# Patient Record
Sex: FEMALE | Race: WHITE | Employment: OTHER | URBAN - METROPOLITAN AREA
[De-identification: names, ages, dates, MRNs, and addresses within clinical notes are randomized per-mention and may not be internally consistent; named-entity substitution may affect disease eponyms.]

---

## 2017-02-17 DIAGNOSIS — I10 HYPERTENSION GOAL BP (BLOOD PRESSURE) < 140/90: ICD-10-CM

## 2017-02-17 NOTE — TELEPHONE ENCOUNTER
Losartan 50 mg      Last Written Prescription Date: 8/12/2016  Last Fill Quantity: 90, # refills: 1  Last Office Visit with [facility] or [facility] prescribing provider: 6/8/2016       Potassium   Date Value Ref Range Status   12/13/2016 3.7 3.4 - 5.3 mmol/L Final     Creatinine   Date Value Ref Range Status   12/13/2016 0.90 0.52 - 1.04 mg/dL Final     BP Readings from Last 3 Encounters:   12/13/16 120/64   06/08/16 124/72   02/22/16 118/62

## 2017-02-20 RX ORDER — LOSARTAN POTASSIUM 50 MG/1
50 TABLET ORAL DAILY
Qty: 90 TABLET | Refills: 2 | Status: SHIPPED | OUTPATIENT
Start: 2017-02-20 | End: 2017-10-11

## 2017-02-20 NOTE — TELEPHONE ENCOUNTER
12/13/16 was last ov.  Prescription approved per [facility] Refill Protocol.  TA Jansen

## 2017-02-23 DIAGNOSIS — E11.9 TYPE 2 DIABETES MELLITUS WITHOUT COMPLICATION (H): Primary | ICD-10-CM

## 2017-02-23 NOTE — TELEPHONE ENCOUNTER
blood glucose monitoring (ACCU-CHEK PATTI PLUS) meter device kit      Last Written Prescription Date: 6/8/16  Last Fill Quantity: 1,  # refills: 0   Last Office Visit with [facility], [facility] or [facility] prescribing provider: 12/13/16

## 2017-02-24 RX ORDER — BLOOD-GLUCOSE METER
EACH MISCELLANEOUS
Qty: 1 KIT | Refills: 0 | Status: SHIPPED | OUTPATIENT
Start: 2017-02-24 | End: 2020-01-01

## 2017-03-11 DIAGNOSIS — I10 HYPERTENSION GOAL BP (BLOOD PRESSURE) < 140/90: ICD-10-CM

## 2017-03-13 RX ORDER — HYDROCHLOROTHIAZIDE 12.5 MG/1
CAPSULE ORAL
Qty: 90 CAPSULE | Refills: 2 | Status: SHIPPED | OUTPATIENT
Start: 2017-03-13 | End: 2017-10-11

## 2017-03-13 NOTE — TELEPHONE ENCOUNTER
hctz  Prescription approved per [facility] Refill Protocol.  TA Jansen    Last Written Prescription Date: 3/7/16  Last Fill Quantity: 90, # refills: 3  Last Office Visit with [facility], [facility] or [facility] prescribing provider: 12/31/16       Potassium   Date Value Ref Range Status   12/13/2016 3.7 3.4 - 5.3 mmol/L Final     Creatinine   Date Value Ref Range Status   12/13/2016 0.90 0.52 - 1.04 mg/dL Final     BP Readings from Last 3 Encounters:   12/13/16 120/64   06/08/16 124/72   02/22/16 118/62

## 2017-08-07 DIAGNOSIS — E78.5 HYPERLIPIDEMIA LDL GOAL <100: ICD-10-CM

## 2017-08-07 DIAGNOSIS — E11.9 TYPE 2 DIABETES MELLITUS WITHOUT COMPLICATION, WITHOUT LONG-TERM CURRENT USE OF INSULIN (H): ICD-10-CM

## 2017-08-07 NOTE — TELEPHONE ENCOUNTER
Medication Detail      Disp Refills Start End KIRAN   lovastatin (MEVACOR) 40 MG tablet 90 tablet 1 12/16/2016  No   Sig: Take 1 tablet (40 mg) by mouth At Bedtime       Last Office Visit with [facility], [facility] or  [facility] prescribing provider: 12/13/16       Lab Results   Component Value Date    CHOL 181 12/13/2016     Lab Results   Component Value Date    HDL 38 12/13/2016     Lab Results   Component Value Date     12/13/2016     Lab Results   Component Value Date    TRIG 192 12/13/2016     Lab Results   Component Value Date    CHOLHDLRATIO 5.4 05/27/2015     Medication Detail      Disp Refills Start End KIRAN   glipiZIDE (GLUCOTROL) 5 MG tablet 45 tablet 1 6/14/2016  No   Sig: Take 0.5 tablets (2.5 mg) by mouth every morning (before breakfast)       Last Office Visit with [facility], [facility] or [facility] prescribing provider:  12/13/16        BP Readings from Last 3 Encounters:   12/13/16 120/64   06/08/16 124/72   02/22/16 118/62     Lab Results   Component Value Date    MICROL 20 12/13/2016     Lab Results   Component Value Date    UMALCR 39.49 12/13/2016     Creatinine   Date Value Ref Range Status   12/13/2016 0.90 0.52 - 1.04 mg/dL Final   ]  GFR Estimate   Date Value Ref Range Status   12/13/2016 59 (L) >60 mL/min/1.7m2 Final     Comment:     Non  GFR Calc   06/08/2016 53 (L) >60 mL/min/1.7m2 Final     Comment:     Non  GFR Calc   01/08/2016 56 (L) >60 mL/min/1.7m2 Final     Comment:     Non  GFR Calc     GFR Estimate If Black   Date Value Ref Range Status   12/13/2016 71 >60 mL/min/1.7m2 Final     Comment:      GFR Calc   06/08/2016 64 >60 mL/min/1.7m2 Final     Comment:      GFR Calc   01/08/2016 68 >60 mL/min/1.7m2 Final     Comment:      GFR Calc     Lab Results   Component Value Date    CHOL 181 12/13/2016     Lab Results   Component Value Date    HDL 38 12/13/2016     Lab Results   Component Value Date     12/13/2016      Lab Results   Component Value Date    TRIG 192 12/13/2016     Lab Results   Component Value Date    CHOLHDLRATIO 5.4 05/27/2015     Lab Results   Component Value Date    AST 14 12/13/2016     Lab Results   Component Value Date    ALT 16 12/13/2016     Lab Results   Component Value Date    A1C 6.0 12/13/2016    A1C 5.9 06/08/2016    A1C 5.9 02/22/2016    A1C 6.3 10/02/2015    A1C 5.6 07/10/2015     Potassium   Date Value Ref Range Status   12/13/2016 3.7 3.4 - 5.3 mmol/L Final       gemfibrozil (LOPID) 600 MG tablet         Last Written Prescription Date:   Last Fill Quantity: , # refills:     Last Office Visit with [facility], [facility] or [facility] prescribing provider:  12/13/16   Future Office Visit:      Cholesterol   Date Value Ref Range Status   12/13/2016 181 <200 mg/dL Final     HDL Cholesterol   Date Value Ref Range Status   12/13/2016 38 (L) >49 mg/dL Final     LDL Cholesterol Calculated   Date Value Ref Range Status   12/13/2016 105 (H) <100 mg/dL Final     Comment:     Above desirable:  100-129 mg/dl   Borderline High:  130-159 mg/dL   High:             160-189 mg/dL   Very high:       >189 mg/dl       Triglycerides   Date Value Ref Range Status   12/13/2016 192 (H) <150 mg/dL Final     Comment:     Borderline high:  150-199 mg/dl   High:             200-499 mg/dl   Very high:       >499 mg/dl   Non Fasting       Cholesterol/HDL Ratio   Date Value Ref Range Status   05/27/2015 5.4 (H) 0.0 - 5.0 Final     ALT   Date Value Ref Range Status   12/13/2016 16 0 - 50 U/L Final

## 2017-08-10 NOTE — TELEPHONE ENCOUNTER
Routing refill request to provider for review/approval because:  Labs out of range:  GFR, LDL  -overdue for 6 month follow up visit    Dr. Tyson-Please sign if agree.  One month supplies pended.    Team coordinators-Please contact patient to schedule diabetes follow up visit.    Thank you!  JAMAR Mireles, DEVN, RN

## 2017-08-11 RX ORDER — GEMFIBROZIL 600 MG/1
600 TABLET, FILM COATED ORAL 2 TIMES DAILY
Qty: 60 TABLET | Refills: 0 | Status: SHIPPED | OUTPATIENT
Start: 2017-08-11 | End: 2017-10-11

## 2017-08-11 RX ORDER — LOVASTATIN 40 MG
40 TABLET ORAL AT BEDTIME
Qty: 30 TABLET | Refills: 0 | Status: SHIPPED | OUTPATIENT
Start: 2017-08-11 | End: 2017-10-11

## 2017-08-11 RX ORDER — GLIPIZIDE 5 MG/1
2.5 TABLET ORAL
Qty: 15 TABLET | Refills: 0 | Status: SHIPPED | OUTPATIENT
Start: 2017-08-11 | End: 2017-10-11

## 2017-10-11 ENCOUNTER — OFFICE VISIT (OUTPATIENT)
Dept: FAMILY MEDICINE | Facility: CLINIC | Age: 82
End: 2017-10-11
Payer: COMMERCIAL

## 2017-10-11 VITALS
WEIGHT: 158.5 LBS | TEMPERATURE: 98.1 F | DIASTOLIC BLOOD PRESSURE: 87 MMHG | OXYGEN SATURATION: 96 % | BODY MASS INDEX: 28.99 KG/M2 | SYSTOLIC BLOOD PRESSURE: 160 MMHG

## 2017-10-11 DIAGNOSIS — Z23 NEED FOR PROPHYLACTIC VACCINATION AGAINST STREPTOCOCCUS PNEUMONIAE (PNEUMOCOCCUS): ICD-10-CM

## 2017-10-11 DIAGNOSIS — I10 HYPERTENSION GOAL BP (BLOOD PRESSURE) < 140/90: ICD-10-CM

## 2017-10-11 DIAGNOSIS — E11.9 TYPE 2 DIABETES MELLITUS WITHOUT COMPLICATION, WITHOUT LONG-TERM CURRENT USE OF INSULIN (H): ICD-10-CM

## 2017-10-11 DIAGNOSIS — Z00.00 ROUTINE GENERAL MEDICAL EXAMINATION AT A HEALTH CARE FACILITY: Primary | ICD-10-CM

## 2017-10-11 DIAGNOSIS — Z23 NEED FOR PROPHYLACTIC VACCINATION AND INOCULATION AGAINST INFLUENZA: ICD-10-CM

## 2017-10-11 DIAGNOSIS — E78.5 HYPERLIPIDEMIA LDL GOAL <100: ICD-10-CM

## 2017-10-11 LAB — HBA1C MFR BLD: 5.7 % (ref 4.3–6)

## 2017-10-11 PROCEDURE — G0008 ADMIN INFLUENZA VIRUS VAC: HCPCS | Performed by: FAMILY MEDICINE

## 2017-10-11 PROCEDURE — 90670 PCV13 VACCINE IM: CPT | Performed by: FAMILY MEDICINE

## 2017-10-11 PROCEDURE — 83036 HEMOGLOBIN GLYCOSYLATED A1C: CPT | Performed by: FAMILY MEDICINE

## 2017-10-11 PROCEDURE — 99397 PER PM REEVAL EST PAT 65+ YR: CPT | Mod: 25 | Performed by: FAMILY MEDICINE

## 2017-10-11 PROCEDURE — G0009 ADMIN PNEUMOCOCCAL VACCINE: HCPCS | Performed by: FAMILY MEDICINE

## 2017-10-11 PROCEDURE — 80053 COMPREHEN METABOLIC PANEL: CPT | Performed by: FAMILY MEDICINE

## 2017-10-11 PROCEDURE — 90662 IIV NO PRSV INCREASED AG IM: CPT | Performed by: FAMILY MEDICINE

## 2017-10-11 PROCEDURE — 82043 UR ALBUMIN QUANTITATIVE: CPT | Performed by: FAMILY MEDICINE

## 2017-10-11 PROCEDURE — 84443 ASSAY THYROID STIM HORMONE: CPT | Performed by: FAMILY MEDICINE

## 2017-10-11 PROCEDURE — 99213 OFFICE O/P EST LOW 20 MIN: CPT | Mod: 25 | Performed by: FAMILY MEDICINE

## 2017-10-11 PROCEDURE — 36415 COLL VENOUS BLD VENIPUNCTURE: CPT | Performed by: FAMILY MEDICINE

## 2017-10-11 RX ORDER — GEMFIBROZIL 600 MG/1
600 TABLET, FILM COATED ORAL 2 TIMES DAILY
Qty: 60 TABLET | Refills: 0 | Status: CANCELLED | OUTPATIENT
Start: 2017-10-11

## 2017-10-11 RX ORDER — GLIPIZIDE 5 MG/1
2.5 TABLET ORAL
Qty: 45 TABLET | Refills: 3 | Status: SHIPPED | OUTPATIENT
Start: 2017-10-11 | End: 2017-10-12

## 2017-10-11 RX ORDER — LOSARTAN POTASSIUM 50 MG/1
50 TABLET ORAL DAILY
Qty: 90 TABLET | Refills: 3 | Status: SHIPPED | OUTPATIENT
Start: 2017-10-11 | End: 2018-10-01

## 2017-10-11 RX ORDER — LOVASTATIN 40 MG
40 TABLET ORAL AT BEDTIME
Qty: 90 TABLET | Refills: 3 | Status: SHIPPED | OUTPATIENT
Start: 2017-10-11 | End: 2018-11-06

## 2017-10-11 RX ORDER — HYDROCHLOROTHIAZIDE 12.5 MG/1
CAPSULE ORAL
Qty: 90 CAPSULE | Refills: 3 | Status: SHIPPED | OUTPATIENT
Start: 2017-10-11 | End: 2019-01-02

## 2017-10-11 NOTE — NURSING NOTE
Screening Questionnaire for Adult Immunization    Are you sick today?   No   Do you have allergies to medications, food, a vaccine component or latex?   No   Have you ever had a serious reaction after receiving a vaccination?   No   Do you have a long-term health problem with heart disease, lung disease, asthma, kidney disease, metabolic disease (e.g. diabetes), anemia, or other blood disorder?   No   Do you have cancer, leukemia, HIV/AIDS, or any other immune system problem?   No   In the past 3 months, have you taken medications that affect  your immune system, such as prednisone, other steroids, or anticancer drugs; drugs for the treatment of rheumatoid arthritis, Crohn s disease, or psoriasis; or have you had radiation treatments?   No   Have you had a seizure, or a brain or other nervous system problem?   No   During the past year, have you received a transfusion of blood or blood     products, or been given immune (gamma) globulin or antiviral drug?   No   For women: Are you pregnant or is there a chance you could become        pregnant during the next month?   No   Have you received any vaccinations in the past 4 weeks?   No     Immunization questionnaire answers were all negative.        Per orders of Dr. chauhan, injection of high dose flu and pcv13 given by Power Marvin. Patient instructed to remain in clinic for 15 minutes afterwards, and to report any adverse reaction to me immediately.  Per orders of Dr. chauhan, injection of high dose flu and pcv13  given by Power Marvin. Patient instructed to remain in clinic for 15 minutes afterwards, and to report any adverse reaction to me immediately.  Prior to injection verified patient identity using patient's name and date of birth.       Screening performed by Power Marvin on 10/11/2017 at 3:44 PM.

## 2017-10-11 NOTE — NURSING NOTE
"Chief Complaint   Patient presents with     Physical     pt is not fasting, had toast        Initial /87  Temp 98.1  F (36.7  C)  Wt 158 lb 8 oz (71.9 kg)  SpO2 96%  BMI 28.99 kg/m2 Estimated body mass index is 28.99 kg/(m^2) as calculated from the following:    Height as of 12/13/16: 5' 2\" (1.575 m).    Weight as of this encounter: 158 lb 8 oz (71.9 kg).  Medication Reconciliation: complete     Kandace Murphy, LEVON        "

## 2017-10-11 NOTE — MR AVS SNAPSHOT
After Visit Summary   10/11/2017    Reanna Garza    MRN: [id number]           Patient Information     Date Of Birth          12/25/1927        Visit Information        Provider Department      10/11/2017 1:40 PM Tristian Tyson MD [facility]        Today's Diagnoses     Type 2 diabetes mellitus without complication, without long-term current use of insulin (H)    -  1    Routine general medical examination at a health care facility        Hyperlipidemia LDL goal <100        Hypertension goal BP (blood pressure) < 140/90        Need for prophylactic vaccination and inoculation against influenza        Need for prophylactic vaccination against Streptococcus pneumoniae (pneumococcus)          Care Instructions    - check bp at home - goal is less than 140/90 - both numbers needs to be below goal.  If above goal - call clinic and we will adjust your medications and see you follow up.     Stop gemfibrozil.     Preventive Health Recommendations    Female Ages 65 +    Yearly exam:     See your health care provider every year in order to  o Review health changes.   o Discuss preventive care.    o Review your medicines if your doctor has prescribed any.      You no longer need a yearly Pap test unless you've had an abnormal Pap test in the past 10 years. If you have vaginal symptoms, such as bleeding or discharge, be sure to talk with your provider about a Pap test.      Every 1 to 2 years, have a mammogram.  If you are over 69, talk with your health care provider about whether or not you want to continue having screening mammograms.      Every 10 years, have a colonoscopy. Or, have a yearly FIT test (stool test). These exams will check for colon cancer.       Have a cholesterol test every 5 years, or more often if your doctor advises it.       Have a diabetes test (fasting glucose) every three years. If you are at risk for diabetes, you should have this test more often.       At  age 65, have a bone density scan (DEXA) to check for osteoporosis (brittle bone disease).    Shots:    Get a flu shot each year.    Get a tetanus shot every 10 years.    Talk to your doctor about your pneumonia vaccines. There are now two you should receive - Pneumovax (PPSV 23) and Prevnar (PCV 13).    Talk to your doctor about the shingles vaccine.    Talk to your doctor about the hepatitis B vaccine.    Nutrition:     Eat at least 5 servings of fruits and vegetables each day.      Eat whole-grain bread, whole-wheat pasta and brown rice instead of white grains and rice.      Talk to your provider about Calcium and Vitamin D.     Lifestyle    Exercise at least 150 minutes a week (30 minutes a day, 5 days a week). This will help you control your weight and prevent disease.      Limit alcohol to one drink per day.      No smoking.       Wear sunscreen to prevent skin cancer.       See your dentist twice a year for an exam and cleaning.      See your eye doctor every 1 to 2 years to screen for conditions such as glaucoma, macular degeneration and cataracts.          Follow-ups after your visit        Who to contact     If you have questions or need follow up information about today's clinic visit or your schedule please contact [facility] directly at [phone].  Normal or non-critical lab and imaging results will be communicated to you by sCoolTVhart, letter or phone within 4 business days after the clinic has received the results. If you do not hear from us within 7 days, please contact the clinic through [employer] or phone. If you have a critical or abnormal lab result, we will notify you by phone as soon as possible.  Submit refill requests through [employer] or call your pharmacy and they will forward the refill request to us. Please allow 3 business days for your refill to be completed.          Additional Information About Your Visit        [employer] Information     [employer] lets you send messages to your  "doctor, view your test results, renew your prescriptions, schedule appointments and more. To sign up, go to [email or web address] . Click on \"Log in\" on the left side of the screen, which will take you to the Welcome page. Then click on \"Sign up Now\" on the right side of the page.     You will be asked to enter the access code listed below, as well as some personal information. Please follow the directions to create your username and password.     Your access code is: [email or web address]  Expires: 2017  3:11 PM     Your access code will  in 90 days. If you need help or a new code, please call your [place] clinic or [phone].        Care EveryWhere ID     This is your Care EveryWhere ID. This could be used by other organizations to access your [facility] medical records  [id number]        Your Vitals Were     Temperature Pulse Oximetry BMI (Body Mass Index)             98.1  F (36.7  C) 96% 28.99 kg/m2          Blood Pressure from Last 3 Encounters:   10/11/17 160/87   16 120/64   16 124/72    Weight from Last 3 Encounters:   10/11/17 158 lb 8 oz (71.9 kg)   16 155 lb 12 oz (70.6 kg)   16 155 lb 8 oz (70.5 kg)              We Performed the Following     Albumin Random Urine Quantitative with Creat Ratio     Comprehensive metabolic panel     FLU VACCINE, INCREASED ANTIGEN, PRESV FREE, AGE 65+ [13506]     Hemoglobin A1c     Pneumococcal vaccine 13 valent PCV13 IM (Prevnar) [25670]     TSH with free T4 reflex     Vaccine Administration, Initial [62544]          Today's Medication Changes          These changes are accurate as of: 10/11/17  2:20 PM.  If you have any questions, ask your nurse or doctor.               These medicines have changed or have updated prescriptions.        Dose/Directions    hydrochlorothiazide 12.5 MG capsule   Commonly known as:  MICROZIDE   This may have changed:  See the new instructions.   Used for:  Hypertension goal BP (blood pressure) < 140/90 "   Changed by:  Tristian Tyson MD        TAKE 1 CAPSULE(12.5 MG) BY MOUTH DAILY   Quantity:  90 capsule   Refills:  3         Stop taking these medicines if you haven't already. Please contact your care team if you have questions.     gemfibrozil 600 MG tablet   Commonly known as:  LOPID   Stopped by:  Tristian Tyson MD                Where to get your medicines      [facility] [id number] [place] [address] AT [facility] [address] [facility] [address]     Phone:  [phone]     glipiZIDE 5 MG tablet    hydrochlorothiazide 12.5 MG capsule    losartan 50 MG tablet    lovastatin 40 MG tablet                Primary Care Provider Office Phone # Fax #    Tristian Tyson [phone] [address] [place] [address]        Equal Access to Services     [facility]: Hadii valencia ku hadasho Soomaali, waaxda luqadaha, qaybta kaalmada adeegyada, waxay idiin haybibin arpita wren . So [facility] [phone].    ATENCIÓN: Si juan cedillo, tiene a mejia disposición servicios gratuitos de asistencia lingüística. Llame al [phone].    We comply with applicable federal civil rights laws and [place] laws. We do not discriminate on the basis of race, color, national origin, age, disability, sex, sexual orientation, or gender identity.            Thank you!     Thank you for choosing [facility]  for your care. Our goal is always to provide you with excellent care. Hearing back from our patients is one way we can continue to improve our services. Please take a few minutes to complete the written survey that you may receive in the mail after your visit with us. Thank you!             Your Updated Medication List - Protect others around you: Learn how to safely use, store and throw away your medicines at www.disposemymeds.org.          This list is accurate as of: 10/11/17   2:20 PM.  Always use your most recent med list.                   Brand Name Dispense Instructions for use Diagnosis    * ACCU-CHEK PATTI Aliya      1 Device daily    Type 2 diabetes, HbA1C goal < 8% (H)       * blood glucose monitoring meter device kit     1 kit    Use to test blood sugars 4 times daily or as directed.    Type 2 diabetes mellitus without complication (H)       acetaminophen 650 MG 8 hour tablet     100 tablet    Take 650 mg by mouth every 4 hours as needed for mild pain or fever    Stroke (H)       ADVIL PO           aspirin 162 MG EC tablet      Take 1 tablet (162 mg) by mouth daily    Stroke (H)       blood glucose monitoring lancets     2 Box    Use to test blood sugar 2 times daily or as directed. Compatible with glucometer.    Type 2 diabetes, HbA1C goal < 8% (H)       * blood glucose monitoring test strip    no brand specified    1 Box    Use to test blood sugar 2 times daily or as directed.    Type 2 diabetes, HbA1C goal < 8% (H)       * blood glucose monitoring test strip    ACCU-CHEK PATTI PLUS    1 Box    Use to test blood sugar 4 times daily or as directed.    Type 2 diabetes, HbA1C goal < 8% (H)       * ACCU-CHEK PATTI PLUS test strip   Generic drug:  blood glucose monitoring           calcium citrate-vitamin D 315-200 MG-UNIT Tabs per tablet    CALCIUM + D    60 tablet    Take 2 tablets by mouth daily    Routine general medical examination at a health care facility       glipiZIDE 5 MG tablet    GLUCOTROL    45 tablet    Take 0.5 tablets (2.5 mg) by mouth every morning (before breakfast)    Type 2 diabetes mellitus without complication, without long-term current use of insulin (H)       hydrochlorothiazide 12.5 MG capsule    MICROZIDE    90 capsule    TAKE 1 CAPSULE(12.5 MG) BY MOUTH DAILY    Hypertension goal BP (blood pressure) < 140/90       hypromellose-dextran 0.3-0.1% opthalmic solution      Apply to eye every 6 hours as needed        losartan 50 MG tablet    COZAAR    90 tablet     Take 1 tablet (50 mg) by mouth daily    Hypertension goal BP (blood pressure) < 140/90       lovastatin 40 MG tablet    MEVACOR    90 tablet    Take 1 tablet (40 mg) by mouth At Bedtime    Hyperlipidemia LDL goal <100       vitamin D 2000 UNITS tablet     100 tablet    Take 4,000 Units by mouth daily    Vitamin D deficiency       * Notice:  This list has 5 medication(s) that are the same as other medications prescribed for you. Read the directions carefully, and ask your doctor or other care provider to review them with you.

## 2017-10-11 NOTE — LETTER
October 16, 2017      Reanna Garza  [address] [place] [id number]        Dear Reanna,    Your A1c is 5.7 which is good. It is going towards lower side and we may consider stopping your diabetes medications if your blood sugar continues to run low.  RIGHT NOW I SUGGEST YOU TAKE YOUR GLIPIZIDE 1/2 PILL EVERY OTHER DAY INSTEAD OF DAILY.   Your urine shows some leaking of protein which is common with diabetes.  Your thyroid is normal.   Kidney and liver functions are fine.  As your blood sugars are running on lower side, we want to see you back in 3 months.  Call me with questions.     Results for orders placed or performed in visit on 10/11/17   Albumin Random Urine Quantitative with Creat Ratio   Result Value Ref Range    Creatinine Urine 54 mg/dL    Albumin Urine mg/L 24 mg/L    Albumin Urine mg/g Cr 44.80 (H) 0 - 25 mg/g Cr   TSH with free T4 reflex   Result Value Ref Range    TSH 2.29 0.40 - 4.00 mU/L   Hemoglobin A1c   Result Value Ref Range    Hemoglobin A1C 5.7 4.3 - 6.0 %   Comprehensive metabolic panel   Result Value Ref Range    Sodium 140 133 - 144 mmol/L    Potassium 3.5 3.4 - 5.3 mmol/L    Chloride 105 94 - 109 mmol/L    Carbon Dioxide 27 20 - 32 mmol/L    Anion Gap 8 3 - 14 mmol/L    Glucose 66 (L) 70 - 99 mg/dL    Urea Nitrogen 25 7 - 30 mg/dL    Creatinine 0.79 0.52 - 1.04 mg/dL    GFR Estimate 69 >60 mL/min/1.7m2    GFR Estimate If Black 83 >60 mL/min/1.7m2    Calcium 9.1 8.5 - 10.1 mg/dL    Bilirubin Total 0.4 0.2 - 1.3 mg/dL    Albumin 4.2 3.4 - 5.0 g/dL    Protein Total 7.7 6.8 - 8.8 g/dL    Alkaline Phosphatase 49 40 - 150 U/L    ALT 18 0 - 50 U/L    AST 23 0 - 45 U/L               Sincerely,        Tristian Tyson MD/bee

## 2017-10-11 NOTE — PATIENT INSTRUCTIONS
- check bp at home - goal is less than 140/90 - both numbers needs to be below goal.  If above goal - call clinic and we will adjust your medications and see you follow up.     Stop gemfibrozil.     Preventive Health Recommendations    Female Ages 65 +    Yearly exam:     See your health care provider every year in order to  o Review health changes.   o Discuss preventive care.    o Review your medicines if your doctor has prescribed any.      You no longer need a yearly Pap test unless you've had an abnormal Pap test in the past 10 years. If you have vaginal symptoms, such as bleeding or discharge, be sure to talk with your provider about a Pap test.      Every 1 to 2 years, have a mammogram.  If you are over 69, talk with your health care provider about whether or not you want to continue having screening mammograms.      Every 10 years, have a colonoscopy. Or, have a yearly FIT test (stool test). These exams will check for colon cancer.       Have a cholesterol test every 5 years, or more often if your doctor advises it.       Have a diabetes test (fasting glucose) every three years. If you are at risk for diabetes, you should have this test more often.       At age 65, have a bone density scan (DEXA) to check for osteoporosis (brittle bone disease).    Shots:    Get a flu shot each year.    Get a tetanus shot every 10 years.    Talk to your doctor about your pneumonia vaccines. There are now two you should receive - Pneumovax (PPSV 23) and Prevnar (PCV 13).    Talk to your doctor about the shingles vaccine.    Talk to your doctor about the hepatitis B vaccine.    Nutrition:     Eat at least 5 servings of fruits and vegetables each day.      Eat whole-grain bread, whole-wheat pasta and brown rice instead of white grains and rice.      Talk to your provider about Calcium and Vitamin D.     Lifestyle    Exercise at least 150 minutes a week (30 minutes a day, 5 days a week). This will help you control your weight  and prevent disease.      Limit alcohol to one drink per day.      No smoking.       Wear sunscreen to prevent skin cancer.       See your dentist twice a year for an exam and cleaning.      See your eye doctor every 1 to 2 years to screen for conditions such as glaucoma, macular degeneration and cataracts.

## 2017-10-11 NOTE — PROGRESS NOTES
".        SUBJECTIVE:   Reanna Garza is a 89 year old female who presents for Preventive Visit.      Are you in the first 12 months of your Medicare Part B coverage?  No    Healthy Habits:    Do you get at least three servings of calcium containing foods daily (dairy, green leafy vegetables, etc.)? {YES/NO, DAIRY INTAKE:004315::\"yes\"}    Amount of exercise or daily activities, outside of work: {AMOUNT EXERCISE:765718}    Problems taking medications regularly {Yes /No default:816768::\"No\"}    Medication side effects: {Yes /No default.:673348::\"No\"}    Have you had an eye exam in the past two years? {YESNOBLANK:303353}    Do you see a dentist twice per year? {YESNOBLANK:708344}    Do you have sleep apnea, excessive snoring or daytime drowsiness?{YESNOBLANK:698312}    {AWV Cognitive Screenin}    {Outside tests to abstract? :428774}    {additional problems to add (Optional):034825}    Reviewed and updated as needed this visit by clinical staff         Reviewed and updated as needed this visit by Provider        Social History   Substance Use Topics     Smoking status: Former Smoker     Quit date: 1999     Smokeless tobacco: Never Used     Alcohol use Yes      Comment: rare       {ETOH AUDIT:894198}    Today's PHQ-2 Score:   PHQ-2 (  [employer]) 10/11/2017 2016   Q1: Little interest or pleasure in doing things 0 0   Q2: Feeling down, depressed or hopeless 0 0   PHQ-2 Score 0 0   Q1: Little interest or pleasure in doing things Not at all -   Q2: Feeling down, depressed or hopeless Not at all -   PHQ-2 Score 0 -     {PHQ-2 LOOK IN ASSESSMENTS (Optional) :136187}  Do you feel safe in your environment - {YES/NO/NA:238559}    Do you have a Health Care Directive?: {HEALTHCARE DIRECTIVE STATUS:106546}    Current providers sharing in care for this patient include:   Patient Care Team:  Tristian Tyson MD as PCP - General (Family Practice)      Hearing impairment: {NO/YES:147652}    Ability to " "successfully perform activities of daily living: {YES/NO (MEDICARE):146244::\"Yes, no assistance needed\"}     Fall risk:  {Document Fall Risk in the Assessments Section of the Navigator:714519}    Home safety:  {IPPE SAFETY CONCERNS:101007::\"none identified\"}  {If any of the above assessments are answered yes, consider ordering appropriate referrals (Optional):647033::\"click delete button to remove this line now\"}    The following health maintenance items are reviewed in Epic and correct as of today:  Health Maintenance   Topic Date Due     PNEUMOCOCCAL (2 of 2 - PCV13) 2009     FOOT EXAM Q1 YEAR  2016     EYE EXAM Q1 YEAR  2016     TSH W/ FREE T4 REFLEX Q2 YEAR  2017     CBC Q1 YR  2017     PHQ-9 Q1YR  2017     CMP Q6 MOS  2017     A1C Q6 MO  2017     LIPID MONITORING Q6 MO  2017     INFLUENZA VACCINE (SYSTEM ASSIGNED)  2017     FALL RISK ASSESSMENT  2017     MICROALBUMIN Q1 YEAR  2017     MELODIE QUESTIONNAIRE 1 YEAR  2017     ADVANCE DIRECTIVE PLANNING Q5 YRS  2020     TETANUS IMMUNIZATION (SYSTEM ASSIGNED)  07/10/2024     {Chronicprobdata (Optional):660880}    {Decision Support (Optional):354694}    ROS:  {ROS COMP:075200}    OBJECTIVE:   There were no vitals taken for this visit. Estimated body mass index is 28.49 kg/(m^2) as calculated from the following:    Height as of 16: 5' 2\" (1.575 m).    Weight as of 16: 155 lb 12 oz (70.6 kg).  EXAM:   {Exam :994471}    ASSESSMENT / PLAN:   {Diag Picklist:002301}    End of Life Planning:  Patient currently has an advanced directive: { :212759}    COUNSELING:  {Medicare Counselin}    {BP Counseling- Complete if BP >= 120/80  (Optional):716382}    Estimated body mass index is 28.49 kg/(m^2) as calculated from the following:    Height as of 16: 5' 2\" (1.575 m).    Weight as of 16: 155 lb 12 oz (70.6 kg).  {Weight Management Plan -- Complete if patient has an " abnormal BMI (Optional):284882}   reports that she quit smoking about 17 years ago. She has never used smokeless tobacco.  {Tobacco Cessation -- Complete if patient is a smoker (Optional):278440}    Appropriate preventive services were discussed with this patient, including applicable screening as appropriate for cardiovascular disease, diabetes, osteopenia/osteoporosis, and glaucoma.  As appropriate for age/gender, discussed screening for colorectal cancer, prostate cancer, breast cancer, and cervical cancer. Checklist reviewing preventive services available has been given to the patient.    Reviewed patients plan of care and provided an AVS. The {CarePlan:983218} for Reanna meets the Care Plan requirement. This Care Plan has been established and reviewed with the {PATIENT, FAMILY MEMBER, CAREGIVER:744757}.    Counseling Resources:  ATP IV Guidelines  Pooled Cohorts Equation Calculator  Breast Cancer Risk Calculator  FRAX Risk Assessment  ICSI Preventive Guidelines  Dietary Guidelines for Americans, 2010  USDA's MyPlate  ASA Prophylaxis  Lung CA Screening    Tristian Tyson MD, MD  [facility]

## 2017-10-12 LAB
ALBUMIN SERPL-MCNC: 4.2 G/DL (ref 3.4–5)
ALP SERPL-CCNC: 49 U/L (ref 40–150)
ALT SERPL W P-5'-P-CCNC: 18 U/L (ref 0–50)
ANION GAP SERPL CALCULATED.3IONS-SCNC: 8 MMOL/L (ref 3–14)
AST SERPL W P-5'-P-CCNC: 23 U/L (ref 0–45)
BILIRUB SERPL-MCNC: 0.4 MG/DL (ref 0.2–1.3)
BUN SERPL-MCNC: 25 MG/DL (ref 7–30)
CALCIUM SERPL-MCNC: 9.1 MG/DL (ref 8.5–10.1)
CHLORIDE SERPL-SCNC: 105 MMOL/L (ref 94–109)
CO2 SERPL-SCNC: 27 MMOL/L (ref 20–32)
CREAT SERPL-MCNC: 0.79 MG/DL (ref 0.52–1.04)
CREAT UR-MCNC: 54 MG/DL
GFR SERPL CREATININE-BSD FRML MDRD: 69 ML/MIN/1.7M2
GLUCOSE SERPL-MCNC: 66 MG/DL (ref 70–99)
MICROALBUMIN UR-MCNC: 24 MG/L
MICROALBUMIN/CREAT UR: 44.8 MG/G CR (ref 0–25)
POTASSIUM SERPL-SCNC: 3.5 MMOL/L (ref 3.4–5.3)
PROT SERPL-MCNC: 7.7 G/DL (ref 6.8–8.8)
SODIUM SERPL-SCNC: 140 MMOL/L (ref 133–144)
TSH SERPL DL<=0.005 MIU/L-ACNC: 2.29 MU/L (ref 0.4–4)

## 2017-10-12 RX ORDER — GLIPIZIDE 5 MG/1
TABLET ORAL
Qty: 23 TABLET | Refills: 3 | Status: SHIPPED | OUTPATIENT
Start: 2017-10-12 | End: 2018-01-09

## 2017-11-17 DIAGNOSIS — E78.5 HYPERLIPIDEMIA LDL GOAL <100: ICD-10-CM

## 2017-11-17 NOTE — TELEPHONE ENCOUNTER
lov 10/11/17    Medication Detail      Disp Refills Start End KIRAN   gemfibrozil (LOPID) 600 MG tablet (Discontinued) 60 tablet 0 8/11/2017 10/11/2017 Citlalli neville [facility]

## 2017-11-21 NOTE — TELEPHONE ENCOUNTER
Per 10/11/17 office visit note, Gemfibrozil discontinued and patient started on Lovastatin.    -Please contact patient to ask if she requested Gemfibrozil refill.  Is she still taking Gemfibrozil or did she switch over to Lovastatin?    Thank you!  DEV VilledaN, RN

## 2017-12-05 RX ORDER — GEMFIBROZIL 600 MG/1
TABLET, FILM COATED ORAL
Qty: 180 TABLET | Refills: 0 | OUTPATIENT
Start: 2017-12-05

## 2017-12-05 NOTE — TELEPHONE ENCOUNTER
Reviewed the last ov note with pt.  She wrote down the name-lovastatin. She will only take that med.  She will call her pharmacy about any needed refill on that.    PT felt her daughter knew about this med change.  TA Jansen

## 2017-12-05 NOTE — TELEPHONE ENCOUNTER
The writer spoke to the patient for medication clarification as noted below.  The patient seems to be a bit confused as to what she is to be taking.  RN, can you reach out to the patient to clarify and ensure that the patient is taking medications as she should.

## 2018-01-03 DIAGNOSIS — I10 HYPERTENSION GOAL BP (BLOOD PRESSURE) < 140/90: ICD-10-CM

## 2018-01-03 NOTE — TELEPHONE ENCOUNTER
Requested Prescriptions   Pending Prescriptions Disp Refills     hydrochlorothiazide (MICROZIDE) 12.5 MG capsule [Pharmacy Med Name: HYDROCHLOROTHIAZIDE 12.5MG CAPSULES]  Last Written Prescription Date:  10/11/2017  Last Fill Quantity: 90 capsule,  # refills: 3   Last Office Visit with [facility], [facility] or [facility] prescribing provider:  10/11/2017   Future Office Visit:    90 capsule 0     Sig: TAKE 1 CAPSULE(12.5 MG) BY MOUTH DAILY    Diuretics (Including Combos) Protocol Failed    1/3/2018  3:57 AM       Failed - Blood pressure under 140/90    BP Readings from Last 3 Encounters:   10/11/17 160/87   12/13/16 120/64   06/08/16 124/72          Passed - Recent or future visit with authorizing provider's specialty    Patient had office visit in the last year or has a visit in the next 30 days with authorizing provider.  See chart review.          Passed - Patient is age 18 or older       Passed - No active pregancy on record       Passed - Normal serum creatinine on file in past 12 months    Recent Labs   Lab Test  10/11/17   1410   CR  0.79           Passed - Normal serum potassium on file in past 12 months    Recent Labs   Lab Test  10/11/17   1410   POTASSIUM  3.5           Passed - Normal serum sodium on file in past 12 months    Recent Labs   Lab Test  10/11/17   1410   NA  140           Passed - No positive pregnancy test in past 12 months

## 2018-01-05 RX ORDER — HYDROCHLOROTHIAZIDE 12.5 MG/1
CAPSULE ORAL
Qty: 90 CAPSULE | Refills: 0 | OUTPATIENT
Start: 2018-01-05

## 2018-01-08 ENCOUNTER — TELEPHONE (OUTPATIENT)
Dept: FAMILY MEDICINE | Facility: CLINIC | Age: 83
End: 2018-01-08

## 2018-01-08 DIAGNOSIS — E11.9 TYPE 2 DIABETES, HBA1C GOAL < 8% (H): ICD-10-CM

## 2018-01-08 NOTE — TELEPHONE ENCOUNTER
"Reason for Call:  Other-Concern Regarding Pt Behavior    Detailed comments: Pt's daughter called regarding mother's Glipizide medication dose. Pt receive a letter on 10/11/2017 stating \"RIGHT NOW I SUGGEST YOU TAKE YOUR GLIPIZIDE 1/2 PILL EVERY OTHER DAY INSTEAD OF DAILY.\" Pt;s daughter states that her mother has been very forgetful, angry, and speech is slurring. Please contact Marcella regarding this. Thanks!    Phone Number Patient can be reached at: [phone]    Best Time: Anytime    Can we leave a detailed message on this number? YES    Call taken on 1/8/2018 at 10:15 AM by Amee Blackwell      "

## 2018-01-08 NOTE — TELEPHONE ENCOUNTER
Marcella calling back said she check her mother's blood sugars and she said's they are fine and does not think taking her to the ER is neccessary patient has appt this Friday but she is asking to get a prescription filled for test strips and was told by pharmacy that she would have to ask for a new prescription for this

## 2018-01-08 NOTE — TELEPHONE ENCOUNTER
Writer called Marcella who stated:  1. Patient's blood sugar was 120  2. Currently with patient and she seems fine  3. Follow up appt with Dr. Tyson on 1/12/17.  4. Can Accu-Chek Zahraa Plus test strips be refilled?   A. Would like to  today from patient's pharmacy    Writer recommended:  1. Keep follow up appt  2. If patient has any symptoms that seem unusual for her or of are any concern, call clinic  3. Writer will be able to send in refill for test strips but writer cannot say when pharmacy will have them ready for     Marcella verbalized understanding and in agreement with plan.    JAMAR Carlson, DEVN, RN

## 2018-01-08 NOTE — TELEPHONE ENCOUNTER
"Writer notes stroke on patient's problem list.    There is no consent to communicate with patient's daughter on file.    Writer is concerned about possible stroke symptoms.    Writer called Marcella who stated:  1. She (Marcella) knows when patient's blood sugars are high and patient not taking her medication  2. Does not think patient is getting enough of all her meds  3. Told one sister to tell the other sister, Edith, who sets up meds to make sure patient takes Glipizide DAILY, instead of every other day, which is what letter from Dr. Tyson instructed  4. Dr. Tyson says patient does not need to check blood sugars and can \"eat cake\"    Writer explained to Marcella why an accurate blood sugar reading today is important: if there is concern for high blood sugar, need to evaluate for possible diabetic ketoacidosis (DKA).  Explained serious complications of DKA.    Marcella insisted DKA is not a result of high blood sugar and writer again explained DKA.      Reviewed if patient having slurred speech and it is new, she needs to be evaluated in ER.    Marcella stated she would call back with blood sugar reading-is taking patient to an eye doctor appt today.  Patient has two meters at home and one of them should work.    Marcella then stated the slurred speech is \"minute\" and is more of a hesitation in patient's speech while speaking that is noticeable by her, only.    Awaiting call back from Marcella.    JAMAR Carlson, BSN, RN        "

## 2018-01-09 ENCOUNTER — OFFICE VISIT (OUTPATIENT)
Dept: FAMILY MEDICINE | Facility: CLINIC | Age: 83
End: 2018-01-09
Payer: COMMERCIAL

## 2018-01-09 VITALS
HEIGHT: 61 IN | TEMPERATURE: 98.1 F | BODY MASS INDEX: 29.92 KG/M2 | OXYGEN SATURATION: 97 % | RESPIRATION RATE: 18 BRPM | WEIGHT: 158.5 LBS | SYSTOLIC BLOOD PRESSURE: 130 MMHG | DIASTOLIC BLOOD PRESSURE: 73 MMHG | HEART RATE: 68 BPM

## 2018-01-09 DIAGNOSIS — E11.9 TYPE 2 DIABETES MELLITUS WITHOUT COMPLICATION, WITHOUT LONG-TERM CURRENT USE OF INSULIN (H): Primary | ICD-10-CM

## 2018-01-09 LAB
ERYTHROCYTE [DISTWIDTH] IN BLOOD BY AUTOMATED COUNT: 13.7 % (ref 10–15)
HBA1C MFR BLD: 6.3 % (ref 4.3–6)
HCT VFR BLD AUTO: 37.2 % (ref 35–47)
HGB BLD-MCNC: 12.5 G/DL (ref 11.7–15.7)
MCH RBC QN AUTO: 29.1 PG (ref 26.5–33)
MCHC RBC AUTO-ENTMCNC: 33.6 G/DL (ref 31.5–36.5)
MCV RBC AUTO: 87 FL (ref 78–100)
PLATELET # BLD AUTO: 203 10E9/L (ref 150–450)
RBC # BLD AUTO: 4.3 10E12/L (ref 3.8–5.2)
WBC # BLD AUTO: 6.9 10E9/L (ref 4–11)

## 2018-01-09 PROCEDURE — 99214 OFFICE O/P EST MOD 30 MIN: CPT | Performed by: FAMILY MEDICINE

## 2018-01-09 PROCEDURE — 80053 COMPREHEN METABOLIC PANEL: CPT | Performed by: FAMILY MEDICINE

## 2018-01-09 PROCEDURE — 80061 LIPID PANEL: CPT | Performed by: FAMILY MEDICINE

## 2018-01-09 PROCEDURE — 83036 HEMOGLOBIN GLYCOSYLATED A1C: CPT | Performed by: FAMILY MEDICINE

## 2018-01-09 PROCEDURE — 36415 COLL VENOUS BLD VENIPUNCTURE: CPT | Performed by: FAMILY MEDICINE

## 2018-01-09 PROCEDURE — 85027 COMPLETE CBC AUTOMATED: CPT | Performed by: FAMILY MEDICINE

## 2018-01-09 RX ORDER — GLIPIZIDE 5 MG/1
TABLET ORAL
Qty: 45 TABLET | Refills: 0 | Status: SHIPPED | OUTPATIENT
Start: 2018-01-09 | End: 2018-01-09

## 2018-01-09 RX ORDER — GLIPIZIDE 5 MG/1
TABLET ORAL
Qty: 45 TABLET | Refills: 0 | Status: SHIPPED | OUTPATIENT
Start: 2018-01-09 | End: 2018-07-06

## 2018-01-09 NOTE — PROGRESS NOTES
SUBJECTIVE:   Reanna Garza is a 89 year old female who presents to clinic today for the following health issues:      Diabetes Follow-up      Patient is checking blood sugars: not at all    Diabetic concerns: None     Symptoms of hypoglycemia (low blood sugar): none daughter- state that she tend to slurr or confuse when bs is high     Paresthesias (numbness or burning in feet) or sores: No     Date of last diabetic eye exam: 1/08/2018    BP Readings from Last 2 Encounters:   10/11/17 160/87   12/13/16 120/64     Hemoglobin A1C (%)   Date Value   01/09/2018 6.3 (H)   10/11/2017 5.7     LDL Cholesterol Calculated (mg/dL)   Date Value   12/13/2016 105 (H)   06/08/2016 97       Amount of exercise or physical activity: 6-7 days/week for an average of daily activities     Problems taking medications regularly: No    Medication side effects: none    Diet: regular (no restrictions)    Here with her daughter Marcella. She usually comes in along with patient for her appointment.  Patient lives alone and 1 of her daughter lives with her (frandy) but she does not manage her medication.  Another daughter (Laura manages her medication)   Yesterday slurry words - per daughter when her blood sugars are high she has slurry words.     Per Marcella -patient has had a stroke and they do not want to go through the same route again.  They were clearly told by a neurologist not to raise her A1c over 6. Marcella is really concerned that we cut down her glipizide to every other day and now her blood sugars are going up high.  She can just tell when her mother has high blood sugar as her mentation gets worse and she has a slurry speech.   Patient does not usually check her blood sugar but upon request she can check it.    Problem list and histories reviewed & adjusted, as indicated.  Additional history: as documented    Labs reviewed in EPIC    Reviewed and updated as needed this visit by clinical staff     Reviewed and updated as needed this  visit by Provider           Social History     Social History     Marital status:      Spouse name: N/A     Number of children: N/A     Years of education: N/A     Social History Main Topics     Smoking status: Former Smoker     Quit date: 11/7/1999     Smokeless tobacco: Never Used     Alcohol use Yes      Comment: rare     Drug use: No     Sexual activity: Yes     Partners: Male     Other Topics Concern     Parent/Sibling W/ Cabg, Mi Or Angioplasty Before 65f 55m? No     Social History Narrative    Cancer Warning Signs:  Patient states she has experienced none    Balanced Diet - Yes    Osteoporosis Prevention Measures - Dairy servings per day: 1-2    Regular Exercise -  No Describe     Dental Exam - YES - Date: 2009    Eye Exam - YES - Date: 2008    Self Breast Exam - Yes    Abuse: Current or Past (Physical, Sexual or Emotional)- No    Do you feel safe in your environment - Yes    Guns stored in the home - No    Sunscreen used - Yes    Seatbelts used - Yes    Lipids -  YES - Date: 6/2008    Glucose -  YES - Date: 6/2008    Colon Cancer Screening - No    Hemoccults - NO    Pap Test -  NO    Do you have any concerns about STD's -  No    Mammography - YES - Date: 7/2008    DEXA - YES - Date: 7/2008    Immunizations reviewed and up to date - Yes Last Td was 11/29/02    Updated 6/2001             Allergies   Allergen Reactions     Codeine      Hives       Codeine Sulfate Hives     Penicillins Hives     [place] Hives     Patient Active Problem List   Diagnosis     Abdominal aortic aneurysm (H)     Restless legs syndrome (RLS)     Abnormality of gait     Imbalance     HYPERLIPIDEMIA LDL GOAL <100     Low back pain     Lumbar spondylosis     Right hip pain     Trochanteric bursitis     Hypertension goal BP (blood pressure) < 140/90     Shoulder pain     Disturbance of skin sensation     Macular degeneration (senile) of retina     Chronic rhinitis     Stroke (H)     Vitamin D deficiency     Advanced care  "planning/counseling discussion     Type 2 diabetes mellitus without complication (H)     Reviewed medications, social history and  past medical and surgical history.    Review of system: for general, respiratory, CVS, GI and psychiatry negative except for noted above.     EXAM:  /73 (BP Location: Right arm, Patient Position: Chair, Cuff Size: Adult Regular)  Pulse 68  Temp 98.1  F (36.7  C) (Oral)  Resp 18  Ht 5' 1\" (1.549 m)  Wt 158 lb 8 oz (71.9 kg)  SpO2 97%  BMI 29.95 kg/m2  Constitutional: , alert and no distress   Psychiatric: Mildly confused but present.  Mostly quiet for entire visit.    ASSESSMENT / PLAN:  (E11.9) Type 2 diabetes mellitus without complication, without long-term current use of insulin (H)  (primary encounter diagnosis)  Comment: Today I again explained in detail about not controlling her blood sugar very strictly as it is going to harm her more than going to help her you for her age.  Daughter is not quite convinced and she would like her A1c to be around 6 possible.  I pointed out that she has had a hypoglycemia episode which daughter disregards and states that it was from medication error.   -Patient is not having major hypoglycemia symptom and I think low-dose of glipizide (2.5mg) may still be fine to continue every day as per daughter's request instead of every other day. But I again again explained to them that keeping her goal A1c between 7-8 is ideal and keeping it very strictly control is not recommended.  I also explained to them that it may be beyond my comfort to keep her A1c really low and if her A1c is still going down and if they choose not to change the dose they should seek an opinion from another specialist.  Plan: Lipid panel reflex to direct LDL Fasting,         Hemoglobin A1c, Comprehensive metabolic panel,         CBC with platelets, glipiZIDE (GLUCOTROL) 5 MG         tablet, DISCONTINUED: glipiZIDE (GLUCOTROL) 5         MG tablet           Follow-up in 3 " months    I spent > 25 minutes and more than 1/2 of the time was in counselling and coordination of care regarding above mentioned issues.

## 2018-01-09 NOTE — MR AVS SNAPSHOT
"              After Visit Summary   2018    Reanna Garza    MRN: [id number]           Patient Information     Date Of Birth          1927        Visit Information        Provider Department      2018 2:40 PM Tristian Tyson MD [facility]        Today's Diagnoses     Type 2 diabetes mellitus without complication, without long-term current use of insulin (H)    -  1       Follow-ups after your visit        Who to contact     If you have questions or need follow up information about today's clinic visit or your schedule please contact [facility] directly at [phone].  Normal or non-critical lab and imaging results will be communicated to you by MyChart, letter or phone within 4 business days after the clinic has received the results. If you do not hear from us within 7 days, please contact the clinic through MyChart or phone. If you have a critical or abnormal lab result, we will notify you by phone as soon as possible.  Submit refill requests through Dexcom or call your pharmacy and they will forward the refill request to us. Please allow 3 business days for your refill to be completed.          Additional Information About Your Visit        MyChart Information     Dexcom lets you send messages to your doctor, view your test results, renew your prescriptions, schedule appointments and more. To sign up, go to [email or web address] . Click on \"Log in\" on the left side of the screen, which will take you to the Welcome page. Then click on \"Sign up Now\" on the right side of the page.     You will be asked to enter the access code listed below, as well as some personal information. Please follow the directions to create your username and password.     Your access code is: [email or web address]  Expires: 2018  1:56 PM     Your access code will  in 90 days. If you need help or a new code, please call your [facility] or [phone].        Care " "EveryWhere ID     This is your Care EveryWhere ID. This could be used by other organizations to access your [facility] medical records  [id number]        Your Vitals Were     Pulse Temperature Respirations Height Pulse Oximetry BMI (Body Mass Index)    68 98.1  F (36.7  C) (Oral) 18 5' 1\" (1.549 m) 97% 29.95 kg/m2       Blood Pressure from Last 3 Encounters:   01/09/18 130/73   10/11/17 160/87   12/13/16 120/64    Weight from Last 3 Encounters:   01/09/18 158 lb 8 oz (71.9 kg)   10/11/17 158 lb 8 oz (71.9 kg)   12/13/16 155 lb 12 oz (70.6 kg)              We Performed the Following     CBC with platelets     Comprehensive metabolic panel     Hemoglobin A1c     Lipid panel reflex to direct LDL Fasting          Today's Medication Changes          These changes are accurate as of: 1/9/18 11:59 PM.  If you have any questions, ask your nurse or doctor.               Start taking these medicines.        Dose/Directions    glipiZIDE 5 MG tablet   Commonly known as:  GLUCOTROL   Used for:  Type 2 diabetes mellitus without complication, without long-term current use of insulin (H)   Started by:  Tristian Tyson MD        Take 1/2 pill every day.   Quantity:  45 tablet   Refills:  0            Where to get your medicines      These medications were sent to [facility] [id number] [place] [address] [address] AT [facility] [address]  [address] [facility] [address]     Phone:  [phone]     glipiZIDE 5 MG tablet                Primary Care Provider Office Phone # Fax #    Tristian Tyson [phone] [address] [place] [address]        Equal Access to Services     DELORES BROOKS AH: Audrey Lenz, danita huston, kayla kaalmada kellee, tresa [facility] [phone].    ATENCIÓN: Si habla español, tiene a mejia disposición servicios gratuitos de asistencia lingüística. Llame al [phone].    We " comply with applicable federal civil rights laws and [place] laws. We do not discriminate on the basis of race, color, national origin, age, disability, sex, sexual orientation, or gender identity.            Thank you!     Thank you for choosing [facility]  for your care. Our goal is always to provide you with excellent care. Hearing back from our patients is one way we can continue to improve our services. Please take a few minutes to complete the written survey that you may receive in the mail after your visit with us. Thank you!             Your Updated Medication List - Protect others around you: Learn how to safely use, store and throw away your medicines at www.disposemymeds.org.          This list is accurate as of: 1/9/18 11:59 PM.  Always use your most recent med list.                   Brand Name Dispense Instructions for use Diagnosis    * ACCU-CHEK PATTI Aliya      1 Device daily    Type 2 diabetes, HbA1C goal < 8% (H)       * blood glucose monitoring meter device kit     1 kit    Use to test blood sugars 4 times daily or as directed.    Type 2 diabetes mellitus without complication (H)       acetaminophen 650 MG 8 hour tablet     100 tablet    Take 650 mg by mouth every 4 hours as needed for mild pain or fever    Stroke (H)       ADVIL PO           aspirin 162 MG EC tablet      Take 1 tablet (162 mg) by mouth daily    Stroke (H)       blood glucose monitoring lancets     2 Box    Use to test blood sugar 2 times daily or as directed. Compatible with glucometer.    Type 2 diabetes, HbA1C goal < 8% (H)       * blood glucose monitoring test strip    no brand specified    1 Box    Use to test blood sugar 2 times daily or as directed.    Type 2 diabetes, HbA1C goal < 8% (H)       * ACCU-CHEK PATTI PLUS test strip   Generic drug:  blood glucose monitoring           * blood glucose monitoring test strip    ACCU-CHEK PATTI PLUS    1 Box    Use to test blood sugar 4 times daily or as directed.     Type 2 diabetes, HbA1C goal < 8% (H)       calcium citrate-vitamin D 315-200 MG-UNIT Tabs per tablet    CALCIUM + D    60 tablet    Take 2 tablets by mouth daily    Routine general medical examination at a health care facility       glipiZIDE 5 MG tablet    GLUCOTROL    45 tablet    Take 1/2 pill every day.    Type 2 diabetes mellitus without complication, without long-term current use of insulin (H)       hydrochlorothiazide 12.5 MG capsule    MICROZIDE    90 capsule    TAKE 1 CAPSULE(12.5 MG) BY MOUTH DAILY    Hypertension goal BP (blood pressure) < 140/90       hypromellose-dextran 0.3-0.1% opthalmic solution      Apply to eye every 6 hours as needed        losartan 50 MG tablet    COZAAR    90 tablet    Take 1 tablet (50 mg) by mouth daily    Hypertension goal BP (blood pressure) < 140/90       lovastatin 40 MG tablet    MEVACOR    90 tablet    Take 1 tablet (40 mg) by mouth At Bedtime    Hyperlipidemia LDL goal <100       vitamin D 2000 UNITS tablet     100 tablet    Take 4,000 Units by mouth daily    Vitamin D deficiency       * Notice:  This list has 5 medication(s) that are the same as other medications prescribed for you. Read the directions carefully, and ask your doctor or other care provider to review them with you.

## 2018-01-09 NOTE — LETTER
January 12, 2018      Reanna ROACH Greg  [address] [place] [id number]        Dear ,    We are writing to inform you of your test results.    Your good cholesterol is low which improves with exercise.  Your kidney and liver function tests are fine.  Your hemoglobin and white counts are fine.    Resulted Orders   Lipid panel reflex to direct LDL Fasting   Result Value Ref Range    Cholesterol 176 <200 mg/dL    Triglycerides 284 (H) <150 mg/dL      Comment:      Borderline high:  150-199 mg/dl  High:             200-499 mg/dl  Very high:       >499 mg/dl  Non Fasting      HDL Cholesterol 32 (L) >49 mg/dL    LDL Cholesterol Calculated 87 <100 mg/dL      Comment:      Desirable:       <100 mg/dl    Non HDL Cholesterol 144 (H) <130 mg/dL      Comment:      Above Desirable:  130-159 mg/dl  Borderline high:  160-189 mg/dl  High:             190-219 mg/dl  Very high:       >219 mg/dl     Hemoglobin A1c   Result Value Ref Range    Hemoglobin A1C 6.3 (H) 4.3 - 6.0 %   Comprehensive metabolic panel   Result Value Ref Range    Sodium 138 133 - 144 mmol/L    Potassium 3.4 3.4 - 5.3 mmol/L    Chloride 102 94 - 109 mmol/L    Carbon Dioxide 27 20 - 32 mmol/L    Anion Gap 9 3 - 14 mmol/L    Glucose 86 70 - 99 mg/dL      Comment:      Non Fasting    Urea Nitrogen 20 7 - 30 mg/dL    Creatinine 0.70 0.52 - 1.04 mg/dL    GFR Estimate 78 >60 mL/min/1.7m2      Comment:      Non  GFR Calc    GFR Estimate If Black >90 >60 mL/min/1.7m2      Comment:       GFR Calc    Calcium 8.9 8.5 - 10.1 mg/dL    Bilirubin Total 0.6 0.2 - 1.3 mg/dL    Albumin 4.0 3.4 - 5.0 g/dL    Protein Total 6.8 6.8 - 8.8 g/dL    Alkaline Phosphatase 52 40 - 150 U/L    ALT 19 0 - 50 U/L    AST 24 0 - 45 U/L   CBC with platelets   Result Value Ref Range    WBC 6.9 4.0 - 11.0 10e9/L    RBC Count 4.30 3.8 - 5.2 10e12/L    Hemoglobin 12.5 11.7 - 15.7 g/dL    Hematocrit 37.2 35.0 - 47.0 %    MCV 87 78 - 100 fl     MCH 29.1 26.5 - 33.0 pg    MCHC 33.6 31.5 - 36.5 g/dL    RDW 13.7 10.0 - 15.0 %    Platelet Count 203 150 - 450 10e9/L       If you have any questions or concerns, please call the clinic at the number listed above.       Sincerely,        Tristian Tyson MD/nr

## 2018-01-10 LAB
ALBUMIN SERPL-MCNC: 4 G/DL (ref 3.4–5)
ALP SERPL-CCNC: 52 U/L (ref 40–150)
ALT SERPL W P-5'-P-CCNC: 19 U/L (ref 0–50)
ANION GAP SERPL CALCULATED.3IONS-SCNC: 9 MMOL/L (ref 3–14)
AST SERPL W P-5'-P-CCNC: 24 U/L (ref 0–45)
BILIRUB SERPL-MCNC: 0.6 MG/DL (ref 0.2–1.3)
BUN SERPL-MCNC: 20 MG/DL (ref 7–30)
CALCIUM SERPL-MCNC: 8.9 MG/DL (ref 8.5–10.1)
CHLORIDE SERPL-SCNC: 102 MMOL/L (ref 94–109)
CHOLEST SERPL-MCNC: 176 MG/DL
CO2 SERPL-SCNC: 27 MMOL/L (ref 20–32)
CREAT SERPL-MCNC: 0.7 MG/DL (ref 0.52–1.04)
GFR SERPL CREATININE-BSD FRML MDRD: 78 ML/MIN/1.7M2
GLUCOSE SERPL-MCNC: 86 MG/DL (ref 70–99)
HDLC SERPL-MCNC: 32 MG/DL
LDLC SERPL CALC-MCNC: 87 MG/DL
NONHDLC SERPL-MCNC: 144 MG/DL
POTASSIUM SERPL-SCNC: 3.4 MMOL/L (ref 3.4–5.3)
PROT SERPL-MCNC: 6.8 G/DL (ref 6.8–8.8)
SODIUM SERPL-SCNC: 138 MMOL/L (ref 133–144)
TRIGL SERPL-MCNC: 284 MG/DL

## 2018-03-29 ENCOUNTER — NURSE TRIAGE (OUTPATIENT)
Dept: NURSING | Facility: CLINIC | Age: 83
End: 2018-03-29

## 2018-03-29 ENCOUNTER — TELEPHONE (OUTPATIENT)
Dept: FAMILY MEDICINE | Facility: CLINIC | Age: 83
End: 2018-03-29

## 2018-03-29 ENCOUNTER — OFFICE VISIT (OUTPATIENT)
Dept: FAMILY MEDICINE | Facility: CLINIC | Age: 83
End: 2018-03-29
Payer: COMMERCIAL

## 2018-03-29 VITALS
WEIGHT: 160 LBS | OXYGEN SATURATION: 96 % | SYSTOLIC BLOOD PRESSURE: 143 MMHG | BODY MASS INDEX: 30.23 KG/M2 | DIASTOLIC BLOOD PRESSURE: 74 MMHG | TEMPERATURE: 98.7 F | RESPIRATION RATE: 14 BRPM | HEART RATE: 70 BPM

## 2018-03-29 DIAGNOSIS — E11.9 TYPE 2 DIABETES MELLITUS WITHOUT COMPLICATION, WITHOUT LONG-TERM CURRENT USE OF INSULIN (H): Primary | ICD-10-CM

## 2018-03-29 DIAGNOSIS — R41.0 CONFUSION: ICD-10-CM

## 2018-03-29 DIAGNOSIS — Z86.73 HISTORY OF TIA (TRANSIENT ISCHEMIC ATTACK) AND STROKE: ICD-10-CM

## 2018-03-29 LAB
GLUCOSE BLD-MCNC: 76 MG/DL (ref 70–99)
HBA1C MFR BLD: 5.7 % (ref 0–6.4)

## 2018-03-29 PROCEDURE — 83036 HEMOGLOBIN GLYCOSYLATED A1C: CPT | Performed by: FAMILY MEDICINE

## 2018-03-29 PROCEDURE — 82947 ASSAY GLUCOSE BLOOD QUANT: CPT | Performed by: FAMILY MEDICINE

## 2018-03-29 PROCEDURE — 36415 COLL VENOUS BLD VENIPUNCTURE: CPT | Performed by: FAMILY MEDICINE

## 2018-03-29 PROCEDURE — 99214 OFFICE O/P EST MOD 30 MIN: CPT | Performed by: FAMILY MEDICINE

## 2018-03-29 ASSESSMENT — ANXIETY QUESTIONNAIRES
6. BECOMING EASILY ANNOYED OR IRRITABLE: NOT AT ALL
GAD7 TOTAL SCORE: 0
1. FEELING NERVOUS, ANXIOUS, OR ON EDGE: NOT AT ALL
4. TROUBLE RELAXING: NOT AT ALL
7. FEELING AFRAID AS IF SOMETHING AWFUL MIGHT HAPPEN: NOT AT ALL
3. WORRYING TOO MUCH ABOUT DIFFERENT THINGS: NOT AT ALL
2. NOT BEING ABLE TO STOP OR CONTROL WORRYING: NOT AT ALL
5. BEING SO RESTLESS THAT IT IS HARD TO SIT STILL: NOT AT ALL
7. FEELING AFRAID AS IF SOMETHING AWFUL MIGHT HAPPEN: NOT AT ALL

## 2018-03-29 ASSESSMENT — PATIENT HEALTH QUESTIONNAIRE - PHQ9
SUM OF ALL RESPONSES TO PHQ QUESTIONS 1-9: 0
10. IF YOU CHECKED OFF ANY PROBLEMS, HOW DIFFICULT HAVE THESE PROBLEMS MADE IT FOR YOU TO DO YOUR WORK, TAKE CARE OF THINGS AT HOME, OR GET ALONG WITH OTHER PEOPLE: NOT DIFFICULT AT ALL
SUM OF ALL RESPONSES TO PHQ QUESTIONS 1-9: 0

## 2018-03-29 NOTE — PATIENT INSTRUCTIONS
Results for orders placed or performed in visit on 03/29/18   Hemoglobin A1c   Result Value Ref Range    Hemoglobin A1C 5.7 0 - 6.4 %        Please call [facility] at [phone] to schedule your brain MRI/MRA.    Locations:   [facility], [address] [place] [address]      I recommend a daily aspirin.

## 2018-03-29 NOTE — PROGRESS NOTES
Patient was seen today in clinic.  I discussed results in clinic, please see clinic progress note.    Chelita Curtis 3/29/2018

## 2018-03-29 NOTE — MR AVS SNAPSHOT
After Visit Summary   3/29/2018    Reanna Garza    MRN: [id number]           Patient Information     Date Of Birth          12/25/1927        Visit Information        Provider Department      3/29/2018 1:20 PM Chelita Curtis MD [facility]        Today's Diagnoses     Type 2 diabetes mellitus without complication, without long-term current use of insulin (H)    -  1    Confusion        History of TIA (transient ischemic attack) and stroke          Care Instructions    Results for orders placed or performed in visit on 03/29/18   Hemoglobin A1c   Result Value Ref Range    Hemoglobin A1C 5.7 0 - 6.4 %        Please call [facility] at [phone] to schedule your brain MRI/MRA.    Locations:   [facility], [address] [place] [address]      I recommend a daily aspirin.          Follow-ups after your visit        Future tests that were ordered for you today     Open Future Orders        Priority Expected Expires Ordered    US Carotid Bilateral Routine  3/29/2019 3/29/2018    MR Abdomen w/o & w Contrast Routine  3/29/2019 3/29/2018            Who to contact     If you have questions or need follow up information about today's clinic visit or your schedule please contact [facility] directly at [phone].  Normal or non-critical lab and imaging results will be communicated to you by MyChart, letter or phone within 4 business days after the clinic has received the results. If you do not hear from us within 7 days, please contact the clinic through MyChart or phone. If you have a critical or abnormal lab result, we will notify you by phone as soon as possible.  Submit refill requests through [employer] or call your pharmacy and they will forward the refill request to us. Please allow 3 business days for your refill to be completed.          Additional Information About Your  "Visit        [employer] Information     [employer] lets you send messages to your doctor, view your test results, renew your prescriptions, schedule appointments and more. To sign up, go to [email or web address] . Click on \"Log in\" on the left side of the screen, which will take you to the Welcome page. Then click on \"Sign up Now\" on the right side of the page.     You will be asked to enter the access code listed below, as well as some personal information. Please follow the directions to create your username and password.     Your access code is: [email or web address]  Expires: 2018  2:56 PM     Your access code will  in 90 days. If you need help or a new code, please call your [facility] clinic or [phone].        Care EveryWhere ID     This is your Care EveryWhere ID. This could be used by other organizations to access your [facility] medical records  [id number]        Your Vitals Were     Pulse Temperature Respirations Pulse Oximetry BMI (Body Mass Index)       70 98.7  F (37.1  C) (Tympanic) 14 96% 30.23 kg/m2        Blood Pressure from Last 3 Encounters:   18 143/74   18 130/73   10/11/17 160/87    Weight from Last 3 Encounters:   18 160 lb (72.6 kg)   18 158 lb 8 oz (71.9 kg)   10/11/17 158 lb 8 oz (71.9 kg)              We Performed the Following     Glucose whole blood     Hemoglobin A1c        Primary Care Provider Office Phone # Fax #    Chelita Curtis [phone] [address]  [facility] [address]        Equal Access to Services     [facility] : Hadii valencia Lenz, waaxda luqadaha, qaybta kaalmada kellee, tresa osullivan. So [facility] [phone].    ATENCIÓN: Si habla español, tiene a mejia disposición servicios gratuitos de asistencia lingüística. Llame al [phone].    We comply with applicable federal civil rights laws and [place] laws. We do not discriminate on the basis of race, color, national " origin, age, disability, sex, sexual orientation, or gender identity.            Thank you!     Thank you for choosing [facility]  for your care. Our goal is always to provide you with excellent care. Hearing back from our patients is one way we can continue to improve our services. Please take a few minutes to complete the written survey that you may receive in the mail after your visit with us. Thank you!             Your Updated Medication List - Protect others around you: Learn how to safely use, store and throw away your medicines at www.disposemymeds.org.          This list is accurate as of 3/29/18  2:29 PM.  Always use your most recent med list.                   Brand Name Dispense Instructions for use Diagnosis    * ACCU-CHEK PATTI Aliya      1 Device daily    Type 2 diabetes, HbA1C goal < 8% (H)       * blood glucose monitoring meter device kit     1 kit    Use to test blood sugars 4 times daily or as directed.    Type 2 diabetes mellitus without complication (H)       acetaminophen 650 MG 8 hour tablet     100 tablet    Take 650 mg by mouth every 4 hours as needed for mild pain or fever    Stroke (H)       ADVIL PO           aspirin 162 MG EC tablet      Take 1 tablet (162 mg) by mouth daily    Stroke (H)       blood glucose monitoring lancets     2 Box    Use to test blood sugar 2 times daily or as directed. Compatible with glucometer.    Type 2 diabetes, HbA1C goal < 8% (H)       * blood glucose monitoring test strip    no brand specified    1 Box    Use to test blood sugar 2 times daily or as directed.    Type 2 diabetes, HbA1C goal < 8% (H)       * ACCU-CHEK PATTI PLUS test strip   Generic drug:  blood glucose monitoring           * blood glucose monitoring test strip    ACCU-CHEK PATTI PLUS    1 Box    Use to test blood sugar 4 times daily or as directed.    Type 2 diabetes, HbA1C goal < 8% (H)       calcium citrate-vitamin D 315-200 MG-UNIT Tabs per tablet    CALCIUM + D    60 tablet     Take 2 tablets by mouth daily    Routine general medical examination at a health care facility       glipiZIDE 5 MG tablet    GLUCOTROL    45 tablet    Take 1/2 pill every day.    Type 2 diabetes mellitus without complication, without long-term current use of insulin (H)       hydrochlorothiazide 12.5 MG capsule    MICROZIDE    90 capsule    TAKE 1 CAPSULE(12.5 MG) BY MOUTH DAILY    Hypertension goal BP (blood pressure) < 140/90       hypromellose-dextran 0.3-0.1% opthalmic solution      Apply to eye every 6 hours as needed        losartan 50 MG tablet    COZAAR    90 tablet    Take 1 tablet (50 mg) by mouth daily    Hypertension goal BP (blood pressure) < 140/90       lovastatin 40 MG tablet    MEVACOR    90 tablet    Take 1 tablet (40 mg) by mouth At Bedtime    Hyperlipidemia LDL goal <100       vitamin D 2000 UNITS tablet     100 tablet    Take 4,000 Units by mouth daily    Vitamin D deficiency       * Notice:  This list has 5 medication(s) that are the same as other medications prescribed for you. Read the directions carefully, and ask your doctor or other care provider to review them with you.

## 2018-03-29 NOTE — TELEPHONE ENCOUNTER
Clinic Action Needed?  YES. Dtr Marcella requests Dr. Curtis advice on med dosage. Stay at 1 tab daily?  Or decrease? Marcella [phone]    FNA Triage Call   Presenting Problem: Dtr Marcella has info for Dr. Curtis regarding glipizide dose as discussed at visit today (3/29). Dtr thought pt is taking glipizide (5mg) 1/2 tab once a day. Dtr talked w/sister who gives pt her meds each day and learned that pt has actually been taking glipizide (5mg) 1 whole tab once a day. This is because she got a letter from previous doctor advising pt take 1 tab daily. Marcella wants Dr Curtis advice on what to do. There is concern about confusion possibly being related to low BGs.       Patient Teaching/Discussion: Rahul Naranjo will route message to Dr Curtis for tomorrow. Clinic will get back to her w/ Dr Curtis advice re: med dosage. Advised dtr to call the clinic if she does not hear from them by early aft tomorrow. Call right away if new or increased sx or concerns. Dtr voiced understanding and agreement. Delmis Mtz RN/FNA        Routed to:  Triage [facility]

## 2018-03-29 NOTE — PROGRESS NOTES
SUBJECTIVE:   Reanna Garza is a 89 year old female who presents to clinic today for the following health issues:    Acute confusion with history of stroke  Her daughters have noticed confusion similar to symptoms when Reanna has a stroke 3 years ago, some disorientation, without weakness, slurred speech, falls. She did have some slurred speech in January of this year. Her daughter accompanies her today and provides significant history. Her daughter feels she can tell the difference between confusion related to stroke vs hypoglycemia, and she's also very concerned that Reanna's A1C be less than 6.3 because that was the A1C level when Reanna had her stroke.  They are not checking blood sugars at home and have not checked them during periods of confusion.  Normally Reanna is well orientated to day, season, president, etc.    Diabetes Follow-up      Patient is checking blood sugars: not at all-  Told she didn't need to.    Diabetic concerns: None and other - confusion     Symptoms of hypoglycemia (low blood sugar): none     Paresthesias (numbness or burning in feet) or sores: No     Date of last diabetic eye exam: 03/2018      BP Readings from Last 2 Encounters:   01/09/18 130/73   10/11/17 160/87     Hemoglobin A1C (%)   Date Value   01/09/2018 6.3 (H)   10/11/2017 5.7     LDL Cholesterol Calculated (mg/dL)   Date Value   01/09/2018 87   12/13/2016 105 (H)       Amount of exercise or physical activity: around the house    Problems taking medications regularly: No    Medication side effects: none    Diet: regular (no restrictions)    BP Readings from Last 3 Encounters:   03/29/18 143/74   01/09/18 130/73   10/11/17 160/87          Problem list and histories reviewed & adjusted, as indicated.  Additional history: as documented    Patient Active Problem List   Diagnosis     Abdominal aortic aneurysm (H)     Restless legs syndrome (RLS)     Abnormality of gait     Imbalance     HYPERLIPIDEMIA LDL GOAL <100     Low  back pain     Lumbar spondylosis     Right hip pain     Trochanteric bursitis     Hypertension goal BP (blood pressure) < 140/90     Shoulder pain     Disturbance of skin sensation     Macular degeneration (senile) of retina     Chronic rhinitis     Stroke (H)     Vitamin D deficiency     Advanced care planning/counseling discussion     Type 2 diabetes mellitus without complication (H)     Past Surgical History:   Procedure Laterality Date     BLEPHAROPLASTY, BROW LIFT, COMBINED  6/10/2013    Procedure: COMBINED BLEPHAROPLASTY, BROW LIFT;  BILATERAL BLEPHAROPLASTY WITH INTERNAL BROW LIFT;  Surgeon: Suhas, Chip HERNANDEZ MD;  Location:  [facility]     C CHOLECYSTOENTEROSTOMY      thaddeus     C TOTAL ABDOM HYSTERECTOMY       C TOTAL KNEE ARTHROPLASTY  6/04    x2     EXTRACAPSULAR CATARACT EXTRATION WITH INTRAOCULAR LENS IMPLANT      both eyes     HC SHOULDER ARTHROSCOPY, DX  12/05    rotator cuff tear repair     REPLACEMENT SOCKET, SHOULDER DISARTICULATION/INTERSCAPULAR THORACIC, MOLDED TO  6/07    right       Social History   Substance Use Topics     Smoking status: Former Smoker     Quit date: 11/7/1999     Smokeless tobacco: Never Used     Alcohol use Yes      Comment: rare     Family History   Problem Relation Age of Onset     CANCER Brother      CANCER Sister      Breast Cancer Sister      CANCER Sister      CANCER Sister      CANCER Sister      Alzheimer Disease Sister          Current Outpatient Prescriptions   Medication Sig Dispense Refill     glipiZIDE (GLUCOTROL) 5 MG tablet Take 1/2 pill every day. 45 tablet 0     blood glucose monitoring (ACCU-CHEK PATTI PLUS) test strip Use to test blood sugar 4 times daily or as directed. 1 Box 1     lovastatin (MEVACOR) 40 MG tablet Take 1 tablet (40 mg) by mouth At Bedtime 90 tablet 3     hydrochlorothiazide (MICROZIDE) 12.5 MG capsule TAKE 1 CAPSULE(12.5 MG) BY MOUTH DAILY 90 capsule 3     losartan (COZAAR) 50 MG tablet Take 1 tablet (50 mg) by mouth daily 90 tablet 3     blood  glucose monitoring (ACCU-CHEK PATTI PLUS) meter device kit Use to test blood sugars 4 times daily or as directed. 1 kit 0     blood glucose monitoring (ACCU-CHEK MULTICLIX) lancets Use to test blood sugar 2 times daily or as directed. Compatible with glucometer. 2 Box 0     Ibuprofen (ADVIL PO)        ACCU-CHEK PATTI PLUS test strip        Blood Glucose Monitoring Suppl (ACCU-CHEK PATTI) DEANNA 1 Device daily       blood glucose test strip Use to test blood sugar 2 times daily or as directed. 1 Box prn     Cholecalciferol (VITAMIN D) 2000 UNITS tablet Take 4,000 Units by mouth daily 100 tablet 3     ARTIFICIAL TEARS 0.1-0.3 % SOLN Apply to eye every 6 hours as needed       calcium citrate-vitamin D (CALCIUM + D) 315-200 MG-UNIT TABS Take 2 tablets by mouth daily 60 tablet      acetaminophen 650 MG TABS Take 650 mg by mouth every 4 hours as needed for mild pain or fever 100 tablet      aspirin  MG EC tablet Take 1 tablet (162 mg) by mouth daily       Allergies   Allergen Reactions     Codeine      Hives       Codeine Sulfate Hives     Penicillins Hives     Strawberry Hives     Recent Labs   Lab Test  03/29/18   1339  01/09/18   1452  10/11/17   1410  12/13/16   1057  12/13/16   1056  06/08/16   1333   05/26/15   1055   A1C  5.7  6.3*  5.7   --   6.0  5.9   < >  5.9   LDL   --   87   --   105*   --   97   < >   --    HDL   --   32*   --   38*   --   33*   < >   --    TRIG   --   284*   --   192*   --   210*   < >   --    ALT   --   19  18   --   16  20   < >   --    CR   --   0.70  0.79   --   0.90  1.00   < >  0.71   GFRESTIMATED   --   78  69   --   59*  53*   < >  78   GFRESTBLACK   --   >90  83   --   71  64   < >  >90   GFR Calc     POTASSIUM   --   3.4  3.5   --   3.7  3.7   < >  3.0*   TSH   --    --   2.29   --    --    --    --   1.29    < > = values in this interval not displayed.      BP Readings from Last 3 Encounters:   03/29/18 143/74   01/09/18 130/73   10/11/17 160/87    Wt Readings  from Last 3 Encounters:   03/29/18 160 lb (72.6 kg)   01/09/18 158 lb 8 oz (71.9 kg)   10/11/17 158 lb 8 oz (71.9 kg)                    Reviewed and updated as needed this visit by clinical staff       Reviewed and updated as needed this visit by Provider         ROS:  Constitutional, HEENT, cardiovascular, pulmonary, GI, , musculoskeletal, neuro, skin, endocrine and psych systems are negative, except as otherwise noted.    OBJECTIVE:     /74  Pulse 70  Temp 98.7  F (37.1  C) (Tympanic)  Resp 14  Wt 160 lb (72.6 kg)  SpO2 96%  BMI 30.23 kg/m2  Body mass index is 30.23 kg/(m^2).  GENERAL: healthy, alert and no distress  NECK: no adenopathy, no asymmetry, masses, or scars, thyroid normal to palpation and no carotid bruits  RESP: lungs clear to auscultation - no rales, rhonchi or wheezes  CV: regular rate and rhythm, normal S1 S2, no S3 or S4, no murmur, click or rub, no peripheral edema and peripheral pulses strong  ABDOMEN: soft, nontender, no hepatosplenomegaly, no masses and bowel sounds normal  Neuro:  Pupils equal, round, reactive to light. Extraocular movements full. Visual fields full. Face moves symmetrically. Tongue midline.  Neck without bruits. CV: S1, S2. Motor strength 4/5, symmetrical all extremities. Reflexes were 2/4. T Gait: she ade from a chair without difficulty and has a mildly broad-based gait.  PSYCH: mentation appears normal, affect normal/bright and disorientated; doesn't know day of week, month, season or year ([facility] 2058); doesn't know who the president is, states she doesn't recognize the name Obama, knows who Zachery is but doesn't believe he's president, thinks she recognizes Tom as current president.  She can report her own name, date of birth, age and home address and phone number.  Daughter reports that Reanna is usually very aware of current events and is aware (and unhappy) that Zachery is president normally.    ASSESSMENT/PLAN:     1. Type 2 diabetes mellitus without  complication, without long-term current use of insulin (H)  Well controlled, possibly over controlled,   Current post prandial glucose 76.  A1C has dropped 0.6 in c. 3 months.  Daughter will look into meds at home to see if Reanna is getting full dose, not half, and to try to check morning fasting blood sugars, she will report back to me early next week.  - Hemoglobin A1c  - Glucose whole blood    2. Confusion  Etiology unclear  Acute  Recurrent episodes  Recurrent stroke vs hypoglycemia vs other (poor nutrition, B12 deficiency, psychiatric, dehydration) which seem less likely  See #1 above  I will also evaluate for carotid stenosis, see order  Repeat MRI/MRA, last done over 2 years ago    I discussed CV risk factors in detail with patient and her daughter to try to convince them that excessively tight blood glucose control might actually be harming Reanna, at end of discussion daughter did seem to understand.  Note that other risk factors are well controlled (BP, LDL)    - Hemoglobin A1c  - MR Abdomen w/o & w Contrast; Future  - Glucose whole blood  - US Carotid Bilateral; Future    3. History of TIA (transient ischemic attack) and stroke  See above  - MR Abdomen w/o & w Contrast; Future  - Glucose whole blood  - US Carotid Bilateral; Future      Chelita Curtis MD  [facility]    Answers for HPI/ROS submitted by the patient on 3/29/2018   If you checked off any problems, how difficult have these problems made it for you to do your work, take care of things at home, or get along with other people?: Not difficult at all  PHQ9 TOTAL SCORE: 0  MELODIE 7 TOTAL SCORE: 0

## 2018-03-29 NOTE — TELEPHONE ENCOUNTER
See 3/29 phone encounter routed to . Daughter declined triage. Wanted to leave message for doctor. Dtr made aware clinic closed tonight, message will get to doctor's team tomorrow.  Delmis Mtz RN/FNA

## 2018-03-30 ENCOUNTER — TELEPHONE (OUTPATIENT)
Dept: FAMILY MEDICINE | Facility: CLINIC | Age: 83
End: 2018-03-30

## 2018-03-30 ASSESSMENT — ANXIETY QUESTIONNAIRES: GAD7 TOTAL SCORE: 0

## 2018-03-30 ASSESSMENT — PATIENT HEALTH QUESTIONNAIRE - PHQ9: SUM OF ALL RESPONSES TO PHQ QUESTIONS 1-9: 0

## 2018-03-30 NOTE — TELEPHONE ENCOUNTER
"Reason for Call:  Call Back    Detailed comments: THERE IS NO \"CONSENT TO COMMUNICATE\" FORM ON FILE FOR DAUGHTER MARLO. She is calling about diabetes medication. At office visit in Jan, 2018 it was discussed medication to be 2.5mg but Dr Tyson letter was 5mg. Question is should pt be off medication for awhile or go down to 2.5mg? She has been taking 5mg since Jan and will hold off medication until daughter hears back from Dr Curtis today.    Phone Number Patient can be reached at: [phone] Marlo    Best Time: Any time    Can we leave a detailed message on this number? YES    Call taken on 3/30/2018 at 10:22 AM by Malika Hendricks    "

## 2018-03-30 NOTE — TELEPHONE ENCOUNTER
Gave Marcella this message from Dr. Curtis.  Pt was getting the 5 mg daily.    They will monitor how Reanna seems. Will hold off on the MRI and see how she does in the next few weeks.  TA Jansen

## 2018-03-30 NOTE — TELEPHONE ENCOUNTER
Please take 1/2 tab of glipizide/Glucotrol daily, that is, 2.5 mg daily and check blood sugars 1-2 times per day, every morning and at various other times during the day (1-2 hours after meals),and whenever she seems more confused. Thanks!  Sincerely,  Dr. Chelita Curtis MD  3/30/2018

## 2018-06-27 ENCOUNTER — OFFICE VISIT (OUTPATIENT)
Dept: FAMILY MEDICINE | Facility: CLINIC | Age: 83
End: 2018-06-27
Payer: COMMERCIAL

## 2018-06-27 VITALS
SYSTOLIC BLOOD PRESSURE: 129 MMHG | WEIGHT: 154 LBS | RESPIRATION RATE: 14 BRPM | DIASTOLIC BLOOD PRESSURE: 71 MMHG | BODY MASS INDEX: 29.1 KG/M2 | HEART RATE: 71 BPM | TEMPERATURE: 98.1 F | OXYGEN SATURATION: 97 %

## 2018-06-27 DIAGNOSIS — E11.9 TYPE 2 DIABETES MELLITUS WITHOUT COMPLICATION, WITHOUT LONG-TERM CURRENT USE OF INSULIN (H): ICD-10-CM

## 2018-06-27 DIAGNOSIS — E78.5 HYPERLIPIDEMIA LDL GOAL <100: Primary | ICD-10-CM

## 2018-06-27 DIAGNOSIS — R41.0 CONFUSION: ICD-10-CM

## 2018-06-27 LAB
ALBUMIN SERPL-MCNC: 4 G/DL (ref 3.4–5)
ALP SERPL-CCNC: 56 U/L (ref 40–150)
ALT SERPL W P-5'-P-CCNC: 24 U/L (ref 0–50)
ANION GAP SERPL CALCULATED.3IONS-SCNC: 12 MMOL/L (ref 3–14)
AST SERPL W P-5'-P-CCNC: 24 U/L (ref 0–45)
BILIRUB SERPL-MCNC: 0.4 MG/DL (ref 0.2–1.3)
BUN SERPL-MCNC: 30 MG/DL (ref 7–30)
CALCIUM SERPL-MCNC: 9.4 MG/DL (ref 8.5–10.1)
CHLORIDE SERPL-SCNC: 107 MMOL/L (ref 94–109)
CHOLEST SERPL-MCNC: 145 MG/DL
CO2 SERPL-SCNC: 24 MMOL/L (ref 20–32)
CREAT SERPL-MCNC: 0.82 MG/DL (ref 0.52–1.04)
GFR SERPL CREATININE-BSD FRML MDRD: 66 ML/MIN/1.7M2
GLUCOSE SERPL-MCNC: 133 MG/DL (ref 70–99)
HDLC SERPL-MCNC: 35 MG/DL
LDLC SERPL CALC-MCNC: 58 MG/DL
NONHDLC SERPL-MCNC: 110 MG/DL
POTASSIUM SERPL-SCNC: 3.2 MMOL/L (ref 3.4–5.3)
PROT SERPL-MCNC: 7.4 G/DL (ref 6.8–8.8)
SODIUM SERPL-SCNC: 143 MMOL/L (ref 133–144)
TRIGL SERPL-MCNC: 261 MG/DL

## 2018-06-27 PROCEDURE — 80053 COMPREHEN METABOLIC PANEL: CPT | Performed by: FAMILY MEDICINE

## 2018-06-27 PROCEDURE — 80061 LIPID PANEL: CPT | Performed by: FAMILY MEDICINE

## 2018-06-27 PROCEDURE — 99214 OFFICE O/P EST MOD 30 MIN: CPT | Performed by: FAMILY MEDICINE

## 2018-06-27 PROCEDURE — 36415 COLL VENOUS BLD VENIPUNCTURE: CPT | Performed by: FAMILY MEDICINE

## 2018-06-27 NOTE — MR AVS SNAPSHOT
After Visit Summary   6/27/2018    Reanna Garza    MRN: [id number]           Patient Information     Date Of Birth          12/25/1927        Visit Information        Provider Department      6/27/2018 10:00 AM Gilbert Herrera MD [facility]        Today's Diagnoses     Hyperlipidemia LDL goal <100    -  1    Type 2 diabetes mellitus without complication, without long-term current use of insulin (H)        Confusion           Follow-ups after your visit        Your next 10 appointments already scheduled     Jul 12, 2018 11:00 AM CDT   MR ABDOMEN W/O & W CONTRAST with [email or web address]   [facility] ([facility])    [address]  [place] [facility] [address] [phone]           Take your medicines as usual, unless your doctor tells you not to. Bring a list of your current medicines to your exam (including vitamins, minerals and over-the-counter drugs). Also bring the results of similar scans you may have had.    You may or may not receive IV contrast for this exam pending the discretion of the Radiologist.   Do not eat or drink for 6 hours prior to exam.  The MRI machine uses a strong magnet. Please wear clothes without metal (snaps, zippers). A sweatsuit works well, or we may give you a hospital gown.  Please remove any body piercings and hair extensions before you arrive. You will also remove watches, jewelry, hairpins, wallets, dentures, partial dental plates and hearing aids. You may wear contact lenses, and you may be able to wear your rings. We have a safe place to keep your personal items, but it is safer to leave them at home.  **IMPORTANT** THE INSTRUCTIONS BELOW ARE ONLY FOR THOSE PATIENTS WHO HAVE BEEN PRESCRIBED SEDATION OR GENERAL ANESTHESIA DURING THEIR MRI PROCEDURE:  IF YOUR DOCTOR PRESCRIBED ORAL SEDATION (take medicine to help you relax during your exam):   You must get the medicine from your doctor (oral  medication) before you arrive. Bring the medicine to the exam. Do not take it at home. You ll be told when to take it upon arriving for your exam.   Arrive one hour early. Bring someone who can take you home after the test. Your medicine will make you sleepy. After the exam, you may not drive, take a bus or take a taxi by yourself.  IF YOUR DOCTOR PRESCRIBED IV SEDATION:   Arrive one hour early. Bring someone who can take you home after the test. Your medicine will make you sleepy. After the exam, you may not drive, take a bus or take a taxi by yourself.   No eating 6 hours before your exam. You may have clear liquids up until 4 hours before your exam. (Clear liquids include water, clear tea, black coffee and fruit juice without pulp.)  IF YOUR DOCTOR PRESCRIBED ANESTHESIA (be asleep for your exam):   Arrive 1 1/2 hours early. Bring someone who can take you home after the test. You may not drive, take a bus or take a taxi by yourself.   No eating 8 hours before your exam. You may have clear liquids up until 4 hours before your exam. (Clear liquids include water, clear tea, black coffee and fruit juice without pulp.)   You will spend four to five hours in the recovery room.  If you have any questions, please contact your Imaging Department exam site.            Jul 12, 2018 12:00 PM CDT   US CAROTID BILATERAL with [email or web address]   [facility] ([facility])    [address] [address] [address] [phone]           Please bring a list of your medicines (including vitamins, minerals and over-the-counter drugs). Also, tell your doctor about any allergies you may have. Wear comfortable clothes and leave your valuables at home.  You do not need to do anything special to prepare for your exam.  Please call the Imaging Department at your exam site with any questions.              Who to contact     If you have questions or need follow up information about  today's clinic visit or your schedule please contact [facility] directly at [phone].  Normal or non-critical lab and imaging results will be communicated to you by MyChart, letter or phone within 4 business days after the clinic has received the results. If you do not hear from us within 7 days, please contact the clinic through MyChart or phone. If you have a critical or abnormal lab result, we will notify you by phone as soon as possible.  Submit refill requests through jslyhl or call your pharmacy and they will forward the refill request to us. Please allow 3 business days for your refill to be completed.          Additional Information About Your Visit        Care EveryWhere ID     This is your Care EveryWhere ID. This could be used by other organizations to access your [place] medical records  [id number]        Your Vitals Were     Pulse Temperature Respirations Pulse Oximetry BMI (Body Mass Index)       71 98.1  F (36.7  C) (Tympanic) 14 97% 29.1 kg/m2        Blood Pressure from Last 3 Encounters:   06/27/18 129/71   03/29/18 143/74   01/09/18 130/73    Weight from Last 3 Encounters:   06/27/18 154 lb (69.9 kg)   03/29/18 160 lb (72.6 kg)   01/09/18 158 lb 8 oz (71.9 kg)              We Performed the Following     Comprehensive metabolic panel (BMP + Alb, Alk Phos, ALT, AST, Total. Bili, TP)     Lipid Profile (Chol, Trig, HDL, LDL calc)        Primary Care Provider Office Phone # Fax #    Chelita Curtis [phone] [address] [place] [address]        Equal Access to Services     ZULMA BROOKS : Hadii valencia Lenz, wasamanda betzaida, qatresa eubanks. So [facility] [phone].    ATENCIÓN: Si habla español, tiene a mejia disposición servicios gratuitos de asistencia lingüística. Joey al [phone].    We comply with applicable federal civil rights laws and [place] laws. We do not  discriminate on the basis of race, color, national origin, age, disability, sex, sexual orientation, or gender identity.            Thank you!     Thank you for choosing [facility]  for your care. Our goal is always to provide you with excellent care. Hearing back from our patients is one way we can continue to improve our services. Please take a few minutes to complete the written survey that you may receive in the mail after your visit with us. Thank you!             Your Updated Medication List - Protect others around you: Learn how to safely use, store and throw away your medicines at www.disposemymeds.org.          This list is accurate as of 6/27/18 11:59 PM.  Always use your most recent med list.                   Brand Name Dispense Instructions for use Diagnosis    * ACCU-CHEK PATTI Aliya      1 Device daily    Type 2 diabetes, HbA1C goal < 8% (H)       * blood glucose monitoring meter device kit     1 kit    Use to test blood sugars 4 times daily or as directed.    Type 2 diabetes mellitus without complication (H)       acetaminophen 650 MG 8 hour tablet     100 tablet    Take 650 mg by mouth every 4 hours as needed for mild pain or fever    Stroke (H)       ADVIL PO           aspirin 162 MG EC tablet      Take 1 tablet (162 mg) by mouth daily    Stroke (H)       blood glucose monitoring lancets     2 Box    Use to test blood sugar 2 times daily or as directed. Compatible with glucometer.    Type 2 diabetes, HbA1C goal < 8% (H)       * blood glucose monitoring test strip    no brand specified    1 Box    Use to test blood sugar 2 times daily or as directed.    Type 2 diabetes, HbA1C goal < 8% (H)       * ACCU-CHEK PATTI PLUS test strip   Generic drug:  blood glucose monitoring           * blood glucose monitoring test strip    ACCU-CHEK PATTI PLUS    1 Box    Use to test blood sugar 4 times daily or as directed.    Type 2 diabetes, HbA1C goal < 8% (H)       calcium citrate-vitamin D 315-200  MG-UNIT Tabs per tablet    CALCIUM + D    60 tablet    Take 2 tablets by mouth daily    Routine general medical examination at a health care facility       hydrochlorothiazide 12.5 MG capsule    MICROZIDE    90 capsule    TAKE 1 CAPSULE(12.5 MG) BY MOUTH DAILY    Hypertension goal BP (blood pressure) < 140/90       hypromellose-dextran 0.3-0.1% 0.1-0.3 % Soln ophthalmic solution   Generic drug:  hypromellose-dextran      Apply to eye every 6 hours as needed        losartan 50 MG tablet    COZAAR    90 tablet    Take 1 tablet (50 mg) by mouth daily    Hypertension goal BP (blood pressure) < 140/90       lovastatin 40 MG tablet    MEVACOR    90 tablet    Take 1 tablet (40 mg) by mouth At Bedtime    Hyperlipidemia LDL goal <100       vitamin D 2000 units tablet     100 tablet    Take 4,000 Units by mouth daily    Vitamin D deficiency       * Notice:  This list has 5 medication(s) that are the same as other medications prescribed for you. Read the directions carefully, and ask your doctor or other care provider to review them with you.

## 2018-06-27 NOTE — PROGRESS NOTES
SUBJECTIVE:   Reanna Garza is a 89 year old female who presents to clinic today for the following health issues:      Diabetes Follow-up    Patient is checking blood sugars: once daily.  Results are as follows:         am - 100-140    Diabetic concerns: None     Symptoms of hypoglycemia (low blood sugar): none     Paresthesias (numbness or burning in feet) or sores: No     Date of last diabetic eye exam: couple weeks ago     Diabetes Management Resources    Hyperlipidemia Follow-Up      Rate your low fat/cholesterol diet?: fair    Taking statin?  Yes, no muscle aches from statin    Other lipid medications/supplements?:  none    Hypertension Follow-up      Outpatient blood pressures are being checked at home.     Low Salt Diet: low salt    BP Readings from Last 2 Encounters:   18 143/74   18 130/73     Hemoglobin A1C (%)   Date Value   2018 5.7   2018 6.3 (H)     LDL Cholesterol Calculated (mg/dL)   Date Value   2018 87   2016 105 (H)         Pt needs medication refills.   Her fasting BG range over the last month are 103-140    Daughter mentions that pt has been lately referring to her dad who is . She sometimes worries that he hasn't come back home yet. She told her other daughter that he might be upstairs. She doesn't seem to be agitated. Daughters haven't seen her talk to anyone. No change in sleep. Yesterday she was able to understand that her father was decreased.       Problem list and histories reviewed & adjusted, as indicated.  Additional history: as documented    Labs reviewed in EPIC    Reviewed and updated as needed this visit by clinical staff       Reviewed and updated as needed this visit by Provider         ROS:  Constitutional, HEENT, cardiovascular, pulmonary, gi and gu systems are negative, except as otherwise noted.    OBJECTIVE:     /71  Pulse 71  Temp 98.1  F (36.7  C) (Tympanic)  Resp 14  Wt 154 lb (69.9 kg)  SpO2 97%  BMI 29.1  kg/m2  Body mass index is 29.1 kg/(m^2).  GENERAL: healthy, alert and no distress  EYES: Eyes grossly normal to inspection  HENT: nose and mouth without ulcers or lesions  PSYCH: pleasant    Diagnostic Test Results:  none     ASSESSMENT/PLAN:     1. Hyperlipidemia LDL goal <100  - lipid panel     2. Type 2 diabetes mellitus without complication, without long-term current use of insulin (H)  - CMP  -   Lab Results   Component Value Date    A1C 5.7 03/29/2018    A1C 6.3 01/09/2018    A1C 5.7 10/11/2017    A1C 6.0 12/13/2016    A1C 5.9 06/08/2016     - diabetes well controlled and pt is currently on glipzide 2.5 mg daily  - due to age and very well controlled diabetes, I discussed with PCP (Dr. Curtis) about discontinuing medication and monitoring her BG. Discontinued medication and advised daughter to check fasting BG and call clinic if consistently greater than 130. Recheck hgba1c in 3 months.     3. Confusion  - H/o stroke and previously seen by PCP for confusion and pending work up. Daughter who was present with pt doesn't like with her and the other daughter isn't present. Pt is able to be redirected during episodes and no harm to self. I advised to monitor symptoms and follow up within the next 1-2 weeks to further discuss with PCP.     Gilbert Herrera MD  [facility]

## 2018-07-06 ENCOUNTER — TELEPHONE (OUTPATIENT)
Dept: FAMILY MEDICINE | Facility: CLINIC | Age: 83
End: 2018-07-06

## 2018-07-06 DIAGNOSIS — I10 BENIGN ESSENTIAL HYPERTENSION: Primary | ICD-10-CM

## 2018-07-06 NOTE — TELEPHONE ENCOUNTER
Left message for daughterMarcella.  Gave this message to the pt.    She gave me another daughter's phone #, Vanessa [phone]. LM at this [phone]. Pt was not sure of the area code.    Updated med list.  TA Jansen

## 2018-07-06 NOTE — TELEPHONE ENCOUNTER
RN, can you please let pt know that I've discussed pts diabetes management with Dr. Curtis. Diabetes is well controlled and she can discontinue Glipizide 2.5 mg daily. Her daughter can continue to check fasting BG and inform clinic if levels are consistently greater than 130. Repeat hgba1c around 06/10/18.   Her recent lab work showed mildly low potassium.   Stable total cholesterol and LDL (bad cholesterol).   Stable kidney functions.   Follow up in one month for lab only visit to recheck kidney functions.   DM

## 2018-07-11 ENCOUNTER — TELEPHONE (OUTPATIENT)
Dept: FAMILY MEDICINE | Facility: CLINIC | Age: 83
End: 2018-07-11

## 2018-07-11 DIAGNOSIS — G45.8 OTHER TRANSIENT CEREBRAL ISCHEMIC ATTACKS AND RELATED SYNDROMES: ICD-10-CM

## 2018-07-11 DIAGNOSIS — R41.0 DISORIENTATION: Primary | ICD-10-CM

## 2018-07-11 DIAGNOSIS — Z86.73 HISTORY OF TIA (TRANSIENT ISCHEMIC ATTACK) AND STROKE: ICD-10-CM

## 2018-07-11 DIAGNOSIS — R41.0 CONFUSION: ICD-10-CM

## 2018-07-11 NOTE — TELEPHONE ENCOUNTER
MRI and carotid u/s is scheduled for 1100 on 7/12/18.  Is the MR Abdomen w/o & w Contrast; Future what you want?    Reviewed OV from 3/29/18.  TA Jansen

## 2018-07-11 NOTE — TELEPHONE ENCOUNTER
The patient is scheduled for an MRI 7/12 at [facility].  [facility] called to confirm that the MRI was ordered correctly and that PCP does want MRI of abdomen.

## 2018-07-11 NOTE — TELEPHONE ENCOUNTER
Dr. Herrera is deferring to pcp.  I left a note for pcp to address at 0800 on 7/12/18.  TA Jansen

## 2018-07-11 NOTE — TELEPHONE ENCOUNTER
"Anca called again from [facility]. Pt has MRI scheduled tomorrow 7/12 @ 11am and US scheduled at 12pm. MRI is currently schedule for abdomen w & w/o contrast.     Dr. Curtis wrote on 3/29/2018, \"2. Confusion  Etiology unclear  Acute  Recurrent episodes  Recurrent stroke vs hypoglycemia vs other (poor nutrition, B12 deficiency, psychiatric, dehydration) which seem less likely  See #1 above  I will also evaluate for carotid stenosis, see order  Repeat MRI/MRA, last done over 2 years ago     I discussed CV risk factors in detail with patient and her daughter to try to convince them that excessively tight blood glucose control might actually be harming Reanna, at end of discussion daughter did seem to understand.  Note that other risk factors are well controlled (BP, LDL)     - Hemoglobin A1c  - MR Abdomen w/o & w Contrast; Future  - Glucose whole blood  - US Carotid Bilateral; Future     3. History of TIA (transient ischemic attack) and stroke  See above  - MR Abdomen w/o & w Contrast; Future  - Glucose whole blood  - US Carotid Bilateral; Future\"    Patient had MRI/MR angiogram and brain done in 2015. MRI tech is worried order is supposed to be for MRI of brain instead of abdomen and is calling for clarification.    Imani sU RN, BSN   "

## 2018-07-11 NOTE — TELEPHONE ENCOUNTER
Anca called again from MRI seeking clarification as the patient is scheduled for MRI 7/12/18 at 0800.  Dr. Herrera is this something you can address as doc of the day?

## 2018-07-12 ENCOUNTER — HOSPITAL ENCOUNTER (OUTPATIENT)
Dept: MRI IMAGING | Facility: CLINIC | Age: 83
Discharge: HOME | End: 2018-07-12
Attending: FAMILY MEDICINE | Admitting: FAMILY MEDICINE
Payer: MEDICARE

## 2018-07-12 ENCOUNTER — HOSPITAL ENCOUNTER (OUTPATIENT)
Dept: ULTRASOUND IMAGING | Facility: CLINIC | Age: 83
Discharge: HOME | End: 2018-07-12
Attending: FAMILY MEDICINE | Admitting: FAMILY MEDICINE
Payer: MEDICARE

## 2018-07-12 DIAGNOSIS — Z86.73 HISTORY OF TIA (TRANSIENT ISCHEMIC ATTACK) AND STROKE: ICD-10-CM

## 2018-07-12 DIAGNOSIS — R41.0 DISORIENTATION: ICD-10-CM

## 2018-07-12 DIAGNOSIS — G45.8 OTHER TRANSIENT CEREBRAL ISCHEMIC ATTACKS AND RELATED SYNDROMES: ICD-10-CM

## 2018-07-12 DIAGNOSIS — R41.0 CONFUSION: ICD-10-CM

## 2018-07-12 PROCEDURE — 70553 MRI BRAIN STEM W/O & W/DYE: CPT

## 2018-07-12 PROCEDURE — A9585 GADOBUTROL INJECTION: HCPCS | Performed by: RADIOLOGY

## 2018-07-12 PROCEDURE — 25000128 H RX IP 250 OP 636: Performed by: RADIOLOGY

## 2018-07-12 PROCEDURE — 70544 MR ANGIOGRAPHY HEAD W/O DYE: CPT | Mod: XS

## 2018-07-12 PROCEDURE — 93880 EXTRACRANIAL BILAT STUDY: CPT

## 2018-07-12 PROCEDURE — 70549 MR ANGIOGRAPH NECK W/O&W/DYE: CPT

## 2018-07-12 RX ORDER — GADOBUTROL 604.72 MG/ML
10 INJECTION INTRAVENOUS ONCE
Status: COMPLETED | OUTPATIENT
Start: 2018-07-12 | End: 2018-07-12

## 2018-07-12 RX ADMIN — GADOBUTROL 10 ML: 604.72 INJECTION INTRAVENOUS at 11:50

## 2018-07-12 NOTE — TELEPHONE ENCOUNTER
ASSESSMENT / PLAN:  (R41.0) Disorientation  (primary encounter diagnosis)  (R41.0) Confusion  (Z86.73) History of TIA (transient ischemic attack) and stroke  (G45.8) Other transient cerebral ischemic attacks and related syndromes       Tech is correct, I wanted brain/carotid artery evaluation, see new order, please cancel abdominal MRI  Plan: MR Brain COW Carotid w/o Contrast

## 2018-08-07 ENCOUNTER — OFFICE VISIT (OUTPATIENT)
Dept: FAMILY MEDICINE | Facility: CLINIC | Age: 83
End: 2018-08-07
Payer: COMMERCIAL

## 2018-08-07 VITALS
SYSTOLIC BLOOD PRESSURE: 108 MMHG | OXYGEN SATURATION: 94 % | BODY MASS INDEX: 28.6 KG/M2 | DIASTOLIC BLOOD PRESSURE: 67 MMHG | HEIGHT: 61 IN | HEART RATE: 64 BPM | TEMPERATURE: 97.7 F | WEIGHT: 151.5 LBS

## 2018-08-07 DIAGNOSIS — F01.50 VASCULAR DEMENTIA WITHOUT BEHAVIORAL DISTURBANCE (H): Primary | ICD-10-CM

## 2018-08-07 DIAGNOSIS — E11.9 TYPE 2 DIABETES MELLITUS WITHOUT COMPLICATION, WITHOUT LONG-TERM CURRENT USE OF INSULIN (H): ICD-10-CM

## 2018-08-07 DIAGNOSIS — E87.6 HYPOKALEMIA: ICD-10-CM

## 2018-08-07 DIAGNOSIS — E78.5 HYPERLIPIDEMIA LDL GOAL <100: ICD-10-CM

## 2018-08-07 PROBLEM — R41.0 DISORIENTATION: Status: RESOLVED | Noted: 2018-07-12 | Resolved: 2018-08-07

## 2018-08-07 PROCEDURE — 36415 COLL VENOUS BLD VENIPUNCTURE: CPT | Performed by: FAMILY MEDICINE

## 2018-08-07 PROCEDURE — 80048 BASIC METABOLIC PNL TOTAL CA: CPT | Performed by: FAMILY MEDICINE

## 2018-08-07 PROCEDURE — 99214 OFFICE O/P EST MOD 30 MIN: CPT | Performed by: FAMILY MEDICINE

## 2018-08-07 RX ORDER — GLIPIZIDE 5 MG/1
TABLET ORAL
Qty: 45 TABLET | Refills: 3 | Status: SHIPPED | OUTPATIENT
Start: 2018-08-07 | End: 2018-11-06

## 2018-08-07 NOTE — PROGRESS NOTES
"  SUBJECTIVE:   Reanna Garza is a 89 year old female who presents to clinic today for the following health issues:  {Provider please address medication reconciliation discrepancies--rooming staff please delete if no med/rec issues}    {ACUTE Problem  - SuperBrief histories:437540}    {additional problems for provider to add:343210}    Problem list and histories reviewed & adjusted, as indicated.  Additional history: {NONE - AS DOCUMENTED:896413::\"as documented\"}    {HIST REVIEW/ LINKS 2:154068}    Reviewed and updated as needed this visit by clinical staff       Reviewed and updated as needed this visit by Provider         {PROVIDER CHARTING PREFERENCE:709777}  "

## 2018-08-07 NOTE — PROGRESS NOTES
SUBJECTIVE:   Reanna Garza is a 89 year old female who presents to clinic today for the following health issues:    Vascular dementia, Acute confusion with history of stroke  Her daughters have noticed episodes of increased confusion similar to symptoms when Reanna has a stroke 3 years ago, some disorientation, without weakness, slurred speech, falls. She did have some slurred speech in January of this year. Her daughter accompanies her today and provides significant history. Her daughter feels she can tell the difference between confusion related to stroke vs hypoglycemia, and she's also very concerned that Reanna's A1C be less than 6.3 because that was the A1C level when Reanna had her stroke.  Normally Reanna is well orientated to day, season, president, etc., has had increasing trouble.  She has not gotten lost, wandered from her home, or done anything potentially dangerous such as leave a stove on. She lives in her own own with her  and a daughter.    Diabetes Follow-up      Patient is checking blood sugars: every day -bid    Morning 100's - 110's, afternoon 188 - 244    Diabetic concerns: None and other - confusion     Symptoms of hypoglycemia (low blood sugar): none     Paresthesias (numbness or burning in feet) or sores: No     Date of last diabetic eye exam: 03/2018  Her glibizide was recently discontinued, but her daughters have noted she's not sleeping as well and are concerned this might be blood sugar related.         BP Readings from Last 2 Encounters:   01/09/18 130/73   10/11/17 160/87     Hemoglobin A1C (%)   Date Value   01/09/2018 6.3 (H)   10/11/2017 5.7     LDL Cholesterol Calculated (mg/dL)   Date Value   01/09/2018 87   12/13/2016 105 (H)       Amount of exercise or physical activity: around the house    Problems taking medications regularly: No    Medication side effects: none    Diet: regular (no restrictions)    BP Readings from Last 3 Encounters:   03/29/18 143/74    01/09/18 130/73   10/11/17 160/87      Hyperlipidemia Follow-Up      Rate your low fat/cholesterol diet?: fair    Taking statin?  Yes, no muscle aches from statin    Other lipid medications/supplements?:  none      Problem list and histories reviewed & adjusted, as indicated.  Additional history: as documented    Patient Active Problem List   Diagnosis     Abdominal aortic aneurysm (H)     Restless legs syndrome (RLS)     Abnormality of gait     Imbalance     HYPERLIPIDEMIA LDL GOAL <100     Low back pain     Lumbar spondylosis     Right hip pain     Trochanteric bursitis     Hypertension goal BP (blood pressure) < 140/90     Shoulder pain     Disturbance of skin sensation     Macular degeneration (senile) of retina     Chronic rhinitis     Stroke (H)     Vitamin D deficiency     Advanced care planning/counseling discussion     Type 2 diabetes mellitus without complication (H)     Past Surgical History:   Procedure Laterality Date     BLEPHAROPLASTY, BROW LIFT, COMBINED  6/10/2013    Procedure: COMBINED BLEPHAROPLASTY, BROW LIFT;  BILATERAL BLEPHAROPLASTY WITH INTERNAL BROW LIFT;  Surgeon: Chip Dai MD;  Location:  [facility]     C CHOLECYSTOENTEROSTOMY      thaddeus     C TOTAL ABDOM HYSTERECTOMY       C TOTAL KNEE ARTHROPLASTY  6/04    x2     EXTRACAPSULAR CATARACT EXTRATION WITH INTRAOCULAR LENS IMPLANT      both eyes     HC SHOULDER ARTHROSCOPY, DX  12/05    rotator cuff tear repair     REPLACEMENT SOCKET, SHOULDER DISARTICULATION/INTERSCAPULAR THORACIC, MOLDED TO  6/07    right       Social History   Substance Use Topics     Smoking status: Former Smoker     Quit date: 11/7/1999     Smokeless tobacco: Never Used     Alcohol use Yes      Comment: rare     Family History   Problem Relation Age of Onset     CANCER Brother      CANCER Sister      Breast Cancer Sister      CANCER Sister      CANCER Sister      CANCER Sister      Alzheimer Disease Sister          Current Outpatient Prescriptions   Medication Sig  Dispense Refill     glipiZIDE (GLUCOTROL) 5 MG tablet Take 1/2 pill every day. 45 tablet 0     blood glucose monitoring (ACCU-CHEK PATTI PLUS) test strip Use to test blood sugar 4 times daily or as directed. 1 Box 1     lovastatin (MEVACOR) 40 MG tablet Take 1 tablet (40 mg) by mouth At Bedtime 90 tablet 3     hydrochlorothiazide (MICROZIDE) 12.5 MG capsule TAKE 1 CAPSULE(12.5 MG) BY MOUTH DAILY 90 capsule 3     losartan (COZAAR) 50 MG tablet Take 1 tablet (50 mg) by mouth daily 90 tablet 3     blood glucose monitoring (ACCU-CHEK PATTI PLUS) meter device kit Use to test blood sugars 4 times daily or as directed. 1 kit 0     blood glucose monitoring (ACCU-CHEK MULTICLIX) lancets Use to test blood sugar 2 times daily or as directed. Compatible with glucometer. 2 Box 0     Ibuprofen (ADVIL PO)        ACCU-CHEK PATTI PLUS test strip        Blood Glucose Monitoring Suppl (ACCU-CHEK PATTI) DEANNA 1 Device daily       blood glucose test strip Use to test blood sugar 2 times daily or as directed. 1 Box prn     Cholecalciferol (VITAMIN D) 2000 UNITS tablet Take 4,000 Units by mouth daily 100 tablet 3     ARTIFICIAL TEARS 0.1-0.3 % SOLN Apply to eye every 6 hours as needed       calcium citrate-vitamin D (CALCIUM + D) 315-200 MG-UNIT TABS Take 2 tablets by mouth daily 60 tablet      acetaminophen 650 MG TABS Take 650 mg by mouth every 4 hours as needed for mild pain or fever 100 tablet      aspirin  MG EC tablet Take 1 tablet (162 mg) by mouth daily       Allergies   Allergen Reactions     Codeine      Hives       Codeine Sulfate Hives     Penicillins Hives     Strawberry Hives     Recent Labs   Lab Test  03/29/18   1339  01/09/18   1452  10/11/17   1410  12/13/16   1057  12/13/16   1056  06/08/16   1333   05/26/15   1055   A1C  5.7  6.3*  5.7   --   6.0  5.9   < >  5.9   LDL   --   87   --   105*   --   97   < >   --    HDL   --   32*   --   38*   --   33*   < >   --    TRIG   --   284*   --   192*   --   210*   < >   --  "   ALT   --   19  18   --   16  20   < >   --    CR   --   0.70  0.79   --   0.90  1.00   < >  0.71   GFRESTIMATED   --   78  69   --   59*  53*   < >  78   GFRESTBLACK   --   >90  83   --   71  64   < >  >90   GFR Calc     POTASSIUM   --   3.4  3.5   --   3.7  3.7   < >  3.0*   TSH   --    --   2.29   --    --    --    --   1.29    < > = values in this interval not displayed.      BP Readings from Last 3 Encounters:   03/29/18 143/74   01/09/18 130/73   10/11/17 160/87    Wt Readings from Last 3 Encounters:   03/29/18 160 lb (72.6 kg)   01/09/18 158 lb 8 oz (71.9 kg)   10/11/17 158 lb 8 oz (71.9 kg)                    Reviewed and updated as needed this visit by clinical staff       Reviewed and updated as needed this visit by Provider         ROS:  Constitutional, HEENT, cardiovascular, pulmonary, GI, , musculoskeletal, neuro, skin, endocrine and psych systems are negative, except as otherwise noted.    OBJECTIVE:     /67  Pulse 64  Temp 97.7  F (36.5  C) (Oral)  Ht 5' 1\" (1.549 m)  Wt 151 lb 8 oz (68.7 kg)  SpO2 94%  BMI 28.63 kg/m2   GENERAL: healthy, alert and no distress, smiling  NECK: no adenopathy, no asymmetry, masses, or scars, thyroid normal to palpation and no carotid bruits  RESP: lungs clear to auscultation - no rales, rhonchi or wheezes  CV: regular rate and rhythm, normal S1 S2, no S3 or S4, no murmur, click or rub, no peripheral edema and peripheral pulses strong  Neuro:  Pupils equal, round, reactive to light.    Gait: she ade from a chair with some difficulty and has a mildly broad-based gait, uses 4- legged walker      ASSESSMENT/PLAN:     1. Type 2 diabetes mellitus without complication, without long-term current use of insulin (H)  Well controlled, I will add back glipizide to see if this improves sleep, continue to monitor fsbg bid to monitor for hypoglycemia    2. Vascular dementia  I reviewed MRI/MRA imaging, she has mild bilateral < 50% carotid stenosis, and " left P2 moderate-severe stenosis, unchanged from 2015.  I discussed CV risk factors in detail with patient and her daughter.  Note that risk factors are well controlled (BP, LDL)    3. Hyperlipidemia  LDL Cholesterol Calculated   Date Value Ref Range Status   06/27/2018 58 <100 mg/dL Final     Comment:     Desirable:       <100 mg/dl    at goal on statin    4. Hypokalemia  Noted prior visit, will recheck bmp today      Chelita Curtis MD  [facility]

## 2018-08-07 NOTE — MR AVS SNAPSHOT
"              After Visit Summary   8/7/2018    Reanna Garza    MRN: [id number]           Patient Information     Date Of Birth          12/25/1927        Visit Information        Provider Department      8/7/2018 10:40 AM Chelita Curtis MD [facility]        Today's Diagnoses     Vascular dementia without behavioral disturbance    -  1    Type 2 diabetes mellitus without complication, without long-term current use of insulin (H)        Hyperlipidemia LDL goal <100          Care Instructions    Please check that the Health care power of , advanced care directive are set up.          Follow-ups after your visit        Who to contact     If you have questions or need follow up information about today's clinic visit or your schedule please contact [facility] directly at [phone].  Normal or non-critical lab and imaging results will be communicated to you by MyChart, letter or phone within 4 business days after the clinic has received the results. If you do not hear from us within 7 days, please contact the clinic through MyChart or phone. If you have a critical or abnormal lab result, we will notify you by phone as soon as possible.  Submit refill requests through [employer] or call your pharmacy and they will forward the refill request to us. Please allow 3 business days for your refill to be completed.          Additional Information About Your Visit        Care EveryWhere ID     This is your Care EveryWhere ID. This could be used by other organizations to access your [facility] medical records  [id number]        Your Vitals Were     Pulse Temperature Height Pulse Oximetry BMI (Body Mass Index)       64 97.7  F (36.5  C) (Oral) 5' 1\" (1.549 m) 94% 28.63 kg/m2        Blood Pressure from Last 3 Encounters:   08/07/18 108/67   06/27/18 129/71   03/29/18 143/74    Weight from Last 3 Encounters:   08/07/18 151 lb 8 oz (68.7 kg)   06/27/18 154 lb (69.9 kg)   03/29/18 160 " lb (72.6 kg)              Today, you had the following     No orders found for display         Today's Medication Changes          These changes are accurate as of 8/7/18 11:03 AM.  If you have any questions, ask your nurse or doctor.               Start taking these medicines.        Dose/Directions    glipiZIDE 5 MG tablet   Commonly known as:  GLUCOTROL   Used for:  Type 2 diabetes mellitus without complication, without long-term current use of insulin (H)   Started by:  Chelita Curtis MD        Take 1/2 pill every day.   Quantity:  45 tablet   Refills:  3            Where to get your medicines      These medications were sent to [id number] - [address] [facility] [address]     Phone:  [phone]     blood glucose monitoring lancets    glipiZIDE 5 MG tablet                Primary Care Provider Office Phone # Fax #    Chelita Curtis [phone] [address]  [facility] [address]        Equal Access to Services     [facility]: Hadii aad ku hadasho Soomaali, waaxda luqadaha, qaybta kaalmada adeegyada, waxay tabatha hayyolis wren . So [facility] [phone].    ATENCIÓN: Si habla español, tiene a mejia disposición servicios gratuitos de asistencia lingüística. Llame al [phone].    We comply with applicable federal civil rights laws and [place] laws. We do not discriminate on the basis of race, color, national origin, age, disability, sex, sexual orientation, or gender identity.            Thank you!     Thank you for choosing [facility]  for your care. Our goal is always to provide you with excellent care. Hearing back from our patients is one way we can continue to improve our services. Please take a few minutes to complete the written survey that you may receive in the mail after your visit with us. Thank you!             Your Updated Medication  List - Protect others around you: Learn how to safely use, store and throw away your medicines at www.disposemymeds.org.          This list is accurate as of 8/7/18 11:03 AM.  Always use your most recent med list.                   Brand Name Dispense Instructions for use Diagnosis    * ACCU-CHEK PATTI Aliya      1 Device daily    Type 2 diabetes, HbA1C goal < 8% (H)       * blood glucose monitoring meter device kit     1 kit    Use to test blood sugars 4 times daily or as directed.    Type 2 diabetes mellitus without complication (H)       acetaminophen 650 MG 8 hour tablet     100 tablet    Take 650 mg by mouth every 4 hours as needed for mild pain or fever    Stroke (H)       ADVIL PO           aspirin 162 MG EC tablet      Take 1 tablet (162 mg) by mouth daily    Stroke (H)       blood glucose monitoring lancets     204 each    Use to test blood sugar 2 times daily or as directed. Compatible with glucometer.    Type 2 diabetes mellitus without complication, without long-term current use of insulin (H)       * blood glucose monitoring test strip    no brand specified    1 Box    Use to test blood sugar 2 times daily or as directed.    Type 2 diabetes, HbA1C goal < 8% (H)       * ACCU-CHEK PATTI PLUS test strip   Generic drug:  blood glucose monitoring           * blood glucose monitoring test strip    ACCU-CHEK PATTI PLUS    1 Box    Use to test blood sugar 4 times daily or as directed.    Type 2 diabetes, HbA1C goal < 8% (H)       calcium citrate-vitamin D 315-200 MG-UNIT Tabs per tablet    CALCIUM + D    60 tablet    Take 2 tablets by mouth daily    Routine general medical examination at a health care facility       glipiZIDE 5 MG tablet    GLUCOTROL    45 tablet    Take 1/2 pill every day.    Type 2 diabetes mellitus without complication, without long-term current use of insulin (H)       hydrochlorothiazide 12.5 MG capsule    MICROZIDE    90 capsule    TAKE 1 CAPSULE(12.5 MG) BY MOUTH DAILY    Hypertension  goal BP (blood pressure) < 140/90       hypromellose-dextran 0.3-0.1% 0.1-0.3 % Soln ophthalmic solution   Generic drug:  hypromellose-dextran      Apply to eye every 6 hours as needed        losartan 50 MG tablet    COZAAR    90 tablet    Take 1 tablet (50 mg) by mouth daily    Hypertension goal BP (blood pressure) < 140/90       lovastatin 40 MG tablet    MEVACOR    90 tablet    Take 1 tablet (40 mg) by mouth At Bedtime    Hyperlipidemia LDL goal <100       vitamin D 2000 units tablet     100 tablet    Take 4,000 Units by mouth daily    Vitamin D deficiency       * Notice:  This list has 5 medication(s) that are the same as other medications prescribed for you. Read the directions carefully, and ask your doctor or other care provider to review them with you.

## 2018-08-07 NOTE — LETTER
August 9, 2018      Reanna Garza  [address] [place] [id number]        Dear ,    We are writing to inform you of your test results.    Your glucose level, electrolyte level and kidney function, measured with a test called the basic metabolic panel, is normal, which is great news!  Your potassium is now normal!    If you have any questions, please contact the clinic or schedule an appointment with me, thank you!    Resulted Orders   Basic metabolic panel   Result Value Ref Range    Sodium 144 133 - 144 mmol/L    Potassium 3.4 3.4 - 5.3 mmol/L    Chloride 105 94 - 109 mmol/L    Carbon Dioxide 31 20 - 32 mmol/L    Anion Gap 8 3 - 14 mmol/L    Glucose 107 (H) 70 - 99 mg/dL      Comment:      Fasting specimen    Urea Nitrogen 23 7 - 30 mg/dL    Creatinine 0.89 0.52 - 1.04 mg/dL    GFR Estimate 59 (L) >60 mL/min/1.7m2      Comment:      Non  GFR Calc    GFR Estimate If Black 72 >60 mL/min/1.7m2      Comment:       GFR Calc    Calcium 9.3 8.5 - 10.1 mg/dL     If you have any questions or concerns, please call the clinic at the number listed above.   Sincerely,  Chelita Curtis MD/nr

## 2018-08-08 LAB
ANION GAP SERPL CALCULATED.3IONS-SCNC: 8 MMOL/L (ref 3–14)
BUN SERPL-MCNC: 23 MG/DL (ref 7–30)
CALCIUM SERPL-MCNC: 9.3 MG/DL (ref 8.5–10.1)
CHLORIDE SERPL-SCNC: 105 MMOL/L (ref 94–109)
CO2 SERPL-SCNC: 31 MMOL/L (ref 20–32)
CREAT SERPL-MCNC: 0.89 MG/DL (ref 0.52–1.04)
GFR SERPL CREATININE-BSD FRML MDRD: 59 ML/MIN/1.7M2
GLUCOSE SERPL-MCNC: 107 MG/DL (ref 70–99)
POTASSIUM SERPL-SCNC: 3.4 MMOL/L (ref 3.4–5.3)
SODIUM SERPL-SCNC: 144 MMOL/L (ref 133–144)

## 2018-08-08 NOTE — PROGRESS NOTES
Your glucose level, electrolyte level and kidney function, measured with a test called the basic metabolic panel, is normal, which is great news!  Your potassium is now normal!    If you have any questions, please contact the clinic or schedule an appointment with me, thank you!    Sincerely,    TAYLOR MONTEZ MD   8/8/2018

## 2018-08-20 DIAGNOSIS — E78.5 HYPERLIPIDEMIA LDL GOAL <100: ICD-10-CM

## 2018-08-20 NOTE — TELEPHONE ENCOUNTER
"Requested Prescriptions   Pending Prescriptions Disp Refills     lovastatin (MEVACOR) 40 MG tablet [Pharmacy Med Name: LOVASTATIN 40MG TABLETS]  Last Written Prescription Date:  10/11/2017  Last Fill Quantity: 90 tablet,  # refills: 3   Last Office Visit: 8/7/2018   Future Office Visit:      90 tablet 0     Sig: TAKE 1 TABLET(40 MG) BY MOUTH AT BEDTIME    Statins Protocol Passed    8/20/2018  3:57 AM       Passed - LDL on file in past 12 months    Recent Labs   Lab Test  06/27/18   1051   LDL  58          Passed - No abnormal creatine kinase in past 12 months    No lab results found.          Passed - Recent (12 mo) or future (30 days) visit within the authorizing provider's specialty    Patient had office visit in the last 12 months or has a visit in the next 30 days with authorizing provider or within the authorizing provider's specialty.  See \"Patient Info\" tab in inbasket, or \"Choose Columns\" in Meds & Orders section of the refill encounter.           Passed - Patient is age 18 or older       Passed - No active pregnancy on record       Passed - No positive pregnancy test in past 12 months          "

## 2018-08-22 RX ORDER — LOVASTATIN 40 MG
TABLET ORAL
Qty: 0.01 TABLET | Refills: 0 | OUTPATIENT
Start: 2018-08-22

## 2018-08-22 NOTE — TELEPHONE ENCOUNTER
Duplicate    Refused Prescriptions:                       Disp   Refills    lovastatin (MEVACOR) 40 MG tablet [Pharmac*0.01 t*0        Sig: TAKE 1 TABLET(40 MG) BY MOUTH AT BEDTIME  Refused By: JUDI MOBLEY  Reason for Refusal: Patient has requested refill too soon      Closing encounter - no further actions needed at this time    Judi Mobley RN

## 2018-08-28 DIAGNOSIS — I10 HYPERTENSION GOAL BP (BLOOD PRESSURE) < 140/90: ICD-10-CM

## 2018-08-28 NOTE — TELEPHONE ENCOUNTER
"Requested Prescriptions   Pending Prescriptions Disp Refills     losartan (COZAAR) 50 MG tablet [Pharmacy Med Name: LOSARTAN 50MG TABLETS]  Last Written Prescription Date:  10/11/2017  Last Fill Quantity: 90 tablet,  # refills: 3   Last Office Visit: 8/7/2018   Future Office Visit:      90 tablet 0     Sig: TAKE 1 TABLET(50 MG) BY MOUTH DAILY    Angiotensin-II Receptors Passed    8/28/2018  1:13 PM       Passed - Blood pressure under 140/90 in past 12 months    BP Readings from Last 3 Encounters:   08/07/18 108/67   06/27/18 129/71   03/29/18 143/74          Passed - Recent (12 mo) or future (30 days) visit within the authorizing provider's specialty    Patient had office visit in the last 12 months or has a visit in the next 30 days with authorizing provider or within the authorizing provider's specialty.  See \"Patient Info\" tab in inbasket, or \"Choose Columns\" in Meds & Orders section of the refill encounter.           Passed - Patient is age 18 or older       Passed - No active pregnancy on record       Passed - Normal serum creatinine on file in past 12 months    Recent Labs   Lab Test  08/07/18   1107   CR  0.89          Passed - Normal serum potassium on file in past 12 months    Recent Labs   Lab Test  08/07/18   1107   POTASSIUM  3.4           Passed - No positive pregnancy test in past 12 months          "

## 2018-08-29 RX ORDER — LOSARTAN POTASSIUM 50 MG/1
TABLET ORAL
Qty: 90 TABLET | Refills: 0 | OUTPATIENT
Start: 2018-08-29

## 2018-10-01 DIAGNOSIS — I10 HYPERTENSION GOAL BP (BLOOD PRESSURE) < 140/90: ICD-10-CM

## 2018-10-01 NOTE — TELEPHONE ENCOUNTER
"Requested Prescriptions   Pending Prescriptions Disp Refills     losartan (COZAAR) 50 MG tablet [Pharmacy Med Name: LOSARTAN 50MG TABLETS]  Last Written Prescription Date:  10/11/2017  Last Fill Quantity: 90 tablet,  # refills: 3   Last Office Visit: 8/7/2018   Future Office Visit:      90 tablet 0     Sig: TAKE 1 TABLET(50 MG) BY MOUTH DAILY    Angiotensin-II Receptors Passed    10/1/2018 12:31 PM       Passed - Blood pressure under 140/90 in past 12 months    BP Readings from Last 3 Encounters:   08/07/18 108/67   06/27/18 129/71   03/29/18 143/74          Passed - Recent (12 mo) or future (30 days) visit within the authorizing provider's specialty    Patient had office visit in the last 12 months or has a visit in the next 30 days with authorizing provider or within the authorizing provider's specialty.  See \"Patient Info\" tab in inbasket, or \"Choose Columns\" in Meds & Orders section of the refill encounter.           Passed - Patient is age 18 or older       Passed - No active pregnancy on record       Passed - Normal serum creatinine on file in past 12 months    Recent Labs   Lab Test  08/07/18   1107   CR  0.89          Passed - Normal serum potassium on file in past 12 months    Recent Labs   Lab Test  08/07/18   1107   POTASSIUM  3.4           Passed - No positive pregnancy test in past 12 months     _________________________________________________________________________________________________________________________                                                                                                "

## 2018-10-03 RX ORDER — LOSARTAN POTASSIUM 50 MG/1
TABLET ORAL
Qty: 90 TABLET | Refills: 2 | Status: SHIPPED | OUTPATIENT
Start: 2018-10-03 | End: 2019-05-16

## 2018-10-03 RX ORDER — LOSARTAN POTASSIUM 50 MG/1
TABLET ORAL
Qty: 90 TABLET | Refills: 0 | OUTPATIENT
Start: 2018-10-03

## 2018-10-03 NOTE — TELEPHONE ENCOUNTER
Prescription approved per [facility] Refill Protocol.  JAMAR Carlson, BSN, RN

## 2018-10-16 ENCOUNTER — TRANSFERRED RECORDS (OUTPATIENT)
Dept: HEALTH INFORMATION MANAGEMENT | Facility: CLINIC | Age: 83
End: 2018-10-16

## 2018-11-02 DIAGNOSIS — E11.9 TYPE 2 DIABETES, HBA1C GOAL < 8% (H): ICD-10-CM

## 2018-11-02 RX ORDER — BLOOD SUGAR DIAGNOSTIC
STRIP MISCELLANEOUS
Qty: 400 STRIP | Refills: 0 | Status: SHIPPED | OUTPATIENT
Start: 2018-11-02 | End: 2019-02-22

## 2018-11-02 NOTE — TELEPHONE ENCOUNTER
"Requested Prescriptions   Pending Prescriptions Disp Refills     ACCU-CHEK PATTI PLUS test strip [Pharmacy Med Name: ACCU-CHEK PATTI PLUS STRIPS 100'S] 300 strip 0     Sig: USE AS DIRECTED TO TEST BLOOD SUGAR FOUR TIMES DAILY OR AS DIRECTED    Diabetic Supplies Protocol Passed    11/2/2018  1:21 PM       Passed - Patient is 18 years of age or older       Passed - Recent (6 mo) or future (30 days) visit within the authorizing provider's specialty    Patient had office visit in the last 6 months or has a visit in the next 30 days with authorizing provider.  See \"Patient Info\" tab in inbasket, or \"Choose Columns\" in Meds & Orders section of the refill encounter.            Prescription approved per [facility] Refill Protocol.    Signed Prescriptions:                        Disp   Refills    ACCU-CHEK PATTI PLUS test strip            400 st*0        Sig: USE AS DIRECTED TO TEST BLOOD SUGAR FOUR TIMES DAILY           OR AS DIRECTED  Authorizing Provider: NAGA ASH  Ordering User: JUDI MOBLEY      Closing encounter - no further actions needed at this time    Judi Mobley RN    "

## 2018-11-06 ENCOUNTER — OFFICE VISIT (OUTPATIENT)
Dept: FAMILY MEDICINE | Facility: CLINIC | Age: 83
End: 2018-11-06
Payer: COMMERCIAL

## 2018-11-06 VITALS
HEIGHT: 61 IN | HEART RATE: 68 BPM | OXYGEN SATURATION: 95 % | SYSTOLIC BLOOD PRESSURE: 118 MMHG | BODY MASS INDEX: 29.12 KG/M2 | DIASTOLIC BLOOD PRESSURE: 62 MMHG | TEMPERATURE: 98.6 F | WEIGHT: 154.25 LBS

## 2018-11-06 DIAGNOSIS — R41.3 MEMORY CHANGE: Primary | ICD-10-CM

## 2018-11-06 DIAGNOSIS — Z23 NEED FOR PROPHYLACTIC VACCINATION AND INOCULATION AGAINST INFLUENZA: ICD-10-CM

## 2018-11-06 DIAGNOSIS — E78.5 HYPERLIPIDEMIA LDL GOAL <100: ICD-10-CM

## 2018-11-06 DIAGNOSIS — I10 HYPERTENSION GOAL BP (BLOOD PRESSURE) < 140/90: ICD-10-CM

## 2018-11-06 DIAGNOSIS — E11.9 TYPE 2 DIABETES MELLITUS WITHOUT COMPLICATION, WITHOUT LONG-TERM CURRENT USE OF INSULIN (H): ICD-10-CM

## 2018-11-06 LAB
ERYTHROCYTE [DISTWIDTH] IN BLOOD BY AUTOMATED COUNT: 13.5 % (ref 10–15)
HBA1C MFR BLD: 5.7 % (ref 0–5.6)
HCT VFR BLD AUTO: 38 % (ref 35–47)
HGB BLD-MCNC: 12.5 G/DL (ref 11.7–15.7)
MCH RBC QN AUTO: 29.3 PG (ref 26.5–33)
MCHC RBC AUTO-ENTMCNC: 32.9 G/DL (ref 31.5–36.5)
MCV RBC AUTO: 89 FL (ref 78–100)
PLATELET # BLD AUTO: 185 10E9/L (ref 150–450)
RBC # BLD AUTO: 4.26 10E12/L (ref 3.8–5.2)
WBC # BLD AUTO: 6.7 10E9/L (ref 4–11)

## 2018-11-06 PROCEDURE — 80061 LIPID PANEL: CPT | Performed by: FAMILY MEDICINE

## 2018-11-06 PROCEDURE — G0008 ADMIN INFLUENZA VIRUS VAC: HCPCS | Performed by: FAMILY MEDICINE

## 2018-11-06 PROCEDURE — 85027 COMPLETE CBC AUTOMATED: CPT | Performed by: FAMILY MEDICINE

## 2018-11-06 PROCEDURE — 83036 HEMOGLOBIN GLYCOSYLATED A1C: CPT | Performed by: FAMILY MEDICINE

## 2018-11-06 PROCEDURE — 99214 OFFICE O/P EST MOD 30 MIN: CPT | Mod: 25 | Performed by: FAMILY MEDICINE

## 2018-11-06 PROCEDURE — 90662 IIV NO PRSV INCREASED AG IM: CPT | Performed by: FAMILY MEDICINE

## 2018-11-06 PROCEDURE — 80053 COMPREHEN METABOLIC PANEL: CPT | Performed by: FAMILY MEDICINE

## 2018-11-06 PROCEDURE — 36415 COLL VENOUS BLD VENIPUNCTURE: CPT | Performed by: FAMILY MEDICINE

## 2018-11-06 RX ORDER — LOVASTATIN 40 MG
40 TABLET ORAL AT BEDTIME
Qty: 90 TABLET | Refills: 3 | Status: SHIPPED | OUTPATIENT
Start: 2018-11-06 | End: 2020-01-01

## 2018-11-06 RX ORDER — LOSARTAN POTASSIUM 50 MG/1
TABLET ORAL
Qty: 90 TABLET | Refills: 2 | Status: CANCELLED | OUTPATIENT
Start: 2018-11-06

## 2018-11-06 RX ORDER — GLIPIZIDE 5 MG/1
TABLET ORAL
Start: 2018-11-06 | End: 2019-02-22

## 2018-11-06 RX ORDER — GLIPIZIDE 5 MG/1
TABLET ORAL
Qty: 45 TABLET | Refills: 3 | Status: CANCELLED | OUTPATIENT
Start: 2018-11-06

## 2018-11-06 RX ORDER — HYDROCHLOROTHIAZIDE 12.5 MG/1
CAPSULE ORAL
Qty: 90 CAPSULE | Refills: 3 | Status: CANCELLED | OUTPATIENT
Start: 2018-11-06

## 2018-11-06 NOTE — PROGRESS NOTES
SUBJECTIVE:   Reanna Garza is a 89 year old female who presents to clinic today for the following health issues:      Diabetes Follow-up    Patient is checking blood sugars: once daily.  Results are as follows:         am - fasting 112-115    Diabetic concerns: None     Symptoms of hypoglycemia (low blood sugar): none     Paresthesias (numbness or burning in feet) or sores: No     Date of last diabetic eye exam: every 5 weeks go to eye dr to check eye degeneration     Diabetes Management Resources    Hyperlipidemia Follow-Up      Rate your low fat/cholesterol diet?: good    Taking statin?  Yes, no muscle aches from statin    Other lipid medications/supplements?:  none    Hypertension Follow-up      Outpatient blood pressures are not being checked.    Low Salt Diet: barely     BP Readings from Last 2 Encounters:   08/07/18 108/67   06/27/18 129/71     Hemoglobin A1C (%)   Date Value   11/06/2018 5.7 (H)   03/29/2018 5.7     LDL Cholesterol Calculated (mg/dL)   Date Value   06/27/2018 58   01/09/2018 87       Amount of exercise or physical activity: walking     Problems taking medications regularly: No    Medication side effects: none    Diet: regular (no restrictions)  Additional: pt's daughter states pt has been having confusion mostly with her . She thinks he is somebody else.     Here with her daughter who usually visits with her but she was out for a while and her other daughter who lives with her brought her for her appointment. Per daughter patient is confused. Since this spring - some confusion.   Another sister who lives with patient, takes care of her medications and noticed some days she does not take medications.   Mostly confused with her  and his routine and she sometime does not recognize him and refuses to admits that he is her .   Happens during day time. Not much of evening symptoms.   Daughter is worried that it is from not taking her medications.     Problem list and  histories reviewed & adjusted, as indicated.  Additional history: as documented    Labs reviewed in EPIC    Reviewed and updated as needed this visit by clinical staff    Reviewed and updated as needed this visit by Provider      Social History     Social History     Marital status:      Spouse name: N/A     Number of children: N/A     Years of education: N/A     Occupational History     Not on file.     Social History Main Topics     Smoking status: Former Smoker     Quit date: 11/7/1999     Smokeless tobacco: Never Used     Alcohol use Yes      Comment: rare     Drug use: No     Sexual activity: Yes     Partners: Male     Other Topics Concern     Parent/Sibling W/ Cabg, Mi Or Angioplasty Before 65f 55m? No     Social History Narrative    Cancer Warning Signs:  Patient states she has experienced none    Balanced Diet - Yes    Osteoporosis Prevention Measures - Dairy servings per day: 1-2    Regular Exercise -  No Describe     Dental Exam - YES - Date: 2009    Eye Exam - YES - Date: 2008    Self Breast Exam - Yes    Abuse: Current or Past (Physical, Sexual or Emotional)- No    Do you feel safe in your environment - Yes    Guns stored in the home - No    Sunscreen used - Yes    Seatbelts used - Yes    Lipids -  YES - Date: 6/2008    Glucose -  YES - Date: 6/2008    Colon Cancer Screening - No    Hemoccults - NO    Pap Test -  NO    Do you have any concerns about STD's -  No    Mammography - YES - Date: 7/2008    DEXA - YES - Date: 7/2008    Immunizations reviewed and up to date - Yes Last Td was 11/29/02    Updated 6/2001             Allergies   Allergen Reactions     Codeine      Hives       Codeine Sulfate Hives     Penicillins Hives     Cabazon Hives     Patient Active Problem List   Diagnosis     Abdominal aortic aneurysm (H)     Restless legs syndrome (RLS)     Abnormality of gait     Imbalance     HYPERLIPIDEMIA LDL GOAL <100     Low back pain     Lumbar spondylosis     Right hip pain     Trochanteric  "bursitis     Hypertension goal BP (blood pressure) < 140/90     Shoulder pain     Disturbance of skin sensation     Macular degeneration (senile) of retina     Chronic rhinitis     Stroke (H)     Vitamin D deficiency     Advanced care planning/counseling discussion     Type 2 diabetes mellitus without complication (H)     Confusion     History of TIA (transient ischemic attack) and stroke     Vascular dementia without behavioral disturbance     Hypokalemia     Reviewed medications, social history and  past medical and surgical history.    Review of system: for general, respiratory, CVS, GI and psychiatry negative except for noted above.     EXAM:  /62 (BP Location: Right arm, Patient Position: Sitting, Cuff Size: Adult Regular)  Pulse 68  Temp 98.6  F (37  C) (Oral)  Ht 5' 1\" (1.549 m)  Wt 154 lb 4 oz (70 kg)  SpO2 95%  BMI 29.15 kg/m2  Constitutional:  , alert and no distress   Psychiatric: mildly confused but pleasant and talkative. Answers questions appropriately. Denies any concerns about her memory.   JOINT/EXTREMITIES: using walker.     ASSESSMENT / PLAN:   (R41.3) Memory change  (primary encounter diagnosis)  Comment: Multiple possibilities.  Please see Dr. Curtis and Dr. Herrera's note.  I reviewed her MRI results and blood test results.  Daughter who brought her here was not aware of her clinic appointment as she was not present.  We discussed about possibility of dementia and multiple etiologies for that.  Daughter still thinks it is because of her high blood sugar and I disagree with it.  I actually worry about low blood sugar as I have mentioned before.  See below.  They would like to have further evaluation done and I have done a memory clinic referral for them.  Discussed with both patient and daughter saying that they may not be an easy solution.  Plan: MEMORY CLINIC REFERRAL            (I10) Hypertension goal BP (blood pressure) < 140/90  Comment:   Plan: Blood pressure on the lower end " of normal.  Again due to her age I would recommend her blood pressure to be slightly on higher side but they are happy with this goal and I will not make any major changes in her blood pressure medications today.    (E11.9) Type 2 diabetes mellitus without complication, without long-term current use of insulin (H)  Comment:   Lab Results   Component Value Date    A1C 5.7 11/06/2018    A1C 5.7 03/29/2018    A1C 6.3 01/09/2018    A1C 5.7 10/11/2017    A1C 6.0 12/13/2016     We have had the same conversation when I saw her last time about her A1c being strictly controlled.  Daughter is worried about possible stroke with a high blood sugar but I reassured her saying that low blood sugar would have a higher risk of complications than high blood sugar for her age.  She is appropriately concerned and again skeptical about this recommendations. We also discussed low blood sugar can also cause confusion. She is currently on 2.5mg dose of glipizide which is understandably low and I am OK with that dose if they really insist but I recommend they try to stop it and monitor her blood sugar and see how she does.   - daughter will think about it. They may stop it and will come back in 3 months for A1c check or they will try 2.5mg every other day and will come back in 6 months. Both options are fine with me.    Plan: Lipid panel reflex to direct LDL Fasting,         Albumin Random Urine Quantitative with Creat         Ratio, Hemoglobin A1c, Comprehensive metabolic         panel, CBC with platelets             (E78.5) Hyperlipidemia LDL goal <100  Comment: again not sure about long term benefit for her age but due to her stroke diagnosis I think ok to stick to same dose of statin.   Plan: lovastatin (MEVACOR) 40 MG tablet             (Z23) Need for prophylactic vaccination and inoculation against influenza  Comment:    Plan: FLU VACCINE, INCREASED ANTIGEN, PRESV FREE, AGE        65+ [56424], Vaccine Administration, Initial          [17273]               Follow up: 3 to 6 months pending their decision as mentioned above.     The above note was dictated using voice recognition. Although reviewed after completion, some word and grammatical error may remain .               Injectable Influenza Immunization Documentation    1.  Is the person to be vaccinated sick today?   No    2. Does the person to be vaccinated have an allergy to a component   of the vaccine?   No  Egg Allergy Algorithm Link    3. Has the person to be vaccinated ever had a serious reaction   to influenza vaccine in the past?   No    4. Has the person to be vaccinated ever had Guillain-Barré syndrome?   No    Form completed by Kandace Murphy CMA

## 2018-11-06 NOTE — MR AVS SNAPSHOT
After Visit Summary   11/6/2018    Reanna Garza    MRN: [id number]           Patient Information     Date Of Birth          12/25/1927        Visit Information        Provider Department      11/6/2018 11:40 AM Tristian Tyson MD [facility]        Today's Diagnoses     Hypertension goal BP (blood pressure) < 140/90    -  1    Type 2 diabetes mellitus without complication, without long-term current use of insulin (H)        Hyperlipidemia LDL goal <100        Need for prophylactic vaccination and inoculation against influenza        Memory change           Follow-ups after your visit        Additional Services     MEMORY CLINIC REFERRAL       Your provider has referred you to: [facility] - [phone]    Please answer the following questions to ensure a successful referral:    Has the patient been diagnosed with any positive neuro-cognitive impairment? Yes, if so what - vascular dementia as per PCP.    Primary objective or goal of consultation (please provide some detail):  Family with incongruent goals   and Patient/family education     What is the name of the person that should be contacted to schedule the appt?  [phone] - daughter.      What is the relationship to the patient? daughter    What is the best number to reach them at?  Any time.     The patient/family will be contacted within 1-2 business days to schedule your appointment. If you haven't heard from us within that timeframe, please call the number above.     Please be aware that coverage of these services is subject to the terms and limitations of your health insurance plan.  Call member services at your health plan with any benefit or coverage questions.      Please bring the following to your appointment:  >>   Any x-rays, CTs or MRIs which have been performed.  Contact the facility where they were done to arrange for  prior to your scheduled appointment.  Any new CT,  "MRI or other procedures ordered by your specialist must be performed at a [facility] facility or coordinated by your clinic's referral office.    >>   List of current medications   >>   This referral request   >>   Any documents/labs given to you for this referral                  Follow-up notes from your care team     Return in about 6 months (around 5/6/2019).      Who to contact     If you have questions or need follow up information about today's clinic visit or your schedule please contact [facility] directly at [phone].  Normal or non-critical lab and imaging results will be communicated to you by MyChart, letter or phone within 4 business days after the clinic has received the results. If you do not hear from us within 7 days, please contact the clinic through MyChart or phone. If you have a critical or abnormal lab result, we will notify you by phone as soon as possible.  Submit refill requests through [facility] or call your pharmacy and they will forward the refill request to us. Please allow 3 business days for your refill to be completed.          Additional Information About Your Visit        Care EveryWhere ID     This is your Care EveryWhere ID. This could be used by other organizations to access your [facility] medical records  [id number]        Your Vitals Were     Pulse Temperature Height Pulse Oximetry BMI (Body Mass Index)       68 98.6  F (37  C) (Oral) 5' 1\" (1.549 m) 95% 29.15 kg/m2        Blood Pressure from Last 3 Encounters:   11/06/18 118/62   08/07/18 108/67   06/27/18 129/71    Weight from Last 3 Encounters:   11/06/18 154 lb 4 oz (70 kg)   08/07/18 151 lb 8 oz (68.7 kg)   06/27/18 154 lb (69.9 kg)              We Performed the Following     Albumin Random Urine Quantitative with Creat Ratio     CBC with platelets     Comprehensive metabolic panel     FLU VACCINE, INCREASED ANTIGEN, PRESV FREE, AGE 65+ [12578]     Hemoglobin A1c     Lipid panel reflex to direct LDL " Fasting     MEMORY CLINIC REFERRAL     Vaccine Administration, Initial [82350]          Where to get your medicines      These medications were sent to [facility] - [facility] [address] AT [facility] [address], [facility] [address]     Phone:  [phone]     lovastatin 40 MG tablet          Primary Care Provider Office Phone # Fax #    Chelita Curtis [phone] [address]  [facility] [address]        Equal Access to Services     [facility]: Hadii aad ku hadasho Soomaali, waaxda luqadaha, qaybta kaalmada adeegyada, waxay parkerin haybibin arpita wren . So [facility] [phone].    ATENCIÓN: Si habla español, tiene a mejia disposición servicios gratuitos de asistencia lingüística. [facility] [phone].    We comply with applicable federal civil rights laws and [place] laws. We do not discriminate on the basis of race, color, national origin, age, disability, sex, sexual orientation, or gender identity.            Thank you!     Thank you for choosing [facility]  for your care. Our goal is always to provide you with excellent care. Hearing back from our patients is one way we can continue to improve our services. Please take a few minutes to complete the written survey that you may receive in the mail after your visit with us. Thank you!             Your Updated Medication List - Protect others around you: Learn how to safely use, store and throw away your medicines at www.disposemymeds.org.          This list is accurate as of 11/6/18 12:18 PM.  Always use your most recent med list.                   Brand Name Dispense Instructions for use Diagnosis    * ACCU-CHEK PATTI Aliya      1 Device daily    Type 2 diabetes, HbA1C goal < 8% (H)       * blood glucose monitoring meter device kit     1 kit    Use to test blood sugars 4 times daily or as directed.    Type 2 diabetes mellitus without complication  (H)       acetaminophen 650 MG 8 hour tablet     100 tablet    Take 650 mg by mouth every 4 hours as needed for mild pain or fever    Stroke (H)       ADVIL PO           aspirin 162 MG EC tablet      Take 1 tablet (162 mg) by mouth daily    Stroke (H)       blood glucose monitoring lancets     204 each    Use to test blood sugar 2 times daily or as directed. Compatible with glucometer.    Type 2 diabetes mellitus without complication, without long-term current use of insulin (H)       * blood glucose monitoring test strip    no brand specified    1 Box    Use to test blood sugar 2 times daily or as directed.    Type 2 diabetes, HbA1C goal < 8% (H)       * ACCU-CHEK PATTI PLUS test strip   Generic drug:  blood glucose monitoring           * ACCU-CHEK PATTI PLUS test strip   Generic drug:  blood glucose monitoring     400 strip    USE AS DIRECTED TO TEST BLOOD SUGAR FOUR TIMES DAILY OR AS DIRECTED    Type 2 diabetes, HbA1C goal < 8% (H)       calcium citrate-vitamin D 315-200 MG-UNIT Tabs per tablet    CALCIUM + D    60 tablet    Take 2 tablets by mouth daily    Routine general medical examination at a health care facility       glipiZIDE 5 MG tablet    GLUCOTROL    45 tablet    Take 1/2 pill every day.    Type 2 diabetes mellitus without complication, without long-term current use of insulin (H)       hydrochlorothiazide 12.5 MG capsule    MICROZIDE    90 capsule    TAKE 1 CAPSULE(12.5 MG) BY MOUTH DAILY    Hypertension goal BP (blood pressure) < 140/90       hypromellose-dextran 0.3-0.1% 0.1-0.3 % Soln ophthalmic solution   Generic drug:  hypromellose-dextran      Apply to eye every 6 hours as needed        losartan 50 MG tablet    COZAAR    90 tablet    TAKE 1 TABLET(50 MG) BY MOUTH DAILY    Hypertension goal BP (blood pressure) < 140/90       lovastatin 40 MG tablet    MEVACOR    90 tablet    Take 1 tablet (40 mg) by mouth At Bedtime    Hyperlipidemia LDL goal <100       vitamin D 2000 units tablet     100 tablet     Take 4,000 Units by mouth daily    Vitamin D deficiency       * Notice:  This list has 5 medication(s) that are the same as other medications prescribed for you. Read the directions carefully, and ask your doctor or other care provider to review them with you.

## 2018-11-07 LAB
ALBUMIN SERPL-MCNC: 4.2 G/DL (ref 3.4–5)
ALP SERPL-CCNC: 48 U/L (ref 40–150)
ALT SERPL W P-5'-P-CCNC: 25 U/L (ref 0–50)
ANION GAP SERPL CALCULATED.3IONS-SCNC: 12 MMOL/L (ref 3–14)
AST SERPL W P-5'-P-CCNC: 22 U/L (ref 0–45)
BILIRUB SERPL-MCNC: 0.8 MG/DL (ref 0.2–1.3)
BUN SERPL-MCNC: 24 MG/DL (ref 7–30)
CALCIUM SERPL-MCNC: 9.6 MG/DL (ref 8.5–10.1)
CHLORIDE SERPL-SCNC: 103 MMOL/L (ref 94–109)
CHOLEST SERPL-MCNC: 155 MG/DL
CO2 SERPL-SCNC: 27 MMOL/L (ref 20–32)
CREAT SERPL-MCNC: 0.88 MG/DL (ref 0.52–1.04)
GFR SERPL CREATININE-BSD FRML MDRD: 61 ML/MIN/1.7M2
GLUCOSE SERPL-MCNC: 65 MG/DL (ref 70–99)
HDLC SERPL-MCNC: 37 MG/DL
LDLC SERPL CALC-MCNC: 84 MG/DL
NONHDLC SERPL-MCNC: 118 MG/DL
POTASSIUM SERPL-SCNC: 3.2 MMOL/L (ref 3.4–5.3)
PROT SERPL-MCNC: 7.4 G/DL (ref 6.8–8.8)
SODIUM SERPL-SCNC: 142 MMOL/L (ref 133–144)
TRIGL SERPL-MCNC: 170 MG/DL

## 2019-01-02 DIAGNOSIS — I10 HYPERTENSION GOAL BP (BLOOD PRESSURE) < 140/90: ICD-10-CM

## 2019-01-02 DIAGNOSIS — E87.6 HYPOKALEMIA: Primary | ICD-10-CM

## 2019-01-02 NOTE — LETTER
January 9, 2019      Reanna Garza  [address] [place] [id number]        Dear Reanna,     In order to ensure we are providing the best quality care, we have reviewed your chart and see that you are due for:  1.   schedule lab only appointment for repeat BMP for hypokalemia     Please call the clinic at your earliest convenience to schedule an appointment.    We greatly appreciate the opportunity to serve you.  Thank you for trusting us with your health care.    Your care team at [facility]        Sincerely,        Tristian Tyson MD, MD

## 2019-01-03 NOTE — TELEPHONE ENCOUNTER
"Requested Prescriptions   Pending Prescriptions Disp Refills     hydrochlorothiazide (MICROZIDE) 12.5 MG capsule [Pharmacy Med Name: HYDROCHLOROTHIAZIDE 12.5MG CAPSULES]  Last Written Prescription Date:  10/11/2017  Last Fill Quantity: 90 capsule,  # refills: 3   Last Office Visit: 11/6/2018   Future Office Visit:      90 capsule 0     Sig: TAKE 1 CAPSULE BY MOUTH EVERY DAY    Diuretics (Including Combos) Protocol Failed - 1/2/2019  7:53 PM       Failed - Normal serum potassium on file in past 12 months    Recent Labs   Lab Test 11/06/18  1124   POTASSIUM 3.2*           Passed - Blood pressure under 140/90 in past 12 months    BP Readings from Last 3 Encounters:   11/06/18 118/62   08/07/18 108/67   06/27/18 129/71          Passed - Recent (12 mo) or future (30 days) visit within the authorizing provider's specialty    Patient had office visit in the last 12 months or has a visit in the next 30 days with authorizing provider or within the authorizing provider's specialty.  See \"Patient Info\" tab in inbasket, or \"Choose Columns\" in Meds & Orders section of the refill encounter.           Passed - Patient is age 18 or older       Passed - No active pregancy on record       Passed - Normal serum creatinine on file in past 12 months    Recent Labs   Lab Test 11/06/18  1124   CR 0.88           Passed - Normal serum sodium on file in past 12 months    Recent Labs   Lab Test 11/06/18  1124              Passed - No positive pregnancy test in past 12 months          "

## 2019-01-04 RX ORDER — HYDROCHLOROTHIAZIDE 12.5 MG/1
CAPSULE ORAL
Qty: 90 CAPSULE | Refills: 0 | Status: SHIPPED | OUTPATIENT
Start: 2019-01-04 | End: 2019-08-07

## 2019-01-04 NOTE — TELEPHONE ENCOUNTER
ASIF Tyson   when do you want to recheck pt K level?  Potassium   Date Value Ref Range Status   11/06/2018 3.2 (L) 3.4 - 5.3 mmol/L Final     Thanks!     Mirna Ponce RN

## 2019-01-04 NOTE — TELEPHONE ENCOUNTER
Please help them to schedule lab only for repeat BMP for hypokalemia.  Signed future order.   Refilled rx

## 2019-01-28 ENCOUNTER — TELEPHONE (OUTPATIENT)
Dept: FAMILY MEDICINE | Facility: CLINIC | Age: 84
End: 2019-01-28

## 2019-01-28 NOTE — TELEPHONE ENCOUNTER
Reason for Call:  Other call back    Detailed comments: Patients daughter has many questions and concerns  that she would like to discuss and would also lab like labs placed too.     Phone Number Patient can be reached at: Other phone number:  [phone]*    Best Time: Any     Can we leave a detailed message on this number? YES    Call taken on 1/28/2019 at 11:05 AM by Nate Louise

## 2019-01-28 NOTE — TELEPHONE ENCOUNTER
"Dr. Curtis-Please review and may close encounter.  Patient's daughter requested this information be sent to you prior to patient's 2/5/19 appt.    Consent to communicate with Marcella on file.    Writer called Marcella.    Over 25 minutes spent speaking with Marcella.    Per Marcella:  1. Patient exhibits strange symptoms when taking Glipizide 1/2 tablet every other day   A. Been on this dose for about 1 year   B. One of Marcella's sisters lives with patient and manages medications  2. Symptoms are as follows:   A. Patient will insist her  is not himself, but an \"imposter\" and has stated he looks like her  but is her 's brother   B. Patient has called Marcella stating she does not know where she is-she is in what looks like her home, but is not actually her home.  3. Patient's waking blood sugars are 125  4. When patient takes glipizide 1/2 tablet BID, she does not have these strange symptoms  5. 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 Has been trying to get her sister to take patient's blood sugars twice daily, to try to capture if there is coorrelation between symptoms and elevated blood glucose  7. Would like permission from provider to increase patient's Glipizide 1/2 tablet to BID dosing    Marcella also explained patient's stroke history and neurologist specifying patient's A1C is not to be above 6.    Writer reviewed patient's most recent A1C with Marcella.    Writer recommended:  1. Office visit for further evaluation and to discuss patient's symptoms and glipizide dosing  2. Take blood sugar reading when patient is having one of these episodes.    Appt scheduled with Dr. Curtis, per Marcella's request, on 2/5/19.  Appt date, time and location confirmed with Marcella.  Marcella will accompany patient to appointment.    Patient will complete BMP and albumin urine when " in clinic for 2/5/19.    Marcella verbalized understanding and in agreement with plan.    Marcella requests this documentation be sent to Dr. Curtis.    Thank you!  JAMAR Carlson, BSN, RN

## 2019-01-29 NOTE — TELEPHONE ENCOUNTER
Thank you, getting the blood sugar values during episodes of paranoia will be valuable.  They could also consider a neurology appointment, I'll discuss at visit.  Sincerely,  Dr. Chelita Curtis MD  1/28/2019

## 2019-02-11 ENCOUNTER — TELEPHONE (OUTPATIENT)
Dept: FAMILY MEDICINE | Facility: CLINIC | Age: 84
End: 2019-02-11

## 2019-02-11 NOTE — TELEPHONE ENCOUNTER
Reason for Call:  Medication or medication refill: medication for her diabetes and has questions SEBASTIEN to discuss on file    Do you use a [facility]?  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 YES    Phone number patient can be reached at:   Best Time:     Call taken on 2/11/2019 at 8:45 AM by Mai Jacobson

## 2019-02-11 NOTE — TELEPHONE ENCOUNTER
"Patient daughter Marcella called to speak to Dr. Curtis. Call was redirected to triage. 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   Marcella proceeded to go over her concerns and disagreements with Dr. Tyson's change in patient med administration of Glipizide she stated was changed from BID to every other day.     Currently patient in on   glipiZIDE (GLUCOTROL) 5 MG tablet   11/6/2018  No   Sig: Take 1/2 pill every other day.     Patient attributing mother's paranoid episodes to the change in Glipizide administration and Marcella believes mothers  blood sugars are high during the episodes. Writer asked if Marcella was taking patient BG when the paranoid episodes were occurring but Marcella stated that her sister refused to take it.  Marcella states her sister Belle (who manages mothers meds) thinks Marcella \"micromanages her mother too much.\"       Marcella requested that the date of the med change be found in patient chart.  Writer read plan from 10/11:  \"3. Type 2 diabetes mellitus without complication, without long-term current use of insulin (H)   with history of hypoglycemia that required ER visit. Her A1c is 5.7 and I worry about hypoglycemia again with her. Previously she had worsening of blood sugar upon stopping meds and family were concerned about we are currently using 2.5mg of glipizide.   Glipizide may not be most ideal agent due to concern of hypoglycemia but we will start with taking it every other day and check A1c in 3 months. Notified patient and family via letter.\"    Marcella became very upset by this information although the information was relayed to her at the appt. Writer stated the information was in the AVS on that date and was relayed to her. The response of Marcella did not appear to match up with what was being reiterated and Marcella appeared convinced the plan added to belief that the Glipizide should not " "have been changed.     Marcella became defensive when suggestions to get evidence to support her claims were brought up.     The conversation regarding the Glipizide medication change went on for >20 minutes with Marcella getting increasingly frustrated and upset by this med change. Writer empathized with her frustration and  writer attempted to keep conversation on what the need was but it was very difficult .       Writer stated to Marcella that it is best she come into clinic to discuss any concerns with a provider.  Marcella stated that she has another appt. Scheduled on Tuesday 2/19 with Dr. Curtis. Writer reiterated Dr. Curtis's message from 1/28:      \"Thank you, getting the blood sugar values during episodes of paranoia will be valuable.  They could also consider a neurology appointment, I'll discuss at visit.  Sincerely,  Dr. Chelita Curtis MD  1/28/2019\"    Marcella stated she would relay above message from Dr. Curtis to her sister so there was kaleb information regarding the paranoid episodes and assumed high blood sugars.     Thanks! Jessica Augustin RN          "

## 2019-02-14 NOTE — TELEPHONE ENCOUNTER
Reason for Call:  Other call back    Detailed comments: Patients daughter Marcella called back and was upset that she has not gotten a call back from Dr. Curtis. Marcella voiced her concerns with writer & stated that the RN she spoke with on Monday seemed to take it personal when Marcella was questioning the med change. She then asked what the RN had written about her & was this why Dr. Curtis did not want to call her back? Writer informed Marcella that she wrote a paragraph regarding her concerns with meds. She would like a call back today. Please advise, thank you!    She'll be with patient today, an alternate # is [phone], call patients home ph first    Phone Number Patient can be reached at: Home number on file [phone] (home)    Best Time: anytime    Can we leave a detailed message on this number? NO    Call taken on 2/14/2019 at 12:53 PM by Melody Mcmullen

## 2019-02-22 ENCOUNTER — OFFICE VISIT (OUTPATIENT)
Dept: FAMILY MEDICINE | Facility: CLINIC | Age: 84
End: 2019-02-22
Payer: COMMERCIAL

## 2019-02-22 VITALS
BODY MASS INDEX: 28.32 KG/M2 | RESPIRATION RATE: 16 BRPM | TEMPERATURE: 97.9 F | DIASTOLIC BLOOD PRESSURE: 68 MMHG | SYSTOLIC BLOOD PRESSURE: 125 MMHG | OXYGEN SATURATION: 94 % | HEART RATE: 64 BPM | WEIGHT: 150 LBS | HEIGHT: 61 IN

## 2019-02-22 DIAGNOSIS — E11.9 TYPE 2 DIABETES MELLITUS WITHOUT COMPLICATION, WITHOUT LONG-TERM CURRENT USE OF INSULIN (H): Primary | ICD-10-CM

## 2019-02-22 DIAGNOSIS — E11.9 TYPE 2 DIABETES MELLITUS WITHOUT COMPLICATION, WITHOUT LONG-TERM CURRENT USE OF INSULIN (H): ICD-10-CM

## 2019-02-22 DIAGNOSIS — F01.50 VASCULAR DEMENTIA WITHOUT BEHAVIORAL DISTURBANCE (H): ICD-10-CM

## 2019-02-22 LAB — HBA1C MFR BLD: 5.9 % (ref 0–5.6)

## 2019-02-22 PROCEDURE — 83036 HEMOGLOBIN GLYCOSYLATED A1C: CPT | Performed by: FAMILY MEDICINE

## 2019-02-22 PROCEDURE — 36415 COLL VENOUS BLD VENIPUNCTURE: CPT | Performed by: FAMILY MEDICINE

## 2019-02-22 PROCEDURE — 82043 UR ALBUMIN QUANTITATIVE: CPT | Performed by: FAMILY MEDICINE

## 2019-02-22 PROCEDURE — 99214 OFFICE O/P EST MOD 30 MIN: CPT | Performed by: FAMILY MEDICINE

## 2019-02-22 RX ORDER — GLIPIZIDE 5 MG/1
5 TABLET ORAL
Qty: 60 TABLET | Refills: 3 | Status: SHIPPED | OUTPATIENT
Start: 2019-02-22 | End: 2019-02-22

## 2019-02-22 ASSESSMENT — MIFFLIN-ST. JEOR: SCORE: 1032.78

## 2019-02-22 NOTE — PROGRESS NOTES
"  SUBJECTIVE:   Reanna Garza is a 89 year old female who presents to clinic today for the following health issues:  Diabetes Follow-up    Patient is checking blood sugars: once daily.  Results are as follows:         evening     Diabetic concerns: she seems to get more confused when her blood sugar is a little higher, she's been \"child like\" and confused when blood sugars are 170 -200.    Both daughters care for her, helping with meds and bringing meals, so Marlene, who accompanies her today, does believe she's getting regular healthy meals     Symptoms of hypoglycemia (low blood sugar): confusion; Marlene reports one episode several years ago when her mother essentially took her medication twice when the blood sugars got critically low, but hasn't occurred since     Paresthesias (numbness or burning in feet) or sores: No     Date of last diabetic eye exam: 02/2019    BP Readings from Last 2 Encounters:   11/06/18 118/62   08/07/18 108/67     Hemoglobin A1C (%)   Date Value   11/06/2018 5.7 (H)   03/29/2018 5.7     LDL Cholesterol Calculated (mg/dL)   Date Value   11/06/2018 84   06/27/2018 58       Diabetes Management Resources    Amount of exercise or physical activity: Around the house    Problems taking medications regularly: No    Medication side effects: none    Diet: regular (no restrictions)    Problem list and histories reviewed & adjusted, as indicated.  Additional history: as documented    Reviewed and updated as needed this visit by clinical staff       Reviewed and updated as needed this visit by Provider         ROS:  Constitutional, HEENT, cardiovascular, pulmonary, gi and gu systems are negative, except as otherwise noted.    OBJECTIVE:     /68 (BP Location: Left arm, Patient Position: Sitting, Cuff Size: Adult Regular)   Pulse 64   Temp 97.9  F (36.6  C) (Oral)   Resp 16   Ht 1.549 m (5' 1\")   Wt 68 kg (150 lb)   SpO2 94%   BMI 28.34 kg/m    Body mass index is 28.34 kg/m .  GENERAL: " healthy, alert and no distress, smiling  Accompanied by daughter Marlene who provides most of the history  NECK: no adenopathy, no asymmetry, masses, or scars, thyroid normal to palpation and no carotid bruits  RESP: lungs clear to auscultation - no rales, rhonchi or wheezes  CV: regular rate and rhythm, normal S1 S2, no S3 or S4, no murmur, click or rub, no peripheral edema and peripheral pulses strong  Neuro:  Pupils equal, round, reactive to light.    Gait: she ade from a chair with some difficulty and has a mildly broad-based gait, uses 4- legged walker    Diagnostic Test Results:  Results for orders placed or performed in visit on 02/22/19 (from the past 24 hour(s))   Hemoglobin A1c   Result Value Ref Range    Hemoglobin A1C 5.9 (H) 0 - 5.6 %       ASSESSMENT/PLAN:       1. Type 2 diabetes mellitus without complication, without long-term current use of insulin (H)  At goal by numbers but behavior changes with slightly elevated blood sugars, I did increase her glipizide with very careful instructions on how to use (only take with meals and make sure meals have protein and complex carbohydrates).  I also recommend qid testing, qah and qac for close monitoring.  Daughter is very concerned and very detail orientated.    More than 50% of this 25 minute visit was spent counseling pt as noted above.       - Hemoglobin A1c  - blood glucose (ACCU-CHEK PATTI PLUS) test strip; USE AS DIRECTED TO TEST BLOOD SUGAR FOUR TIMES DAILY OR AS DIRECTED  Dispense: 400 strip; Refill: 3  - blood glucose monitoring (ACCU-CHEK MULTICLIX) lancets; Use to test blood sugar 4 times daily.  Dispense: 400 each; Refill: 3  - glipiZIDE (GLUCOTROL) 5 MG tablet; Take 1 tablet (5 mg) by mouth 2 times daily (before meals) You must take this medication with a meal that includes protein and a whole grain  Dispense: 60 tablet; Refill: 3    2. Vascular dementia without behavioral disturbance  She is more sensitive to minor variations in sugars, need to  titrate carefully      Return to clinic 3 month diabetes follow up, but daughter advised to monitor fsbg carefully and let me know if not at goal.  Please see patient instructions below.       Chelita Curtis MD  [facility]

## 2019-02-22 NOTE — PATIENT INSTRUCTIONS
Results for orders placed or performed in visit on 02/22/19   Hemoglobin A1c   Result Value Ref Range    Hemoglobin A1C 5.9 (H) 0 - 5.6 %      You must take your glipizide with a meal that includes protein (eggs, nuts, peanut butter, humus, beans, cheese, cottage cheese) and a whole grain (whole wheat toast, popcorn, oatmeal).     You can add protein powder to soft foods.    Do not eat any simple carbohydrates by themselves; have cake with a class of milk, peanut butter on toast, candy with a meal.    Check morning fasting glucose and one hour after meals.

## 2019-02-22 NOTE — TELEPHONE ENCOUNTER
"Requested Prescriptions   Pending Prescriptions Disp Refills     glipiZIDE (GLUCOTROL) 5 MG tablet [Pharmacy Med Name: GLIPIZIDE 5MG TABLETS]  This may be a duplicate refill request  Last Written Prescription Date:  2/22/2019  Last Fill Quantity: 60 tabs,  # refills: 3   Last office visit: 2/22/2019 with prescribing provider:  Ever   Future Office Visit:     540 tablet 3     Sig: TAKE 1 TABLET BY MOUTH TWICE DAILY(BEFORE MEALS)    Sulfonylurea Agents Failed - 2/22/2019  1:16 PM       Failed - Patient has had a Microalbumin in the past 12 mos.    Recent Labs   Lab Test 10/11/17  1424   MICROL 24   UMALCR 44.80*            Passed - Blood pressure less than 140/90 in past 6 months    BP Readings from Last 3 Encounters:   02/22/19 125/68   11/06/18 118/62   08/07/18 108/67                Passed - Patient has documented LDL within the past 12 mos.    Recent Labs   Lab Test 11/06/18  1124   LDL 84            Passed - Patient has documented A1c within the specified period of time.    If HgbA1C is 8 or greater, it needs to be on file within the past 3 months.  If less than 8, must be on file within the past 6 months.     Recent Labs   Lab Test 02/22/19  1151   A1C 5.9*            Passed - Medication is active on med list       Passed - Patient is age 18 or older       Passed - No active pregnancy on record       Passed - Patient has a recent creatinine (normal) within the past 12 mos.    Recent Labs   Lab Test 11/06/18  1124   CR 0.88            Passed - Patient has not had a positive pregnancy test within the past 12 mos.       Passed - Recent (6 mo) or future (30 days) visit within the authorizing provider's specialty    Patient had office visit in the last 6 months or has a visit in the next 30 days with authorizing provider or within the authorizing provider's specialty.  See \"Patient Info\" tab in inbasket, or \"Choose Columns\" in Meds & Orders section of the refill encounter.              "

## 2019-02-22 NOTE — LETTER
February 26, 2019      Reanna Garza  [address] [place] [id number]        Dear Parminder Forte!  It was a pleasure to see you in clinic!  Thank you for getting labs done.     Your microalbumen is elevated. This means you have some protein in the urine. It indicates that your kidneys are being affected by diabetes. Keeping blood pressure and blood fats low is the best treatment in order to keep this  stable. Please try to get #3 8 oz glasses of water daily.    Your HgA1C, also called glycosylated hemoglobin, which measures the level of sugar in your blood over the past few months, is at goal, which is great! Congratulations    Results for orders placed or performed in visit on 02/22/19   Hemoglobin A1c   Result Value Ref Range    Hemoglobin A1C 5.9 (H) 0 - 5.6 %   Albumin Random Urine Quantitative with Creat Ratio   Result Value Ref Range    Creatinine Urine 54 mg/dL    Albumin Urine mg/L 14 mg/L    Albumin Urine mg/g Cr 26.48 (H) 0 - 25 mg/g Cr               Sincerely,        Chelita Curtis MD/bee

## 2019-02-24 LAB
CREAT UR-MCNC: 54 MG/DL
MICROALBUMIN UR-MCNC: 14 MG/L
MICROALBUMIN/CREAT UR: 26.48 MG/G CR (ref 0–25)

## 2019-02-25 RX ORDER — GLIPIZIDE 5 MG/1
5 TABLET ORAL
Qty: 180 TABLET | Refills: 0 | Status: SHIPPED | OUTPATIENT
Start: 2019-02-25 | End: 2019-05-07

## 2019-02-25 NOTE — TELEPHONE ENCOUNTER
Message sent to pharmacy - THIS ORDER TAKES THE PLACE OF THE ORDER SENT 2/22/19.  THIS ORDER IS BEING SENT BECAUSE PHARMACY SAYS PATIENT IS REQUESTING 90 DAY SUPPLY.

## 2019-02-26 NOTE — RESULT ENCOUNTER NOTE
Hello!  It was a pleasure to see you in clinic!  Thank you for getting labs done.     Your microalbumen is elevated. This means you have some protein in the urine. It indicates that your kidneys are being affected by diabetes. Keeping blood pressure and blood fats low is the best treatment in order to keep this  stable. Please try to get #3 8 oz glasses of water daily.    Your HgA1C, also called glycosylated hemoglobin, which measures the level of sugar in your blood over the past few months, is at goal, which is great! Congratulations!    Sincerely,  Dr. Chelita Curtis MD  2/25/2019

## 2019-03-26 ENCOUNTER — TELEPHONE (OUTPATIENT)
Dept: FAMILY MEDICINE | Facility: CLINIC | Age: 84
End: 2019-03-26

## 2019-03-26 NOTE — TELEPHONE ENCOUNTER
She is diabetic but for her age and due to very low A1c I would not suggest any dietary restrictions. Just avoid excessive sugar and carbs but overall no specific restrictions.

## 2019-03-26 NOTE — LETTER
[facility]  [address] [place] [id number] [phone]          2019    Re: Reanna Garza                                                                                                                     [address] [place] [id number]            To Whom It May Concern:    Reanna REYES Greg, : 27, is a patient of mine who does not have any dietary restrictions.        Sincerely,         Tristian Tyson MD/at

## 2019-03-26 NOTE — TELEPHONE ENCOUNTER
Consent to communicate with Belle on file.    Letter signed by Dr. Tyson and placed at .    Message left for Belle:  1.Dr. Tyson signed letter for patient and letter placed at  and picture ID needed to  letter.    JAMAR Carlson, BSN, RN

## 2019-03-26 NOTE — TELEPHONE ENCOUNTER
Reason for Call:  Other note/letter    Detailed comments: Daughter Belle is calling asking for a note/letter for her mother for a womens retreat that they are going on this weekend. This note/letter is to state any dietary restrictions if she has any, if she does not have any need note/letter stating that. Please call when this will be ready for      Phone Number Patient can be reached at: Other phone number:  [phone]    Best Time:     Can we leave a detailed message on this number? YES    Call taken on 3/26/2019 at 11:43 AM by Mai Jacobson

## 2019-03-26 NOTE — TELEPHONE ENCOUNTER
Dr Tyson - Does patient have any special dietary restrictions that would need to be made known [facility]  Patient is 89 and Type 2 diabetic.  Nayana Larkin RN   Pt resting in bed and pt has tolerated the infusion of immune globuline and no sign of reaction noticed. call light within reach.will continue to [facility] [place]

## 2019-04-05 DIAGNOSIS — I10 HYPERTENSION GOAL BP (BLOOD PRESSURE) < 140/90: ICD-10-CM

## 2019-04-05 NOTE — TELEPHONE ENCOUNTER
"Requested Prescriptions   Pending Prescriptions Disp Refills     hydrochlorothiazide (MICROZIDE) 12.5 MG capsule [Pharmacy Med Name: HYDROCHLOROTHIAZIDE 12.5MG CAPSULES]  Last Written Prescription Date:  1/4/2019  Last Fill Quantity: 90capsule,  # refills: 0   Last Office Visit: 2/22/2019 Ever  Future Office Visit:      90 capsule 0     Sig: TAKE 1 CAPSULE BY MOUTH EVERY DAY    Diuretics (Including Combos) Protocol Failed - 4/5/2019 12:06 PM       Failed - Normal serum potassium on file in past 12 months    Recent Labs   Lab Test 11/06/18  1124   POTASSIUM 3.2*             Passed - Blood pressure under 140/90 in past 12 months    BP Readings from Last 3 Encounters:   02/22/19 125/68   11/06/18 118/62   08/07/18 108/67            Passed - Recent (12 mo) or future (30 days) visit within the authorizing provider's specialty    Patient had office visit in the last 12 months or has a visit in the next 30 days with authorizing provider or within the authorizing provider's specialty.  See \"Patient Info\" tab in inbasket, or \"Choose Columns\" in Meds & Orders section of the refill encounter.             Passed - Medication is active on med list       Passed - Patient is age 18 or older       Passed - No active pregancy on record       Passed - Normal serum creatinine on file in past 12 months    Recent Labs   Lab Test 11/06/18  1124   CR 0.88             Passed - Normal serum sodium on file in past 12 months    Recent Labs   Lab Test 11/06/18  1124                Passed - No positive pregnancy test in past 12 months          "

## 2019-04-08 RX ORDER — HYDROCHLOROTHIAZIDE 12.5 MG/1
CAPSULE ORAL
Qty: 90 CAPSULE | Refills: 0 | OUTPATIENT
Start: 2019-04-08

## 2019-04-08 NOTE — TELEPHONE ENCOUNTER
Routing refill request to provider for review/approval because:  Labs out of range:    Potassium   Date Value Ref Range Status   11/06/2018 3.2 (L) 3.4 - 5.3 mmol/L Final

## 2019-04-08 NOTE — TELEPHONE ENCOUNTER
Writer called patient and reviewed message per Dr. Tyson.    Patient verbalized understanding and asked writer to review message with her daughter, Belle.    Belle got on phone and writer reviewed message per Dr. Tyson.    Belle verbalized understanding and in agreement with plan.    Belle stated patient's other daughter, Marcella will call back to schedule MA and lab appts, as Marcella takes patient to medical appointments.    Writer reviewed clinic number with Belle.    DEV EldridgeN, RN

## 2019-04-17 ENCOUNTER — OFFICE VISIT (OUTPATIENT)
Dept: URGENT CARE | Facility: URGENT CARE | Age: 84
End: 2019-04-17
Payer: MEDICARE

## 2019-04-17 ENCOUNTER — ANCILLARY PROCEDURE (OUTPATIENT)
Dept: GENERAL RADIOLOGY | Facility: CLINIC | Age: 84
End: 2019-04-17
Attending: INTERNAL MEDICINE
Payer: MEDICARE

## 2019-04-17 VITALS
DIASTOLIC BLOOD PRESSURE: 64 MMHG | OXYGEN SATURATION: 96 % | SYSTOLIC BLOOD PRESSURE: 152 MMHG | TEMPERATURE: 100 F | HEART RATE: 58 BPM
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
## 2019-04-17 DIAGNOSIS — M79.622 PAIN OF LEFT UPPER ARM: ICD-10-CM

## 2019-04-17 DIAGNOSIS — S62.639A CLOSED AVULSION FRACTURE OF DISTAL PHALANX OF FINGER, INITIAL ENCOUNTER: Primary | ICD-10-CM

## 2019-04-17 DIAGNOSIS — S69.92XA THUMB INJURY, LEFT, INITIAL ENCOUNTER: ICD-10-CM

## 2019-04-17 PROCEDURE — 73140 X-RAY EXAM OF FINGER(S): CPT | Mod: LT

## 2019-04-17 PROCEDURE — 99214 OFFICE O/P EST MOD 30 MIN: CPT | Performed by: INTERNAL MEDICINE

## 2019-04-17 PROCEDURE — 73060 X-RAY EXAM OF HUMERUS: CPT | Mod: LT

## 2019-04-17 NOTE — PROGRESS NOTES
SUBJECTIVE:   Reanna Garza is a 89 year old female presenting with a chief complaint of   Chief Complaint   Patient presents with     Urgent Care     Thumb Discomfort     Fell Sunday or Monday night at hotel, doesn't remember fall, one of her daughters who was there said pt did not lose consciousness, landed on left arm.  Left thumb is swollen, bruised painful.  Pt does take baby aspirin daily.       She is an established patient of [facility].    MS Injury/Pain    Onset of symptoms was 3 day(s) ago.  Location: left finger  first  Context:       The injury happened while while walking      Mechanism: fall       Patient experienced delayed pain, delayed swelling  Course of symptoms is same.    Severity mild  Current and Associated symptoms: pain thumb  And upper arm - daughter feels bump    Aggravating Factors: use  Therapies to improve symptoms include: ibuprofen or tylenol    Review of Systems    Past Medical History:   Diagnosis Date     Abdominal aneurysm without mention of rupture      Arthritis      CVA (cerebral infarction)      DJD (degenerative joint disease)      Hyperlipidemia LDL goal <130      Hypertension goal BP (blood pressure) < 140/90      Low back pain      Right hip pain      Stroke (H)      Type 2 diabetes, HbA1C goal < 8% (H)      Family History   Problem Relation Age of Onset     Cancer Brother      Cancer Sister      Breast Cancer Sister      Cancer Sister      Cancer Sister      Cancer Sister      Alzheimer Disease Sister      Current Outpatient Medications   Medication Sig Dispense Refill     aspirin  MG EC tablet Take 1 tablet (162 mg) by mouth daily       calcium citrate-vitamin D (CALCIUM + D) 315-200 MG-UNIT TABS Take 2 tablets by mouth daily 60 tablet      Cholecalciferol (VITAMIN D) 2000 UNITS tablet Take 4,000 Units by mouth daily 100 tablet 3     glipiZIDE (GLUCOTROL) 5 MG tablet Take 1 tablet (5 mg) by mouth 2 times daily (before meals) You must take this medication with a  meal that includes protein and a whole grain 180 tablet 0     Ibuprofen (ADVIL PO)        losartan (COZAAR) 50 MG tablet TAKE 1 TABLET(50 MG) BY MOUTH DAILY 90 tablet 2     lovastatin (MEVACOR) 40 MG tablet Take 1 tablet (40 mg) by mouth At Bedtime 90 tablet 3     ACCU-CHEK PATTI PLUS test strip        acetaminophen 650 MG TABS Take 650 mg by mouth every 4 hours as needed for mild pain or fever (Patient not taking: Reported on 2019) 100 tablet      ARTIFICIAL TEARS 0.1-0.3 % SOLN Apply to eye every 6 hours as needed       blood glucose (ACCU-CHEK PATTI PLUS) test strip USE AS DIRECTED TO TEST BLOOD SUGAR FOUR TIMES DAILY OR AS DIRECTED (Patient not taking: Reported on 2019) 400 strip 3     blood glucose monitoring (ACCU-CHEK PATTI PLUS) meter device kit Use to test blood sugars 4 times daily or as directed. (Patient not taking: Reported on 2019) 1 kit 0     blood glucose monitoring (ACCU-CHEK MULTICLIX) lancets Use to test blood sugar 4 times daily. (Patient not taking: Reported on 2019) 400 each 3     Blood Glucose Monitoring Suppl (ACCU-CHEK PATTI) DEANNA 1 Device daily (Patient not taking: Reported on 2019)       blood glucose test strip Use to test blood sugar 2 times daily or as directed. (Patient not taking: Reported on 2019) 1 Box prn     hydrochlorothiazide (MICROZIDE) 12.5 MG capsule TAKE 1 CAPSULE BY MOUTH EVERY DAY (Patient not taking: Reported on 2019) 90 capsule 0     Social History     Tobacco Use     Smoking status: Former Smoker     Last attempt to quit: 1999     Years since quittin.4     Smokeless tobacco: Never Used   Substance Use Topics     Alcohol use: Yes     Comment: rare       OBJECTIVE  /64   Pulse 58   Temp 100  F (37.8  C) (Oral)   SpO2 96%     Physical Exam   Constitutional: She appears well-developed and well-nourished.   Musculoskeletal:   left upper extremities     Shoulder nontender on exam  Discomfort upper humerus.  No ecchymosis  or swelling noted  Elbow/ nontender   Forearm nontender     Thumb - purplish brudsing & swelling greatest at IP joint  Remainder hand exam normal    Vitals reviewed.      Labs:  No results found for this or any previous visit (from the past 24 hour(s)).    X-Ray was done, my findings are:   Xray - shoulder/humerus/elbow views appear normal   Thumb IP joint avulsion fracture distal phalayx    ASSESSMENT:      ICD-10-CM    1. Closed avulsion fracture of distal phalanx of finger, initial encounter S62.639A ORTHO  REFERRAL   2. Thumb injury, left, initial encounter S69.92XA XR Finger Left G/E 2 Views     ORTHO  REFERRAL   3. Pain of left upper arm M79.622 XR Humerus Left G/E 2 Views   4. Fall due to stumbling, initial encounter W01.0XXA XR Finger Left G/E 2 Views     XR Humerus Left G/E 2 Views        Medical Decision Making:    Differential Diagnosis:  MS Injury Pain: sprain, fracture and contusion    Serious Comorbid Conditions:  Adult:  Diabetes    PLAN:    MS Injury/Pain  ice, elevate, rest and splint:   Daughter states her mother can take ibuprofen or Tylenol.  Recommend Tylenol as needed    Discussed she has a avulsion fracture in the thumb joint.  Patient at this time prefers to follow-up with her primary.  I did give her an orthopedic referral in addition.      Patient Instructions   Finger splint to immobilize fracture of thumb while heals  Ice  Elevate to decrease swelling        Immobilize 4 weeks.    Orthopedic referral    Plans to see primary next week.      Patient Education     Finger Fracture, Closed  You have a broken finger (fracture). This causes local pain, swelling, and bruising. This injury usually takes about 4 to 6 weeks to heal, but can take longer in some cases. Finger injuries are often treated with a splint or cast, or by taping the injured finger to the next one (buddy taping). This protects the injured finger and holds the bone in position while it heals. More serious  fractures may need surgery.     If the fingernail has been severely injured, it will probably fall off in 1 to 2 weeks. A new fingernail will usually start to grow back within a month.  Home care  Follow these guidelines when caring for yourself at home:    Keep your hand elevated to reduce pain and swelling. When sitting or lying down keep your arm above the level of your heart. You can do this by placing your arm on a pillow that rests on your chest or on a pillow at your side. This is most important during the first 2 days (48 hours) after the injury.    Put an ice pack on the injured area. Do this for 20 minutes every 1 to 2 hours the first day for pain relief. You can make an ice pack by wrapping a plastic bag of ice cubes in a thin towel. As the ice melts, be careful that the cast or splint doesn t get wet. Continue using the ice pack 3 to 4 times a day until the pain and swelling go away.    Keep the cast or splint completely dry at all times. Bathe with your cast or splint out of the water. Protect it with a large plastic bag, rubber-banded at the top end. If a fiberglass cast or splint gets wet, you can dry it with a hair dryer.    If khushi tape was put on and it becomes wet or dirty, change it. You may replace it with paper, plastic, or cloth tape. Cloth tape and paper tapes must be kept dry. Keep the khushi tape in place for at least 4 weeks.    You may use acetaminophen or ibuprofen to control pain, unless another pain medicine was prescribed. If you have chronic liver or kidney disease, talk with your healthcare provider before using these medicines. Also talk with your provider if you ve had a stomach ulcer or gastrointestinal bleeding.    Don t put creams or objects under the cast if you have itching.  Follow-up care  Follow up with your healthcare provider, or as advised. This is to make sure the bone is healing the way it should.  X-rays may be taken. You will be told of any new findings that may  affect your care.  When to seek medical advice  Call your healthcare provider right away if any of these occur:    The plaster cast or splint becomes wet or soft    The cast or splint cracks    The fiberglass cast or splint stays wet for more than 24 hours    Pain or swelling gets worse    Redness, warmth, swelling, drainage from the wound, or foul odor from a cast or splint    Finger becomes more cold, blue, numb, or tingly    You can t move your finger    The skin around the cast or splint becomes red    Fever of 100.4 F (38 C) or higher, or as directed by your healthcare provider  Date Last Reviewed: 2/1/2017 2000-2018 [employer]. [address] [place] [address], [place] [address]. All rights reserved. This information is not intended as a substitute for professional medical care. Always follow your healthcare professional's instructions.

## 2019-04-17 NOTE — PATIENT INSTRUCTIONS
Finger splint to immobilize fracture of thumb while heals  Ice  Elevate to decrease swelling    Vit d with calcium    Immobilize 4 weeks.    Orthopedic referral    Plans to see primary next week.      Patient Education     Finger Fracture, Closed  You have a broken finger (fracture). This causes local pain, swelling, and bruising. This injury usually takes about 4 to 6 weeks to heal, but can take longer in some cases. Finger injuries are often treated with a splint or cast, or by taping the injured finger to the next one (khushi taping). This protects the injured finger and holds the bone in position while it heals. More serious fractures may need surgery.     If the fingernail has been severely injured, it will probably fall off in 1 to 2 weeks. A new fingernail will usually start to grow back within a month.  Home care  Follow these guidelines when caring for yourself at home:    Keep your hand elevated to reduce pain and swelling. When sitting or lying down keep your arm above the level of your heart. You can do this by placing your arm on a pillow that rests on your chest or on a pillow at your side. This is most important during the first 2 days (48 hours) after the injury.    Put an ice pack on the injured area. Do this for 20 minutes every 1 to 2 hours the first day for pain relief. You can make an ice pack by wrapping a plastic bag of ice cubes in a thin towel. As the ice melts, be careful that the cast or splint doesn t get wet. Continue using the ice pack 3 to 4 times a day until the pain and swelling go away.    Keep the cast or splint completely dry at all times. Bathe with your cast or splint out of the water. Protect it with a large plastic bag, rubber-banded at the top end. If a fiberglass cast or splint gets wet, you can dry it with a hair dryer.    If khushi tape was put on and it becomes wet or dirty, change it. You may replace it with paper, plastic, or cloth tape. Cloth tape and paper tapes must be  kept dry. Keep the khushi tape in place for at least 4 weeks.    You may use acetaminophen or ibuprofen to control pain, unless another pain medicine was prescribed. If you have chronic liver or kidney disease, talk with your healthcare provider before using these medicines. Also talk with your provider if you ve had a stomach ulcer or gastrointestinal bleeding.    Don t put creams or objects under the cast if you have itching.  Follow-up care  Follow up with your healthcare provider, or as advised. This is to make sure the bone is healing the way it should.  X-rays may be taken. You will be told of any new findings that may affect your care.  When to seek medical advice  Call your healthcare provider right away if any of these occur:    The plaster cast or splint becomes wet or soft    The cast or splint cracks    The fiberglass cast or splint stays wet for more than 24 hours    Pain or swelling gets worse    Redness, warmth, swelling, drainage from the wound, or foul odor from a cast or splint    Finger becomes more cold, blue, numb, or tingly    You can t move your finger    The skin around the cast or splint becomes red    Fever of 100.4 F (38 C) or higher, or as directed by your healthcare provider  Date Last Reviewed: 2/1/2017 2000-2018 The [employer]. [address] [place] [address], [place] [address]. All rights reserved. This information is not intended as a substitute for professional medical care. Always follow your healthcare professional's instructions.

## 2019-04-27 DIAGNOSIS — I10 HYPERTENSION GOAL BP (BLOOD PRESSURE) < 140/90: ICD-10-CM

## 2019-04-28 NOTE — TELEPHONE ENCOUNTER
"Requested Prescriptions   Pending Prescriptions Disp Refills     hydrochlorothiazide (MICROZIDE) 12.5 MG capsule [Pharmacy Med Name: HYDROCHLOROTHIAZIDE 12.5MG CAPSULES] 90 capsule 0     Sig: TAKE 1 CAPSULE BY MOUTH EVERY DAY         Last Written Prescription Date:  1/4/19  Last Fill Quantity: 90,   # refills: 0  Last Office Visit: 2/22/19  Future Office visit:       Routing refill request to provider for review/approval because:  BP not at goal -  visit - closed fracture of finger - when do you want to recheck potassium      Diuretics (Including Combos) Protocol Failed - 4/27/2019  4:13 PM        Failed - Blood pressure under 140/90 in past 12 months     BP Readings from Last 3 Encounters:   04/17/19 152/64   02/22/19 125/68   11/06/18 118/62     Your potassium is on the lower end.It can be from a blood pressure medication hydrochlorothiazide.  At this point we will stick with the same dose of medication but if potassium is persistently abnormal we may consider stopping hydrochlorothiazide in future as your blood pressure is okay.  I recommend you to add more of a green vegetables in your diet.             Failed - Normal serum potassium on file in past 12 months     Recent Labs   Lab Test 11/06/18  1124   POTASSIUM 3.2*                    Passed - Recent (12 mo) or future (30 days) visit within the authorizing provider's specialty     Patient had office visit in the last 12 months or has a visit in the next 30 days with authorizing provider or within the authorizing provider's specialty.  See \"Patient Info\" tab in inbasket, or \"Choose Columns\" in Meds & Orders section of the refill encounter.              Passed - Medication is active on med list        Passed - Patient is age 18 or older        Passed - No active pregancy on record        Passed - Normal serum creatinine on file in past 12 months     Recent Labs   Lab Test 11/06/18  1124   CR 0.88              Passed - Normal serum sodium on file in past 12 " months     Recent Labs   Lab Test 11/06/18  1124                 Passed - No positive pregnancy test in past 12 months

## 2019-04-29 RX ORDER — HYDROCHLOROTHIAZIDE 12.5 MG/1
CAPSULE ORAL
Qty: 90 CAPSULE | Refills: 0 | Status: SHIPPED | OUTPATIENT
Start: 2019-04-29 | End: 2019-05-03

## 2019-05-02 NOTE — TELEPHONE ENCOUNTER
S: Daughter called - at pharmacy with patient - consent on file - patient has not taken medication for 2+ weeks - BP check at pharmacy 182/92 - calling to request triage recommendation    B: Hx: stroke, aortic aneurysm, DMII, HTN, hypokalemia    HTN plan: hydrochlorothiazide (MICROZIDE) 12.5 MG capsule - once daily  losartan (COZAAR) 50 MG tablet - once daily    Last office visit 2/22/19 - Ever    A: Denies MI, Stroke, HTN symptoms at this time including chest pain, SOB, headache, blurry vision, tingling/numbness, nausea, facial drooping, slurred speech, one sided weakness    Patient is overdue for BMP recheck of potassium level      R: Plan:  Please restart blood pressure medication - office visit HTN f/u tomorrow with Ever - to ED for symptoms MI, Stroke, HTN - huddle with Ever - agrees with plan    Return call to daughter - home now - took BP medication right away at pharmacy - wrist band /113 - no symptoms still - I discussed provider plan give BP medication 1-2 hours to be effective and also allow patient to rest for 15 minutes - reviewed how to take blood pressure - discussed if BP appears unresolved - continues to be elevated, increasing, or starts to have symptoms please be seen in emergency department - daughter verbalized understanding and agrees with plan - reviewed FNA -return call with any questions    Michelle Soto RN

## 2019-05-03 ENCOUNTER — OFFICE VISIT (OUTPATIENT)
Dept: FAMILY MEDICINE | Facility: CLINIC | Age: 84
End: 2019-05-03
Payer: MEDICARE

## 2019-05-03 VITALS
HEART RATE: 64 BPM | RESPIRATION RATE: 16 BRPM | BODY MASS INDEX: 27.21 KG/M2 | OXYGEN SATURATION: 94 % | TEMPERATURE: 98.6 F | WEIGHT: 144 LBS | DIASTOLIC BLOOD PRESSURE: 46 MMHG | SYSTOLIC BLOOD PRESSURE: 133 MMHG

## 2019-05-03 DIAGNOSIS — E87.6 HYPOKALEMIA: ICD-10-CM

## 2019-05-03 DIAGNOSIS — E78.5 HYPERLIPIDEMIA LDL GOAL <100: ICD-10-CM

## 2019-05-03 DIAGNOSIS — I10 HYPERTENSION GOAL BP (BLOOD PRESSURE) < 140/90: Primary | ICD-10-CM

## 2019-05-03 DIAGNOSIS — E11.9 TYPE 2 DIABETES MELLITUS WITHOUT COMPLICATION, WITHOUT LONG-TERM CURRENT USE OF INSULIN (H): ICD-10-CM

## 2019-05-03 LAB
ALBUMIN SERPL-MCNC: 3.8 G/DL (ref 3.4–5)
ALP SERPL-CCNC: 52 U/L (ref 40–150)
ALT SERPL W P-5'-P-CCNC: 20 U/L (ref 0–50)
ANION GAP SERPL CALCULATED.3IONS-SCNC: 9 MMOL/L (ref 3–14)
AST SERPL W P-5'-P-CCNC: 27 U/L (ref 0–45)
BILIRUB SERPL-MCNC: 0.6 MG/DL (ref 0.2–1.3)
BUN SERPL-MCNC: 20 MG/DL (ref 7–30)
CALCIUM SERPL-MCNC: 9.3 MG/DL (ref 8.5–10.1)
CHLORIDE SERPL-SCNC: 104 MMOL/L (ref 94–109)
CHOLEST SERPL-MCNC: 128 MG/DL
CO2 SERPL-SCNC: 25 MMOL/L (ref 20–32)
CREAT SERPL-MCNC: 0.79 MG/DL (ref 0.52–1.04)
GFR SERPL CREATININE-BSD FRML MDRD: 65 ML/MIN/{1.73_M2}
GLUCOSE SERPL-MCNC: 141 MG/DL (ref 70–99)
HBA1C MFR BLD: 5.3 % (ref 0–5.6)
HDLC SERPL-MCNC: 38 MG/DL
LDLC SERPL CALC-MCNC: 69 MG/DL
NONHDLC SERPL-MCNC: 90 MG/DL
POTASSIUM SERPL-SCNC: 4 MMOL/L (ref 3.4–5.3)
PROT SERPL-MCNC: 6.9 G/DL (ref 6.8–8.8)
SODIUM SERPL-SCNC: 138 MMOL/L (ref 133–144)
TRIGL SERPL-MCNC: 103 MG/DL

## 2019-05-03 PROCEDURE — 36415 COLL VENOUS BLD VENIPUNCTURE: CPT | Performed by: FAMILY MEDICINE

## 2019-05-03 PROCEDURE — 80053 COMPREHEN METABOLIC PANEL: CPT | Performed by: FAMILY MEDICINE

## 2019-05-03 PROCEDURE — 80061 LIPID PANEL: CPT | Performed by: FAMILY MEDICINE

## 2019-05-03 PROCEDURE — 83036 HEMOGLOBIN GLYCOSYLATED A1C: CPT | Performed by: FAMILY MEDICINE

## 2019-05-03 PROCEDURE — 99214 OFFICE O/P EST MOD 30 MIN: CPT | Performed by: FAMILY MEDICINE

## 2019-05-03 NOTE — LETTER
[facility]  [address] [place] [id number] [phone]        May 8, 2019    Reanna Garza                                                                                                                 [address] [facility] [id number]        Dear Parminder Forte!  It was a pleasure to see you in clinic!  Thank you for getting labs done. Everything looks normal, which is good news.   The testing of your blood sugar, kidney function, liver function and electrolytes was normal.   Your cholesterol is at goal.   Your HgA1C, also called glycosylated hemoglobin, which measures the level of sugar in your blood over the past few months, is at goal, which is great! Congratulations!     Great job!     Results for orders placed or performed in visit on 05/03/19   COMPREHENSIVE METABOLIC PANEL   Result Value Ref Range    Sodium 138 133 - 144 mmol/L    Potassium 4.0 3.4 - 5.3 mmol/L    Chloride 104 94 - 109 mmol/L    Carbon Dioxide 25 20 - 32 mmol/L    Anion Gap 9 3 - 14 mmol/L    Glucose 141 (H) 70 - 99 mg/dL    Urea Nitrogen 20 7 - 30 mg/dL    Creatinine 0.79 0.52 - 1.04 mg/dL    GFR Estimate 65 >60 mL/min/[1.73_m2]    GFR Estimate If Black 75 >60 mL/min/[1.73_m2]    Calcium 9.3 8.5 - 10.1 mg/dL    Bilirubin Total 0.6 0.2 - 1.3 mg/dL    Albumin 3.8 3.4 - 5.0 g/dL    Protein Total 6.9 6.8 - 8.8 g/dL    Alkaline Phosphatase 52 40 - 150 U/L    ALT 20 0 - 50 U/L    AST 27 0 - 45 U/L   Lipid panel reflex to direct LDL Fasting   Result Value Ref Range    Cholesterol 128 <200 mg/dL    Triglycerides 103 <150 mg/dL    HDL Cholesterol 38 (L) >49 mg/dL    LDL Cholesterol Calculated 69 <100 mg/dL    Non HDL Cholesterol 90 <130 mg/dL   Hemoglobin A1c   Result Value Ref Range    Hemoglobin A1C 5.3 0 - 5.6 %     Sincerely,       Chelita Curtis MD

## 2019-05-03 NOTE — PROGRESS NOTES
SUBJECTIVE:   Reanna Garza is a 89 year old female who presents to clinic today for the following   health issues:     Caretaker (one of her 4 daughters, now no longer living with her) was withholding medication for two weeks after receiving lab results and being informed she needed to schedule a follow up for her mother in order to receive refills.    Patient's daughter Marlene is asking about times medications should be taken specifically hydrochlorothiazide and lipitor.    Diabetes Follow-up      Patient is checking blood sugars: yes, but other sister hasn't shared results with Marlene    Diabetic concerns: None     Symptoms of hypoglycemia (low blood sugar): none     Paresthesias (numbness or burning in feet) or sores: No     D    BP Readings from Last 2 Encounters:   05/03/19 133/46   04/17/19 152/64     Hemoglobin A1C (%)   Date Value   02/22/2019 5.9 (H)   11/06/2018 5.7 (H)     LDL Cholesterol Calculated (mg/dL)   Date Value   11/06/2018 84   06/27/2018 58       Diabetes Management Resources  Hyperlipidemia Follow-Up      Rate your low fat/cholesterol diet?: good    Taking statin?  Yes, no side effects    Other lipid medications/supplements?:  none      Hypertension Follow-up      Outpatient blood pressures are being checked at home starting 05/02/2019.  Results are 182/92 189/111 189/102 156/85.    Low Salt Diet: not monitoring salt    She had not been taking hydrochlorothiazide over the past few weeks because of worries about potassium levels    Her daughter is worried that taking all of the blood pressure meds were taken in the morning, so now has been taking hztz in the morning and losartan at night  BP Readings from Last 3 Encounters:   05/03/19 133/46   04/17/19 152/64   02/22/19 125/68        Potassium   Date Value Ref Range Status   11/06/2018 3.2 (L) 3.4 - 5.3 mmol/L Final       Amount of exercise or physical activity: None    Problems taking medications regularly: No    Medication side effects:  none    Diet: carbohydrate counting      Patient Active Problem List   Diagnosis     Abdominal aortic aneurysm (H)     Restless legs syndrome (RLS)     Abnormality of gait     Imbalance     HYPERLIPIDEMIA LDL GOAL <100     Low back pain     Lumbar spondylosis     Right hip pain     Trochanteric bursitis     Hypertension goal BP (blood pressure) < 140/90     Shoulder pain     Disturbance of skin sensation     Macular degeneration (senile) of retina     Chronic rhinitis     Stroke (H)     Vitamin D deficiency     Advanced care planning/counseling discussion     Type 2 diabetes mellitus without complication (H)     Confusion     History of TIA (transient ischemic attack) and stroke     Vascular dementia without behavioral disturbance     Hypokalemia     Past Surgical History:   Procedure Laterality Date     BLEPHAROPLASTY, BROW LIFT, COMBINED  6/10/2013    Procedure: COMBINED BLEPHAROPLASTY, BROW LIFT;  BILATERAL BLEPHAROPLASTY WITH INTERNAL BROW LIFT;  Surgeon: Suhas, Chip HERNANDEZ MD;  Location:  [facility]     C CHOLECYSTOENTEROSTOMY      thaddeus     C TOTAL ABDOM HYSTERECTOMY       C TOTAL KNEE ARTHROPLASTY  6/04    x2     EXTRACAPSULAR CATARACT EXTRATION WITH INTRAOCULAR LENS IMPLANT      both eyes     HC SHOULDER ARTHROSCOPY, DX      rotator cuff tear repair     REPLACEMENT SOCKET, SHOULDER DISARTICULATION/INTERSCAPULAR THORACIC, MOLDED TO      right       Social History     Tobacco Use     Smoking status: Former Smoker     Last attempt to quit: 1999     Years since quittin.4     Smokeless tobacco: Never Used   Substance Use Topics     Alcohol use: Yes     Comment: rare     Family History   Problem Relation Age of Onset     Cancer Brother      Cancer Sister      Breast Cancer Sister      Cancer Sister      Cancer Sister      Cancer Sister      Alzheimer Disease Sister          Allergies   Allergen Reactions     Codeine      Hives       Codeine Sulfate Hives     Penicillins Hives     Strawberry Hives      Recent Labs   Lab Test 02/22/19  1151 11/06/18  1124 08/07/18  1107 06/27/18  1051 03/29/18  1339 01/09/18  1452 10/11/17  1410  05/26/15  1055   A1C 5.9* 5.7*  --   --  5.7 6.3* 5.7   < > 5.9   LDL  --  84  --  58  --  87  --    < >  --    HDL  --  37*  --  35*  --  32*  --    < >  --    TRIG  --  170*  --  261*  --  284*  --    < >  --    ALT  --  25  --  24  --  19 18   < >  --    CR  --  0.88 0.89 0.82  --  0.70 0.79   < > 0.71   GFRESTIMATED  --  61 59* 66  --  78 69   < > 78   GFRESTBLACK  --  73 72 80  --  >90 83   < > >90   GFR Calc     POTASSIUM  --  3.2* 3.4 3.2*  --  3.4 3.5   < > 3.0*   TSH  --   --   --   --   --   --  2.29  --  1.29    < > = values in this interval not displayed.      BP Readings from Last 3 Encounters:   05/03/19 133/46   04/17/19 152/64   02/22/19 125/68    Wt Readings from Last 3 Encounters:   05/03/19 65.3 kg (144 lb)   02/22/19 68 kg (150 lb)   11/06/18 70 kg (154 lb 4 oz)                    ROS:  Constitutional, HEENT, cardiovascular, pulmonary, GI, , musculoskeletal, neuro, skin, endocrine and psych systems are negative, except as otherwise noted.    OBJECTIVE:     /46 (BP Location: Left arm, Patient Position: Sitting, Cuff Size: Adult Regular)   Pulse 64   Temp 98.6  F (37  C) (Tympanic)   Resp 16   Wt 65.3 kg (144 lb)   SpO2 94%   Breastfeeding? No   BMI 27.21 kg/m    Body mass index is 27.21 kg/m .  GENERAL: healthy, alert and no distress, smiling  Accompanied by daughter Marlene who provides most of the history  NECK: no adenopathy, no asymmetry, masses, or scars, thyroid normal to palpation and no carotid bruits  RESP: lungs clear to auscultation - no rales, rhonchi or wheezes  CV: regular rate and rhythm, normal S1 S2, no S3 or S4, no murmur, click or rub, no peripheral edema and peripheral pulses strong  Neuro:  Pupils equal, round, reactive to light.    Gait: she ade from a chair with some difficulty and has a mildly broad-based gait,  uses 4- legged walker    ASSESSMENT/PLAN:     1. Hypertension goal BP (blood pressure) < 140/90  BP Readings from Last 3 Encounters:   05/03/19 133/46   04/17/19 152/64   02/22/19 125/68    at goal on medication  - COMPREHENSIVE METABOLIC PANEL    2. Hypokalemia  Recheck today and Will fu as indicated.   - COMPREHENSIVE METABOLIC PANEL    3. Hyperlipidemia LDL goal <100  LDL Cholesterol Calculated   Date Value Ref Range Status   11/06/2018 84 <100 mg/dL Final     Comment:     Desirable:       <100 mg/dl    at goal on statin  - Lipid panel reflex to direct LDL Fasting    4. Type 2 diabetes mellitus without complication, without long-term current use of insulin (H)  At goal keep up good work  - Hemoglobin A1c    Return to clinic 6 months for follow up chronic disease management    Chelita Curtis MD  [facility]

## 2019-05-03 NOTE — LETTER
May 8, 2019      Reanna ROACH Greg  [address] [place] [id number]        Dear ,    We are writing to inform you of your test results.    Hello!  It was a pleasure to see you in clinic!  Thank you for getting labs done. Everything looks normal, which is good news.     The testing of your blood sugar, kidney function, liver function and electrolytes was normal.     Your cholesterol is at goal.     Your HgA1C, also called glycosylated hemoglobin, which measures the level of sugar in your blood over the past few months, is at goal, which is great! Congratulations!     Resulted Orders   COMPREHENSIVE METABOLIC PANEL   Result Value Ref Range    Sodium 138 133 - 144 mmol/L    Potassium 4.0 3.4 - 5.3 mmol/L    Chloride 104 94 - 109 mmol/L    Carbon Dioxide 25 20 - 32 mmol/L    Anion Gap 9 3 - 14 mmol/L    Glucose 141 (H) 70 - 99 mg/dL      Comment:      Non Fasting    Urea Nitrogen 20 7 - 30 mg/dL    Creatinine 0.79 0.52 - 1.04 mg/dL    GFR Estimate 65 >60 mL/min/[1.73_m2]      Comment:      Non  GFR Calc  Starting 12/18/2018, serum creatinine based estimated GFR (eGFR) will be   calculated using the Chronic Kidney Disease Epidemiology Collaboration   (CKD-EPI) equation.      GFR Estimate If Black 75 >60 mL/min/[1.73_m2]      Comment:       GFR Calc  Starting 12/18/2018, serum creatinine based estimated GFR (eGFR) will be   calculated using the Chronic Kidney Disease Epidemiology Collaboration   (CKD-EPI) equation.      Calcium 9.3 8.5 - 10.1 mg/dL    Bilirubin Total 0.6 0.2 - 1.3 mg/dL    Albumin 3.8 3.4 - 5.0 g/dL    Protein Total 6.9 6.8 - 8.8 g/dL    Alkaline Phosphatase 52 40 - 150 U/L    ALT 20 0 - 50 U/L    AST 27 0 - 45 U/L   Lipid panel reflex to direct LDL Fasting   Result Value Ref Range    Cholesterol 128 <200 mg/dL    Triglycerides 103 <150 mg/dL      Comment:      Non Fasting    HDL Cholesterol 38 (L) >49 mg/dL    LDL Cholesterol Calculated 69  <100 mg/dL      Comment:      Desirable:       <100 mg/dl    Non HDL Cholesterol 90 <130 mg/dL   Hemoglobin A1c   Result Value Ref Range    Hemoglobin A1C 5.3 0 - 5.6 %      Comment:      Normal <5.7% Prediabetes 5.7-6.4%  Diabetes 6.5% or higher - adopted from ADA   consensus guidelines.         If you have any questions or concerns, please call the clinic at the number listed above.       Sincerely,        Chelita Curtis MD/nr

## 2019-05-06 DIAGNOSIS — E78.5 HYPERLIPIDEMIA LDL GOAL <100: ICD-10-CM

## 2019-05-06 DIAGNOSIS — E11.9 TYPE 2 DIABETES MELLITUS WITHOUT COMPLICATION, WITHOUT LONG-TERM CURRENT USE OF INSULIN (H): ICD-10-CM

## 2019-05-06 DIAGNOSIS — I10 HYPERTENSION GOAL BP (BLOOD PRESSURE) < 140/90: ICD-10-CM

## 2019-05-06 NOTE — TELEPHONE ENCOUNTER
"Requested Prescriptions   Pending Prescriptions Disp Refills     glipiZIDE (GLUCOTROL) 5 MG tablet 180 tablet 0     Sig: Take 1 tablet (5 mg) by mouth 2 times daily (before meals) You must take this medication with a meal that includes protein and a whole grain  Last Written Prescription Date:  2/25/2019  Last Fill Quantity: 180 tablet,  # refills: 0   Last Office Visit: 5/3/2019   Future Office Visit:            Sulfonylurea Agents Passed - 5/6/2019  4:21 PM        Passed - Blood pressure less than 140/90 in past 6 months     BP Readings from Last 3 Encounters:   05/03/19 133/46   04/17/19 152/64   02/22/19 125/68           Passed - Patient has documented LDL within the past 12 mos.     Recent Labs   Lab Test 05/03/19  1232   LDL 69           Passed - Patient has had a Microalbumin in the past 12 mos.     Recent Labs   Lab Test 02/22/19  1152   MICROL 14   UMALCR 26.48*           Passed - Patient has documented A1c within the specified period of time.     If HgbA1C is 8 or greater, it needs to be on file within the past 3 months.  If less than 8, must be on file within the past 6 months.     Recent Labs   Lab Test 05/03/19  1232   A1C 5.3           Passed - Medication is active on med list        Passed - Patient is age 18 or older        Passed - No active pregnancy on record        Passed - Patient has a recent creatinine (normal) within the past 12 mos.     Recent Labs   Lab Test 05/03/19  1232   CR 0.79           Passed - Patient has not had a positive pregnancy test within the past 12 mos.        Passed - Recent (6 mo) or future (30 days) visit within the authorizing provider's specialty     Patient had office visit in the last 6 months or has a visit in the next 30 days with authorizing provider or within the authorizing provider's specialty.  See \"Patient Info\" tab in inbasket, or \"Choose Columns\" in Meds & Orders section of the refill encounter.     " "    __________________________________________________________________________________________     losartan (COZAAR) 50 MG tablet 90 tablet 2     Sig: Take 1 tablet (50 mg) by mouth daily  Last Written Prescription Date:  10/3/2018  Last Fill Quantity: 90 tablet,  # refills: 2   Last Office Visit: 5/3/2019   Future Office Visit:            Angiotensin-II Receptors Passed - 5/6/2019  4:21 PM        Passed - Blood pressure under 140/90 in past 12 months     BP Readings from Last 3 Encounters:   05/03/19 133/46   04/17/19 152/64   02/22/19 125/68           Passed - Recent (12 mo) or future (30 days) visit within the authorizing provider's specialty     Patient had office visit in the last 12 months or has a visit in the next 30 days with authorizing provider or within the authorizing provider's specialty.  See \"Patient Info\" tab in inbasket, or \"Choose Columns\" in Meds & Orders section of the refill encounter.            Passed - Medication is active on med list        Passed - Patient is age 18 or older        Passed - No active pregnancy on record        Passed - Normal serum creatinine on file in past 12 months     Recent Labs   Lab Test 05/03/19  1232   CR 0.79           Passed - Normal serum potassium on file in past 12 months     Recent Labs   Lab Test 05/03/19  1232   POTASSIUM 4.0            Passed - No positive pregnancy test in past 12 months     __________________________________________________________________________________________       lovastatin (MEVACOR) 40 MG tablet 90 tablet 3     Sig: Take 1 tablet (40 mg) by mouth At Bedtime  Last Written Prescription Date:  11/6/2018  Last Fill Quantity: 90 tablet,  # refills: 3   Last Office Visit: 5/3/2019   Future Office Visit:            Statins Protocol Passed - 5/6/2019  4:21 PM        Passed - LDL on file in past 12 months     Recent Labs   Lab Test 05/03/19  1232   LDL 69           Passed - No abnormal creatine kinase in past 12 months     No lab results " "found.           Passed - Recent (12 mo) or future (30 days) visit within the authorizing provider's specialty     Patient had office visit in the last 12 months or has a visit in the next 30 days with authorizing provider or within the authorizing provider's specialty.  See \"Patient Info\" tab in inbasket, or \"Choose Columns\" in Meds & Orders section of the refill encounter.            Passed - Medication is active on med list        Passed - Patient is age 18 or older        Passed - No active pregnancy on record        Passed - No positive pregnancy test in past 12 months     __________________________________________________________________________________________       blood glucose (ACCU-CHEK PATTI PLUS) test strip 400 strip 3     Sig: USE AS DIRECTED TO TEST BLOOD SUGAR FOUR TIMES DAILY OR AS DIRECTED  Last Written Prescription Date:  2/24/2017  Last Fill Quantity: 1,  # refills: 0   Last Office Visit: 5/3/2019   Future Office Visit:            Diabetic Supplies Protocol Passed - 5/6/2019  4:21 PM        Passed - Medication is active on med list        Passed - Patient is 18 years of age or older        Passed - Recent (6 mo) or future (30 days) visit within the authorizing provider's specialty     Patient had office visit in the last 6 months or has a visit in the next 30 days with authorizing provider.  See \"Patient Info\" tab in inbasket, or \"Choose Columns\" in Meds & Orders section of the refill encounter.              "

## 2019-05-07 RX ORDER — GLIPIZIDE 5 MG/1
5 TABLET ORAL
Qty: 180 TABLET | Refills: 1 | Status: SHIPPED | OUTPATIENT
Start: 2019-05-07 | End: 2020-01-01

## 2019-05-07 RX ORDER — LOVASTATIN 40 MG
40 TABLET ORAL AT BEDTIME
Qty: 0.01 TABLET | Refills: 0 | OUTPATIENT
Start: 2019-05-07

## 2019-05-07 RX ORDER — LOSARTAN POTASSIUM 50 MG/1
50 TABLET ORAL DAILY
Qty: 0.1 TABLET | Refills: 0 | OUTPATIENT
Start: 2019-05-07

## 2019-05-07 NOTE — RESULT ENCOUNTER NOTE
Hello!  It was a pleasure to see you in clinic!  Thank you for getting labs done. Everything looks normal, which is good news.     The testing of your blood sugar, kidney function, liver function and electrolytes was normal.     Your cholesterol is at goal.    Your HgA1C, also called glycosylated hemoglobin, which measures the level of sugar in your blood over the past few months, is at goal, which is great! Congratulations!    Great job!    Sincerely,  Dr. Chelita Curtis MD  5/7/2019

## 2019-05-08 NOTE — TELEPHONE ENCOUNTER
Signed Prescriptions:                        Disp   Refills    glipiZIDE (GLUCOTROL) 5 MG tablet          180 ta*1        Sig: Take 1 tablet (5 mg) by mouth 2 times daily (before           meals) You must take this medication with a meal           that includes protein and a whole grain  Authorizing Provider: TAYLOR MONTEZ  Ordering User: JUDI MOBLEY    blood glucose (ACCU-CHEK PATTI PLUS) test *400 st*1        Sig: USE AS DIRECTED TO TEST BLOOD SUGAR FOUR TIMES DAILY           OR AS DIRECTED  Authorizing Provider: TAYLOR MONTEZ  Ordering User: JUDI MOBLEY    Refused Prescriptions:                       Disp   Refills    losartan (COZAAR) 50 MG tablet             0.1 ta*0        Sig: Take 1 tablet (50 mg) by mouth daily  Refused By: JUDI MOBLEY  Reason for Refusal: Duplicate    lovastatin (MEVACOR) 40 MG tablet          0.01 t*0        Sig: Take 1 tablet (40 mg) by mouth At Bedtime  Refused By: JUDI MOBLEY  Reason for Refusal: Duplicate

## 2019-05-10 ENCOUNTER — TELEPHONE (OUTPATIENT)
Dept: FAMILY MEDICINE | Facility: CLINIC | Age: 84
End: 2019-05-10

## 2019-05-10 DIAGNOSIS — E11.9 TYPE 2 DIABETES MELLITUS WITHOUT COMPLICATION, WITHOUT LONG-TERM CURRENT USE OF INSULIN (H): ICD-10-CM

## 2019-05-10 NOTE — TELEPHONE ENCOUNTER
Pt is not on insulin.  Pended the order changed to once daily.  Sending to pcp to sign.  TA Jansen

## 2019-05-10 NOTE — TELEPHONE ENCOUNTER
Alternative Requested: Re: blood glucose (ACCU-CHEK PATTI PLUS) test strip    Pharmacy Comments: Alternative requested: [facility] can only bill per medicare standard utilization guidelines. 3 times daily max for insulin dependent patients. Once daily for non-insulin dependent. Please send new RX.

## 2019-05-16 ENCOUNTER — TELEPHONE (OUTPATIENT)
Dept: FAMILY MEDICINE | Facility: CLINIC | Age: 84
End: 2019-05-16

## 2019-05-16 DIAGNOSIS — I10 HYPERTENSION GOAL BP (BLOOD PRESSURE) < 140/90: ICD-10-CM

## 2019-05-16 RX ORDER — LOSARTAN POTASSIUM 50 MG/1
50 TABLET ORAL DAILY
Qty: 90 TABLET | Refills: 0 | Status: SHIPPED | OUTPATIENT
Start: 2019-05-16 | End: 2019-07-20

## 2019-05-16 NOTE — TELEPHONE ENCOUNTER
Per daughter they had to move prescriptions from [facility] to [facility] due to insurance.  Patient should have 1 refill on her Losartan left at [facility] but daughter was told no.  I did send a 90 day supply to [facility] per previous prescription.  Nayana Larkin RN

## 2019-07-20 DIAGNOSIS — I10 HYPERTENSION GOAL BP (BLOOD PRESSURE) < 140/90: ICD-10-CM

## 2019-07-20 RX ORDER — LOSARTAN POTASSIUM 50 MG/1
TABLET ORAL
Qty: 90 TABLET | Refills: 2 | Status: SHIPPED | OUTPATIENT
Start: 2019-07-20 | End: 2020-01-01

## 2019-07-20 NOTE — TELEPHONE ENCOUNTER
"Requested Prescriptions   Pending Prescriptions Disp Refills     losartan (COZAAR) 50 MG tablet [Pharmacy Med Name: LOSARTAN POTASSIUM 50 MG TAB] 90 tablet 0     Sig: TAKE 1 TABLET BY MOUTH EVERY DAY       Angiotensin-II Receptors Passed - 7/20/2019 12:57 PM        Passed - Blood pressure under 140/90 in past 12 months     BP Readings from Last 3 Encounters:   05/03/19 133/46   04/17/19 152/64   02/22/19 125/68                 Passed - Recent (12 mo) or future (30 days) visit within the authorizing provider's specialty     Patient had office visit in the last 12 months or has a visit in the next 30 days with authorizing provider or within the authorizing provider's specialty.  See \"Patient Info\" tab in inbasket, or \"Choose Columns\" in Meds & Orders section of the refill encounter.              Passed - Medication is active on med list        Passed - Patient is age 18 or older        Passed - No active pregnancy on record        Passed - Normal serum creatinine on file in past 12 months     Recent Labs   Lab Test 05/03/19  1232   CR 0.79             Passed - Normal serum potassium on file in past 12 months     Recent Labs   Lab Test 05/03/19  1232   POTASSIUM 4.0                    Passed - No positive pregnancy test in past 12 months        Signed Prescriptions:                        Disp   Refills    losartan (COZAAR) 50 MG tablet             90 tab*2        Sig: TAKE 1 TABLET BY MOUTH EVERY DAY  Authorizing Provider: TAYLOR MONTEZ  Ordering User: JUDI MOBLEY      "

## 2019-07-22 DIAGNOSIS — E11.9 TYPE 2 DIABETES MELLITUS WITHOUT COMPLICATION, WITHOUT LONG-TERM CURRENT USE OF INSULIN (H): ICD-10-CM

## 2019-07-22 NOTE — TELEPHONE ENCOUNTER
"Requested Prescriptions   Pending Prescriptions Disp Refills     blood glucose monitoring (ACCU-CHEK MULTICLIX) lancets 400 each 3     Sig: Use to test blood sugar 4 times daily.  Last Written Prescription Date:  02/22/2019  Last Fill Quantity: 400 each,  # refills: 3   Last Office Visit: 5/3/2019 Ever  Future Office Visit:            Diabetic Supplies Protocol Passed - 7/22/2019  9:09 AM        Passed - Medication is active on med list        Passed - Patient is 18 years of age or older        Passed - Recent (6 mo) or future (30 days) visit within the authorizing provider's specialty     Patient had office visit in the last 6 months or has a visit in the next 30 days with authorizing provider.  See \"Patient Info\" tab in inbasket, or \"Choose Columns\" in Meds & Orders section of the refill encounter.            "

## 2019-07-22 NOTE — TELEPHONE ENCOUNTER
Refused Prescriptions:                       Disp   Refills    blood glucose monitoring (ACCU-CHEK MULTIC*0.1 ea*0        Sig: Use to test blood sugar 4 times daily.  Refused By: JUDI MOBLEY  Reason for Refusal: Duplicate

## 2019-08-05 DIAGNOSIS — I10 HYPERTENSION GOAL BP (BLOOD PRESSURE) < 140/90: ICD-10-CM

## 2019-08-05 NOTE — TELEPHONE ENCOUNTER
***      Last Written Prescription Date:  ***  Last Fill Quantity: ***,   # refills: ***  Last Office Visit: ***  Future Office visit:       Routing refill request to provider for review/approval because:  {RX Non-Protocol:[id number]}

## 2019-08-05 NOTE — TELEPHONE ENCOUNTER
"Requested Prescriptions   Pending Prescriptions Disp Refills     hydrochlorothiazide (MICROZIDE) 12.5 MG capsule 90 capsule 0     Sig: TAKE 1 CAPSULE BY MOUTH EVERY DAY  Last Written Prescription Date:  1/4/2019  Last Fill Quantity: 90 capsule,  # refills: 0   Last Office Visit: 5/3/2019   Future Office Visit:            Diuretics (Including Combos) Protocol Passed - 8/5/2019  1:24 PM        Passed - Blood pressure under 140/90 in past 12 months     BP Readings from Last 3 Encounters:   05/03/19 133/46   04/17/19 152/64   02/22/19 125/68           Passed - Recent (12 mo) or future (30 days) visit within the authorizing provider's specialty     Patient had office visit in the last 12 months or has a visit in the next 30 days with authorizing provider or within the authorizing provider's specialty.  See \"Patient Info\" tab in inbasket, or \"Choose Columns\" in Meds & Orders section of the refill encounter.            Passed - Medication is active on med list        Passed - Patient is age 18 or older        Passed - No active pregancy on record        Passed - Normal serum creatinine on file in past 12 months     Recent Labs   Lab Test 05/03/19  1232   CR 0.79            Passed - Normal serum potassium on file in past 12 months     Recent Labs   Lab Test 05/03/19  1232   POTASSIUM 4.0            Passed - Normal serum sodium on file in past 12 months     Recent Labs   Lab Test 05/03/19  1232               Passed - No positive pregnancy test in past 12 months          "

## 2019-08-07 DIAGNOSIS — I10 HYPERTENSION GOAL BP (BLOOD PRESSURE) < 140/90: ICD-10-CM

## 2019-08-07 RX ORDER — HYDROCHLOROTHIAZIDE 12.5 MG/1
CAPSULE ORAL
Qty: 90 CAPSULE | Refills: 2 | Status: SHIPPED | OUTPATIENT
Start: 2019-08-07 | End: 2020-01-01

## 2019-08-07 RX ORDER — HYDROCHLOROTHIAZIDE 12.5 MG/1
CAPSULE ORAL
Qty: 90 CAPSULE | Refills: 0 | Status: SHIPPED | OUTPATIENT
Start: 2019-08-07 | End: 2020-01-01

## 2019-08-07 NOTE — TELEPHONE ENCOUNTER
Team coordinators-Please inform patient's daughter medication was refilled.    Prescription approved per [facility] Refill Protocol.      Thank you!  DEV BeckettN, RN

## 2019-08-07 NOTE — TELEPHONE ENCOUNTER
Patient's daughter Vanessa is calling to check on the status of this request. Patient will be completely out by today. Please advise, thank you!    Vanessa # [phone]    *Consent on file*

## 2019-08-07 NOTE — TELEPHONE ENCOUNTER
"Requested Prescriptions   Pending Prescriptions Disp Refills     hydrochlorothiazide (MICROZIDE) 12.5 MG capsule 90 capsule 0     Sig: TAKE 1 CAPSULE BY MOUTH EVERY DAY  Last Written Prescription Date:  1/4/2019  Last Fill Quantity: 90 capsule,  # refills: 0   Last Office Visit: 5/3/2019   Future Office Visit:            Diuretics (Including Combos) Protocol Passed - 8/7/2019 11:14 AM        Passed - Blood pressure under 140/90 in past 12 months     BP Readings from Last 3 Encounters:   05/03/19 133/46   04/17/19 152/64   02/22/19 125/68           Passed - Recent (12 mo) or future (30 days) visit within the authorizing provider's specialty     Patient had office visit in the last 12 months or has a visit in the next 30 days with authorizing provider or within the authorizing provider's specialty.  See \"Patient Info\" tab in inbasket, or \"Choose Columns\" in Meds & Orders section of the refill encounter.            Passed - Medication is active on med list        Passed - Patient is age 18 or older        Passed - No active pregancy on record        Passed - Normal serum creatinine on file in past 12 months     Recent Labs   Lab Test 05/03/19  1232   CR 0.79            Passed - Normal serum potassium on file in past 12 months     Recent Labs   Lab Test 05/03/19  1232   POTASSIUM 4.0            Passed - Normal serum sodium on file in past 12 months     Recent Labs   Lab Test 05/03/19  1232               Passed - No positive pregnancy test in past 12 months          "

## 2019-08-08 NOTE — TELEPHONE ENCOUNTER
Signed Prescriptions:                        Disp   Refills    hydrochlorothiazide (MICROZIDE) 12.5 MG ca*90 cap*2        Sig: TAKE 1 CAPSULE BY MOUTH EVERY DAY  Authorizing Provider: TAYLOR MONTEZ  Ordering User: JUDI MOBLEY

## 2019-11-18 NOTE — TELEPHONE ENCOUNTER
Reason for Call:  Other call back    Detailed comments: Marlene(does have consent to communicate file on states she was told pt had no refills, says its hard to get pt in and needs bp meds but confusion with pharmacy and qty of refills would like to discuss. Please advise.     Phone Number Patient can be reached at: Cell number on file:    No relevant phone numbers on file.    or Other phone number:  [phone]  Best Time: asap    Can we leave a detailed message on this number? YES    Call taken on 11/18/2019 at 2:53 PM by Khadra Mckeon

## 2019-11-18 NOTE — TELEPHONE ENCOUNTER
JCARLOS Naranjo to call [facility] clinic back.  We will need to know what meds you are referring to.  TA Jansen

## 2020-01-01 ENCOUNTER — VIRTUAL VISIT (OUTPATIENT)
Dept: FAMILY MEDICINE | Facility: CLINIC | Age: 85
End: 2020-01-01
Payer: COMMERCIAL

## 2020-01-01 ENCOUNTER — APPOINTMENT (OUTPATIENT)
Dept: PHYSICAL THERAPY | Facility: CLINIC | Age: 85
End: 2020-01-01
Attending: INTERNAL MEDICINE
Payer: COMMERCIAL

## 2020-01-01 ENCOUNTER — HOSPITAL LABORATORY (OUTPATIENT)
Dept: OTHER | Facility: CLINIC | Age: 85
End: 2020-01-01

## 2020-01-01 ENCOUNTER — HOSPITAL ENCOUNTER (EMERGENCY)
Facility: CLINIC | Age: 85
Discharge: HOME | End: 2020-03-24
Attending: EMERGENCY MEDICINE | Admitting: EMERGENCY MEDICINE
Payer: COMMERCIAL

## 2020-01-01 ENCOUNTER — PATIENT OUTREACH (OUTPATIENT)
Dept: CARE COORDINATION | Facility: CLINIC | Age: 85
End: 2020-01-01

## 2020-01-01 ENCOUNTER — NURSING HOME VISIT (OUTPATIENT)
Dept: GERIATRICS | Facility: CLINIC | Age: 85
End: 2020-01-01
Payer: COMMERCIAL

## 2020-01-01 ENCOUNTER — TELEPHONE (OUTPATIENT)
Dept: FAMILY MEDICINE | Facility: CLINIC | Age: 85
End: 2020-01-01

## 2020-01-01 ENCOUNTER — HOSPITAL ENCOUNTER (EMERGENCY)
Facility: CLINIC | Age: 85
Discharge: NURSING, REHAB OR LONG-TERM CARE | End: 2020-10-22
Attending: EMERGENCY MEDICINE | Admitting: EMERGENCY MEDICINE
Payer: COMMERCIAL

## 2020-01-01 ENCOUNTER — NURSE TRIAGE (OUTPATIENT)
Dept: NURSING | Facility: CLINIC | Age: 85
End: 2020-01-01

## 2020-01-01 ENCOUNTER — MEDICAL CORRESPONDENCE (OUTPATIENT)
Dept: HEALTH INFORMATION MANAGEMENT | Facility: CLINIC | Age: 85
End: 2020-01-01

## 2020-01-01 ENCOUNTER — VIRTUAL VISIT (OUTPATIENT)
Dept: GERIATRICS | Facility: CLINIC | Age: 85
End: 2020-01-01
Payer: COMMERCIAL

## 2020-01-01 ENCOUNTER — APPOINTMENT (OUTPATIENT)
Dept: PHYSICAL THERAPY | Facility: CLINIC | Age: 85
End: 2020-01-01
Attending: PHYSICIAN ASSISTANT
Payer: COMMERCIAL

## 2020-01-01 ENCOUNTER — APPOINTMENT (OUTPATIENT)
Dept: GENERAL RADIOLOGY | Facility: CLINIC | Age: 85
End: 2020-01-01
Attending: EMERGENCY MEDICINE
Payer: COMMERCIAL

## 2020-01-01 ENCOUNTER — APPOINTMENT (OUTPATIENT)
Dept: GENERAL RADIOLOGY | Facility: CLINIC | Age: 85
End: 2020-01-01
Attending: NURSE PRACTITIONER
Payer: COMMERCIAL

## 2020-01-01 ENCOUNTER — APPOINTMENT (OUTPATIENT)
Dept: CT IMAGING | Facility: CLINIC | Age: 85
End: 2020-01-01
Attending: NURSE PRACTITIONER
Payer: COMMERCIAL

## 2020-01-01 ENCOUNTER — DOCUMENTATION ONLY (OUTPATIENT)
Dept: OTHER | Facility: CLINIC | Age: 85
End: 2020-01-01

## 2020-01-01 ENCOUNTER — DISCHARGE SUMMARY NURSING HOME (OUTPATIENT)
Dept: GERIATRICS | Facility: CLINIC | Age: 85
End: 2020-01-01
Payer: COMMERCIAL

## 2020-01-01 ENCOUNTER — HOSPITAL ENCOUNTER (OUTPATIENT)
Facility: CLINIC | Age: 85
Setting detail: OBSERVATION
Discharge: NURSING, REHAB OR LONG-TERM CARE | End: 2020-10-04
Attending: EMERGENCY MEDICINE | Admitting: HOSPITALIST
Payer: COMMERCIAL

## 2020-01-01 ENCOUNTER — APPOINTMENT (OUTPATIENT)
Dept: CT IMAGING | Facility: CLINIC | Age: 85
End: 2020-01-01
Attending: EMERGENCY MEDICINE
Payer: COMMERCIAL

## 2020-01-01 ENCOUNTER — VIRTUAL VISIT (OUTPATIENT)
Dept: NEUROLOGY | Facility: CLINIC | Age: 85
End: 2020-01-01
Payer: COMMERCIAL

## 2020-01-01 ENCOUNTER — HOSPITAL ENCOUNTER (OUTPATIENT)
Facility: CLINIC | Age: 85
Setting detail: OBSERVATION
Discharge: HOME | End: 2020-07-19
Attending: NURSE PRACTITIONER | Admitting: INTERNAL MEDICINE
Payer: COMMERCIAL

## 2020-01-01 VITALS
OXYGEN SATURATION: 96 % | HEIGHT: 64 IN | WEIGHT: 157.5 LBS | SYSTOLIC BLOOD PRESSURE: 127 MMHG | HEART RATE: 61 BPM | DIASTOLIC BLOOD PRESSURE: 67 MMHG | RESPIRATION RATE: 18 BRPM | BODY MASS INDEX: 26.89 KG/M2 | TEMPERATURE: 97.6 F

## 2020-01-01 VITALS
SYSTOLIC BLOOD PRESSURE: 167 MMHG | OXYGEN SATURATION: 94 % | WEIGHT: 154.4 LBS | HEIGHT: 64 IN | RESPIRATION RATE: 20 BRPM | DIASTOLIC BLOOD PRESSURE: 78 MMHG | TEMPERATURE: 97.5 F | BODY MASS INDEX: 26.36 KG/M2 | HEART RATE: 59 BPM

## 2020-01-01 VITALS
TEMPERATURE: 97.1 F | RESPIRATION RATE: 18 BRPM | BODY MASS INDEX: 25.61 KG/M2 | DIASTOLIC BLOOD PRESSURE: 69 MMHG | HEART RATE: 58 BPM | HEIGHT: 64 IN | SYSTOLIC BLOOD PRESSURE: 112 MMHG | WEIGHT: 150 LBS | OXYGEN SATURATION: 94 %

## 2020-01-01 VITALS
SYSTOLIC BLOOD PRESSURE: 148 MMHG | WEIGHT: 160.1 LBS | DIASTOLIC BLOOD PRESSURE: 73 MMHG | RESPIRATION RATE: 18 BRPM | TEMPERATURE: 96.9 F | BODY MASS INDEX: 27.33 KG/M2 | HEIGHT: 64 IN | OXYGEN SATURATION: 97 % | HEART RATE: 78 BPM

## 2020-01-01 VITALS
BODY MASS INDEX: 26.46 KG/M2 | OXYGEN SATURATION: 96 % | SYSTOLIC BLOOD PRESSURE: 132 MMHG | HEART RATE: 52 BPM | RESPIRATION RATE: 18 BRPM | TEMPERATURE: 97.6 F | DIASTOLIC BLOOD PRESSURE: 68 MMHG | WEIGHT: 155 LBS | HEIGHT: 64 IN

## 2020-01-01 VITALS
BODY MASS INDEX: 25.68 KG/M2 | DIASTOLIC BLOOD PRESSURE: 56 MMHG | WEIGHT: 150.4 LBS | HEART RATE: 57 BPM | RESPIRATION RATE: 16 BRPM | OXYGEN SATURATION: 94 % | SYSTOLIC BLOOD PRESSURE: 102 MMHG | HEIGHT: 64 IN | TEMPERATURE: 98.9 F

## 2020-01-01 VITALS
TEMPERATURE: 97.8 F | RESPIRATION RATE: 19 BRPM | DIASTOLIC BLOOD PRESSURE: 66 MMHG | HEIGHT: 64 IN | HEART RATE: 54 BPM | BODY MASS INDEX: 24.72 KG/M2 | SYSTOLIC BLOOD PRESSURE: 134 MMHG | OXYGEN SATURATION: 99 %

## 2020-01-01 VITALS
HEART RATE: 66 BPM | SYSTOLIC BLOOD PRESSURE: 128 MMHG | TEMPERATURE: 98.4 F | BODY MASS INDEX: 24.72 KG/M2 | RESPIRATION RATE: 16 BRPM | OXYGEN SATURATION: 96 % | WEIGHT: 144 LBS | DIASTOLIC BLOOD PRESSURE: 66 MMHG

## 2020-01-01 VITALS
HEART RATE: 61 BPM | TEMPERATURE: 98.1 F | OXYGEN SATURATION: 98 % | DIASTOLIC BLOOD PRESSURE: 71 MMHG | SYSTOLIC BLOOD PRESSURE: 158 MMHG | RESPIRATION RATE: 16 BRPM

## 2020-01-01 VITALS
OXYGEN SATURATION: 96 % | RESPIRATION RATE: 18 BRPM | WEIGHT: 154.9 LBS | SYSTOLIC BLOOD PRESSURE: 136 MMHG | HEIGHT: 64 IN | BODY MASS INDEX: 26.44 KG/M2 | DIASTOLIC BLOOD PRESSURE: 89 MMHG | TEMPERATURE: 98.2 F | HEART RATE: 65 BPM

## 2020-01-01 VITALS
OXYGEN SATURATION: 95 % | TEMPERATURE: 99.1 F | BODY MASS INDEX: 25.39 KG/M2 | WEIGHT: 148.7 LBS | HEIGHT: 64 IN | SYSTOLIC BLOOD PRESSURE: 95 MMHG | HEART RATE: 46 BPM | RESPIRATION RATE: 20 BRPM | DIASTOLIC BLOOD PRESSURE: 58 MMHG

## 2020-01-01 VITALS
OXYGEN SATURATION: 99 % | DIASTOLIC BLOOD PRESSURE: 68 MMHG | TEMPERATURE: 98.2 F | HEART RATE: 53 BPM | SYSTOLIC BLOOD PRESSURE: 142 MMHG | HEIGHT: 64 IN | RESPIRATION RATE: 18 BRPM | BODY MASS INDEX: 27.66 KG/M2 | WEIGHT: 162 LBS

## 2020-01-01 VITALS
TEMPERATURE: 97.5 F | DIASTOLIC BLOOD PRESSURE: 64 MMHG | OXYGEN SATURATION: 96 % | HEART RATE: 53 BPM | SYSTOLIC BLOOD PRESSURE: 136 MMHG | BODY MASS INDEX: 26.57 KG/M2 | WEIGHT: 155.65 LBS | HEIGHT: 64 IN | RESPIRATION RATE: 19 BRPM

## 2020-01-01 VITALS
WEIGHT: 155 LBS | DIASTOLIC BLOOD PRESSURE: 80 MMHG | HEIGHT: 64 IN | SYSTOLIC BLOOD PRESSURE: 150 MMHG | RESPIRATION RATE: 18 BRPM | HEART RATE: 66 BPM | BODY MASS INDEX: 26.46 KG/M2 | OXYGEN SATURATION: 99 % | TEMPERATURE: 98.3 F

## 2020-01-01 VITALS
WEIGHT: 148.7 LBS | SYSTOLIC BLOOD PRESSURE: 127 MMHG | BODY MASS INDEX: 25.39 KG/M2 | DIASTOLIC BLOOD PRESSURE: 57 MMHG | TEMPERATURE: 97.9 F | OXYGEN SATURATION: 96 % | HEART RATE: 51 BPM | HEIGHT: 64 IN | RESPIRATION RATE: 18 BRPM

## 2020-01-01 DIAGNOSIS — E11.9 TYPE 2 DIABETES MELLITUS WITHOUT COMPLICATION, WITHOUT LONG-TERM CURRENT USE OF INSULIN (H): ICD-10-CM

## 2020-01-01 DIAGNOSIS — F01.50 VASCULAR DEMENTIA WITHOUT BEHAVIORAL DISTURBANCE (H): ICD-10-CM

## 2020-01-01 DIAGNOSIS — Z74.09 MOBILITY IMPAIRED: ICD-10-CM
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
## 2020-01-01 DIAGNOSIS — M62.81 GENERALIZED MUSCLE WEAKNESS: Primary | ICD-10-CM

## 2020-01-01 DIAGNOSIS — R53.81 PHYSICAL DECONDITIONING: Primary | ICD-10-CM

## 2020-01-01 DIAGNOSIS — E87.1 HYPONATREMIA: ICD-10-CM

## 2020-01-01 DIAGNOSIS — G30.1 LATE ONSET ALZHEIMER'S DISEASE WITH BEHAVIORAL DISTURBANCE (H): Primary | ICD-10-CM

## 2020-01-01 DIAGNOSIS — M25.551 HIP PAIN, RIGHT: ICD-10-CM

## 2020-01-01 DIAGNOSIS — R26.89 BALANCE PROBLEMS: ICD-10-CM

## 2020-01-01 DIAGNOSIS — U07.1 INFECTION DUE TO 2019 NOVEL CORONAVIRUS: Primary | ICD-10-CM

## 2020-01-01 DIAGNOSIS — I10 HYPERTENSION GOAL BP (BLOOD PRESSURE) < 140/90: ICD-10-CM

## 2020-01-01 DIAGNOSIS — E78.5 HYPERLIPIDEMIA LDL GOAL <100: ICD-10-CM

## 2020-01-01 DIAGNOSIS — N39.0 URINARY TRACT INFECTION WITHOUT HEMATURIA, SITE UNSPECIFIED: Primary | ICD-10-CM

## 2020-01-01 DIAGNOSIS — R35.0 URINARY FREQUENCY: ICD-10-CM

## 2020-01-01 DIAGNOSIS — M62.81 GENERALIZED MUSCLE WEAKNESS: ICD-10-CM

## 2020-01-01 DIAGNOSIS — U07.1 CLINICAL DIAGNOSIS OF COVID-19: ICD-10-CM

## 2020-01-01 DIAGNOSIS — R53.81 PHYSICAL DECONDITIONING: ICD-10-CM

## 2020-01-01 DIAGNOSIS — R29.6 FALLS FREQUENTLY: ICD-10-CM

## 2020-01-01 DIAGNOSIS — F01.50 VASCULAR DEMENTIA WITHOUT BEHAVIORAL DISTURBANCE (H): Primary | ICD-10-CM

## 2020-01-01 DIAGNOSIS — F03.91 DEMENTIA WITH BEHAVIORAL DISTURBANCE, UNSPECIFIED DEMENTIA TYPE: Primary | ICD-10-CM

## 2020-01-01 DIAGNOSIS — N39.0 URINARY TRACT INFECTION WITHOUT HEMATURIA, SITE UNSPECIFIED: ICD-10-CM
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
## 2020-01-01 DIAGNOSIS — F02.818 LATE ONSET ALZHEIMER'S DISEASE WITH BEHAVIORAL DISTURBANCE (H): Primary | ICD-10-CM

## 2020-01-01 DIAGNOSIS — Z53.9 DIAGNOSIS NOT YET DEFINED: Primary | ICD-10-CM

## 2020-01-01 DIAGNOSIS — E86.0 DEHYDRATION: Primary | ICD-10-CM

## 2020-01-01 DIAGNOSIS — F01.518 VASCULAR DEMENTIA WITH BEHAVIOR DISTURBANCE (H): Primary | ICD-10-CM

## 2020-01-01 LAB
ALBUMIN SERPL-MCNC: 3.5 G/DL (ref 3.4–5)
ALBUMIN UR-MCNC: 10 MG/DL
ALBUMIN UR-MCNC: NEGATIVE MG/DL
ALP SERPL-CCNC: 55 U/L (ref 40–150)
ALT SERPL W P-5'-P-CCNC: 35 U/L (ref 0–50)
ANION GAP SERPL CALCULATED.3IONS-SCNC: 10 MMOL/L (ref 3–14)
ANION GAP SERPL CALCULATED.3IONS-SCNC: 3 MMOL/L (ref 3–14)
ANION GAP SERPL CALCULATED.3IONS-SCNC: 4 MMOL/L (ref 3–14)
ANION GAP SERPL CALCULATED.3IONS-SCNC: 5 MMOL/L (ref 3–14)
ANION GAP SERPL CALCULATED.3IONS-SCNC: 7 MMOL/L (ref 3–14)
ANION GAP SERPL CALCULATED.3IONS-SCNC: 7 MMOL/L (ref 3–14)
ANION GAP SERPL CALCULATED.3IONS-SCNC: 8 MMOL/L (ref 3–14)
ANION GAP SERPL CALCULATED.3IONS-SCNC: 8 MMOL/L (ref 3–14)
APPEARANCE UR: ABNORMAL
APPEARANCE UR: CLEAR
AST SERPL W P-5'-P-CCNC: 40 U/L (ref 0–45)
BACTERIA #/AREA URNS HPF: ABNORMAL /HPF
BACTERIA SPEC CULT: ABNORMAL
BACTERIA SPEC CULT: NORMAL
BASOPHILS # BLD AUTO: 0 10E9/L (ref 0–0.2)
BASOPHILS NFR BLD AUTO: 0.4 %
BASOPHILS NFR BLD AUTO: 0.5 %
BASOPHILS NFR BLD AUTO: 0.5 %
BILIRUB SERPL-MCNC: 0.5 MG/DL (ref 0.2–1.3)
BILIRUB UR QL STRIP: NEGATIVE
BUN SERPL-MCNC: 13 MG/DL (ref 7–30)
BUN SERPL-MCNC: 15 MG/DL (ref 7–30)
BUN SERPL-MCNC: 18 MG/DL (ref 7–30)
BUN SERPL-MCNC: 19 MG/DL (ref 7–30)
BUN SERPL-MCNC: 20 MG/DL (ref 7–30)
BUN SERPL-MCNC: 21 MG/DL (ref 7–30)
BUN SERPL-MCNC: 24 MG/DL (ref 7–30)
BUN SERPL-MCNC: 24 MG/DL (ref 7–30)
BUN SERPL-MCNC: 29 MG/DL (ref 7–30)
BUN SERPL-MCNC: 32 MG/DL (ref 7–30)
CALCIUM SERPL-MCNC: 8.4 MG/DL (ref 8.5–10.1)
CALCIUM SERPL-MCNC: 8.5 MG/DL (ref 8.5–10.1)
CALCIUM SERPL-MCNC: 8.5 MG/DL (ref 8.5–10.1)
CALCIUM SERPL-MCNC: 8.6 MG/DL (ref 8.5–10.1)
CALCIUM SERPL-MCNC: 8.8 MG/DL (ref 8.5–10.1)
CALCIUM SERPL-MCNC: 9.1 MG/DL (ref 8.5–10.1)
CHLORIDE SERPL-SCNC: 100 MMOL/L (ref 94–109)
CHLORIDE SERPL-SCNC: 101 MMOL/L (ref 94–109)
CHLORIDE SERPL-SCNC: 102 MMOL/L (ref 94–109)
CHLORIDE SERPL-SCNC: 103 MMOL/L (ref 94–109)
CHLORIDE SERPL-SCNC: 104 MMOL/L (ref 94–109)
CHLORIDE SERPL-SCNC: 108 MMOL/L (ref 94–109)
CHLORIDE SERPL-SCNC: 109 MMOL/L (ref 94–109)
CHLORIDE SERPL-SCNC: 111 MMOL/L (ref 94–109)
CHLORIDE SERPL-SCNC: 92 MMOL/L (ref 94–109)
CHLORIDE SERPL-SCNC: 97 MMOL/L (ref 94–109)
CO2 SERPL-SCNC: 22 MMOL/L (ref 20–32)
CO2 SERPL-SCNC: 22 MMOL/L (ref 20–32)
CO2 SERPL-SCNC: 23 MMOL/L (ref 20–32)
CO2 SERPL-SCNC: 25 MMOL/L (ref 20–32)
CO2 SERPL-SCNC: 26 MMOL/L (ref 20–32)
CO2 SERPL-SCNC: 28 MMOL/L (ref 20–32)
CO2 SERPL-SCNC: 28 MMOL/L (ref 20–32)
CO2 SERPL-SCNC: 29 MMOL/L (ref 20–32)
CO2 SERPL-SCNC: 30 MMOL/L (ref 20–32)
CO2 SERPL-SCNC: 30 MMOL/L (ref 20–32)
COLOR UR AUTO: ABNORMAL
COLOR UR AUTO: YELLOW
CREAT SERPL-MCNC: 0.6 MG/DL (ref 0.52–1.04)
CREAT SERPL-MCNC: 0.64 MG/DL (ref 0.52–1.04)
CREAT SERPL-MCNC: 0.67 MG/DL (ref 0.52–1.04)
CREAT SERPL-MCNC: 0.72 MG/DL (ref 0.52–1.04)
CREAT SERPL-MCNC: 0.72 MG/DL (ref 0.52–1.04)
CREAT SERPL-MCNC: 0.73 MG/DL (ref 0.52–1.04)
CREAT SERPL-MCNC: 0.78 MG/DL (ref 0.52–1.04)
CREAT SERPL-MCNC: 0.79 MG/DL (ref 0.52–1.04)
CREAT SERPL-MCNC: 0.9 MG/DL (ref 0.52–1.04)
CREAT SERPL-MCNC: 1.02 MG/DL (ref 0.52–1.04)
DIFFERENTIAL METHOD BLD: ABNORMAL
DIFFERENTIAL METHOD BLD: ABNORMAL
DIFFERENTIAL METHOD BLD: NORMAL
EOSINOPHIL # BLD AUTO: 0.1 10E9/L (ref 0–0.7)
EOSINOPHIL # BLD AUTO: 0.2 10E9/L (ref 0–0.7)
EOSINOPHIL # BLD AUTO: 0.2 10E9/L (ref 0–0.7)
EOSINOPHIL NFR BLD AUTO: 1.1 %
EOSINOPHIL NFR BLD AUTO: 2.6 %
EOSINOPHIL NFR BLD AUTO: 3.2 %
ERYTHROCYTE [DISTWIDTH] IN BLOOD BY AUTOMATED COUNT: 13.5 % (ref 10–15)
ERYTHROCYTE [DISTWIDTH] IN BLOOD BY AUTOMATED COUNT: 13.5 % (ref 10–15)
ERYTHROCYTE [DISTWIDTH] IN BLOOD BY AUTOMATED COUNT: 13.7 % (ref 10–15)
ERYTHROCYTE [DISTWIDTH] IN BLOOD BY AUTOMATED COUNT: 13.9 % (ref 10–15)
ERYTHROCYTE [DISTWIDTH] IN BLOOD BY AUTOMATED COUNT: 14 % (ref 10–15)
ERYTHROCYTE [DISTWIDTH] IN BLOOD BY AUTOMATED COUNT: 14.1 % (ref 10–15)
ERYTHROCYTE [DISTWIDTH] IN BLOOD BY AUTOMATED COUNT: 14.3 % (ref 10–15)
ERYTHROCYTE [DISTWIDTH] IN BLOOD BY AUTOMATED COUNT: 14.4 % (ref 10–15)
GFR SERPL CREATININE-BSD FRML MDRD: 48 ML/MIN/{1.73_M2}
GFR SERPL CREATININE-BSD FRML MDRD: 55 ML/MIN/{1.73_M2}
GFR SERPL CREATININE-BSD FRML MDRD: 65 ML/MIN/{1.73_M2}
GFR SERPL CREATININE-BSD FRML MDRD: 66 ML/MIN/{1.73_M2}
GFR SERPL CREATININE-BSD FRML MDRD: 71 ML/MIN/{1.73_M2}
GFR SERPL CREATININE-BSD FRML MDRD: 73 ML/MIN/{1.73_M2}
GFR SERPL CREATININE-BSD FRML MDRD: 73 ML/MIN/{1.73_M2}
GFR SERPL CREATININE-BSD FRML MDRD: 76 ML/MIN/{1.73_M2}
GFR SERPL CREATININE-BSD FRML MDRD: 77 ML/MIN/{1.73_M2}
GFR SERPL CREATININE-BSD FRML MDRD: 79 ML/MIN/{1.73_M2}
GLUCOSE BLDC GLUCOMTR-MCNC: 112 MG/DL (ref 70–99)
GLUCOSE BLDC GLUCOMTR-MCNC: 114 MG/DL (ref 70–99)
GLUCOSE BLDC GLUCOMTR-MCNC: 121 MG/DL (ref 70–99)
GLUCOSE BLDC GLUCOMTR-MCNC: 124 MG/DL (ref 70–99)
GLUCOSE BLDC GLUCOMTR-MCNC: 127 MG/DL (ref 70–99)
GLUCOSE BLDC GLUCOMTR-MCNC: 127 MG/DL (ref 70–99)
GLUCOSE BLDC GLUCOMTR-MCNC: 147 MG/DL (ref 70–99)
GLUCOSE BLDC GLUCOMTR-MCNC: 152 MG/DL (ref 70–99)
GLUCOSE BLDC GLUCOMTR-MCNC: 173 MG/DL (ref 70–99)
GLUCOSE BLDC GLUCOMTR-MCNC: 76 MG/DL (ref 70–99)
GLUCOSE BLDC GLUCOMTR-MCNC: 78 MG/DL (ref 70–99)
GLUCOSE BLDC GLUCOMTR-MCNC: 79 MG/DL (ref 70–99)
GLUCOSE BLDC GLUCOMTR-MCNC: 84 MG/DL (ref 70–99)
GLUCOSE BLDC GLUCOMTR-MCNC: 85 MG/DL (ref 70–99)
GLUCOSE BLDC GLUCOMTR-MCNC: 88 MG/DL (ref 70–99)
GLUCOSE BLDC GLUCOMTR-MCNC: 92 MG/DL (ref 70–99)
GLUCOSE BLDC GLUCOMTR-MCNC: 93 MG/DL (ref 70–99)
GLUCOSE BLDC GLUCOMTR-MCNC: 94 MG/DL (ref 70–99)
GLUCOSE BLDC GLUCOMTR-MCNC: 99 MG/DL (ref 70–99)
GLUCOSE BLDC GLUCOMTR-MCNC: 99 MG/DL (ref 70–99)
GLUCOSE SERPL-MCNC: 106 MG/DL (ref 70–99)
GLUCOSE SERPL-MCNC: 161 MG/DL (ref 70–99)
GLUCOSE SERPL-MCNC: 67 MG/DL (ref 70–99)
GLUCOSE SERPL-MCNC: 81 MG/DL (ref 70–99)
GLUCOSE SERPL-MCNC: 82 MG/DL (ref 70–99)
GLUCOSE SERPL-MCNC: 84 MG/DL (ref 70–99)
GLUCOSE SERPL-MCNC: 84 MG/DL (ref 70–99)
GLUCOSE SERPL-MCNC: 85 MG/DL (ref 70–99)
GLUCOSE SERPL-MCNC: 87 MG/DL (ref 70–99)
GLUCOSE SERPL-MCNC: 96 MG/DL (ref 70–99)
GLUCOSE UR STRIP-MCNC: NEGATIVE MG/DL
HBA1C MFR BLD: 5.4 % (ref 0–5.6)
HCT VFR BLD AUTO: 32.3 % (ref 35–47)
HCT VFR BLD AUTO: 32.3 % (ref 35–47)
HCT VFR BLD AUTO: 32.5 % (ref 35–47)
HCT VFR BLD AUTO: 32.6 % (ref 35–47)
HCT VFR BLD AUTO: 34.1 % (ref 35–47)
HCT VFR BLD AUTO: 34.1 % (ref 35–47)
HCT VFR BLD AUTO: 36 % (ref 35–47)
HCT VFR BLD AUTO: 37.2 % (ref 35–47)
HGB BLD-MCNC: 10.3 G/DL (ref 11.7–15.7)
HGB BLD-MCNC: 10.8 G/DL (ref 11.7–15.7)
HGB BLD-MCNC: 10.8 G/DL (ref 11.7–15.7)
HGB BLD-MCNC: 10.9 G/DL (ref 11.7–15.7)
HGB BLD-MCNC: 11 G/DL (ref 11.7–15.7)
HGB BLD-MCNC: 11.1 G/DL (ref 11.7–15.7)
HGB BLD-MCNC: 11.5 G/DL (ref 11.7–15.7)
HGB BLD-MCNC: 12.1 G/DL (ref 11.7–15.7)
HGB UR QL STRIP: ABNORMAL
HGB UR QL STRIP: ABNORMAL
HGB UR QL STRIP: NEGATIVE
HGB UR QL STRIP: NEGATIVE
IMM GRANULOCYTES # BLD: 0 10E9/L (ref 0–0.4)
IMM GRANULOCYTES NFR BLD: 0.1 %
IMM GRANULOCYTES NFR BLD: 0.2 %
IMM GRANULOCYTES NFR BLD: 0.2 %
INTERPRETATION ECG - MUSE: NORMAL
KETONES UR STRIP-MCNC: NEGATIVE MG/DL
LABORATORY COMMENT REPORT: NORMAL
LEUKOCYTE ESTERASE UR QL STRIP: ABNORMAL
LEUKOCYTE ESTERASE UR QL STRIP: ABNORMAL
LEUKOCYTE ESTERASE UR QL STRIP: NEGATIVE
LEUKOCYTE ESTERASE UR QL STRIP: NEGATIVE
LYMPHOCYTES # BLD AUTO: 1 10E9/L (ref 0.8–5.3)
LYMPHOCYTES # BLD AUTO: 1.1 10E9/L (ref 0.8–5.3)
LYMPHOCYTES # BLD AUTO: 1.1 10E9/L (ref 0.8–5.3)
LYMPHOCYTES NFR BLD AUTO: 14.6 %
LYMPHOCYTES NFR BLD AUTO: 15.4 %
LYMPHOCYTES NFR BLD AUTO: 17.6 %
Lab: NORMAL
MCH RBC QN AUTO: 27.6 PG (ref 26.5–33)
MCH RBC QN AUTO: 27.8 PG (ref 26.5–33)
MCH RBC QN AUTO: 27.8 PG (ref 26.5–33)
MCH RBC QN AUTO: 28.1 PG (ref 26.5–33)
MCH RBC QN AUTO: 28.1 PG (ref 26.5–33)
MCH RBC QN AUTO: 28.3 PG (ref 26.5–33)
MCH RBC QN AUTO: 28.5 PG (ref 26.5–33)
MCH RBC QN AUTO: 28.7 PG (ref 26.5–33)
MCHC RBC AUTO-ENTMCNC: 31.6 G/DL (ref 31.5–36.5)
MCHC RBC AUTO-ENTMCNC: 31.9 G/DL (ref 31.5–36.5)
MCHC RBC AUTO-ENTMCNC: 32 G/DL (ref 31.5–36.5)
MCHC RBC AUTO-ENTMCNC: 32.3 G/DL (ref 31.5–36.5)
MCHC RBC AUTO-ENTMCNC: 32.5 G/DL (ref 31.5–36.5)
MCHC RBC AUTO-ENTMCNC: 33.2 G/DL (ref 31.5–36.5)
MCHC RBC AUTO-ENTMCNC: 33.4 G/DL (ref 31.5–36.5)
MCHC RBC AUTO-ENTMCNC: 34.4 G/DL (ref 31.5–36.5)
MCV RBC AUTO: 83 FL (ref 78–100)
MCV RBC AUTO: 84 FL (ref 78–100)
MCV RBC AUTO: 86 FL (ref 78–100)
MCV RBC AUTO: 87 FL (ref 78–100)
MCV RBC AUTO: 88 FL (ref 78–100)
MCV RBC AUTO: 89 FL (ref 78–100)
MONOCYTES # BLD AUTO: 0.4 10E9/L (ref 0–1.3)
MONOCYTES # BLD AUTO: 0.4 10E9/L (ref 0–1.3)
MONOCYTES # BLD AUTO: 0.6 10E9/L (ref 0–1.3)
MONOCYTES NFR BLD AUTO: 6 %
MONOCYTES NFR BLD AUTO: 6.5 %
MONOCYTES NFR BLD AUTO: 8.7 %
MUCOUS THREADS #/AREA URNS LPF: PRESENT /LPF
MUCOUS THREADS #/AREA URNS LPF: PRESENT /LPF
NEUTROPHILS # BLD AUTO: 4.5 10E9/L (ref 1.6–8.3)
NEUTROPHILS # BLD AUTO: 5 10E9/L (ref 1.6–8.3)
NEUTROPHILS # BLD AUTO: 5.3 10E9/L (ref 1.6–8.3)
NEUTROPHILS NFR BLD AUTO: 72 %
NEUTROPHILS NFR BLD AUTO: 74.3 %
NEUTROPHILS NFR BLD AUTO: 76.1 %
NITRATE UR QL: NEGATIVE
NITRATE UR QL: POSITIVE
NRBC # BLD AUTO: 0 10*3/UL
NRBC BLD AUTO-RTO: 0 /100
OSMOLALITY UR: 267 MMOL/KG (ref 100–1200)
PH UR STRIP: 6 PH (ref 5–7)
PH UR STRIP: 6.5 PH (ref 5–7)
PH UR STRIP: 6.5 PH (ref 5–7)
PH UR STRIP: 7 PH (ref 5–7)
PLATELET # BLD AUTO: 209 10E9/L (ref 150–450)
PLATELET # BLD AUTO: 221 10E9/L (ref 150–450)
PLATELET # BLD AUTO: 222 10E9/L (ref 150–450)
PLATELET # BLD AUTO: 231 10E9/L (ref 150–450)
PLATELET # BLD AUTO: 234 10E9/L (ref 150–450)
PLATELET # BLD AUTO: 236 10E9/L (ref 150–450)
PLATELET # BLD AUTO: 246 10E9/L (ref 150–450)
PLATELET # BLD AUTO: 261 10E9/L (ref 150–450)
POTASSIUM SERPL-SCNC: 3.2 MMOL/L (ref 3.4–5.3)
POTASSIUM SERPL-SCNC: 3.4 MMOL/L (ref 3.4–5.3)
POTASSIUM SERPL-SCNC: 3.5 MMOL/L (ref 3.4–5.3)
POTASSIUM SERPL-SCNC: 3.6 MMOL/L (ref 3.4–5.3)
POTASSIUM SERPL-SCNC: 3.7 MMOL/L (ref 3.4–5.3)
POTASSIUM SERPL-SCNC: 3.9 MMOL/L (ref 3.4–5.3)
POTASSIUM SERPL-SCNC: 3.9 MMOL/L (ref 3.4–5.3)
POTASSIUM SERPL-SCNC: 4 MMOL/L (ref 3.4–5.3)
PROT SERPL-MCNC: 6.9 G/DL (ref 6.8–8.8)
RBC # BLD AUTO: 3.71 10E12/L (ref 3.8–5.2)
RBC # BLD AUTO: 3.76 10E12/L (ref 3.8–5.2)
RBC # BLD AUTO: 3.84 10E12/L (ref 3.8–5.2)
RBC # BLD AUTO: 3.85 10E12/L (ref 3.8–5.2)
RBC # BLD AUTO: 3.9 10E12/L (ref 3.8–5.2)
RBC # BLD AUTO: 3.95 10E12/L (ref 3.8–5.2)
RBC # BLD AUTO: 4.17 10E12/L (ref 3.8–5.2)
RBC # BLD AUTO: 4.3 10E12/L (ref 3.8–5.2)
RBC #/AREA URNS AUTO: 12 /HPF (ref 0–2)
RBC #/AREA URNS AUTO: 2 /HPF (ref 0–2)
RBC #/AREA URNS AUTO: 5 /HPF (ref 0–2)
RBC #/AREA URNS AUTO: 9 /HPF (ref 0–2)
SARS-COV-2 RNA SPEC QL NAA+PROBE: ABNORMAL
SARS-COV-2 RNA SPEC QL NAA+PROBE: NEGATIVE
SARS-COV-2 RNA SPEC QL NAA+PROBE: NORMAL
SARS-COV-2 RNA SPEC QL NAA+PROBE: NOT DETECTED
SODIUM SERPL-SCNC: 125 MMOL/L (ref 133–144)
SODIUM SERPL-SCNC: 127 MMOL/L (ref 133–144)
SODIUM SERPL-SCNC: 130 MMOL/L (ref 133–144)
SODIUM SERPL-SCNC: 131 MMOL/L (ref 133–144)
SODIUM SERPL-SCNC: 135 MMOL/L (ref 133–144)
SODIUM SERPL-SCNC: 137 MMOL/L (ref 133–144)
SODIUM SERPL-SCNC: 139 MMOL/L (ref 133–144)
SODIUM SERPL-SCNC: 140 MMOL/L (ref 133–144)
SODIUM SERPL-SCNC: 143 MMOL/L (ref 133–144)
SODIUM SERPL-SCNC: 143 MMOL/L (ref 133–144)
SODIUM UR-SCNC: 81 MMOL/L
SOURCE: ABNORMAL
SP GR UR STRIP: 1.01 (ref 1–1.03)
SP GR UR STRIP: 1.01 (ref 1–1.03)
SP GR UR STRIP: 1.02 (ref 1–1.03)
SP GR UR STRIP: 1.02 (ref 1–1.03)
SPECIMEN SOURCE: ABNORMAL
SPECIMEN SOURCE: ABNORMAL
SPECIMEN SOURCE: NORMAL
SQUAMOUS #/AREA URNS AUTO: 2 /HPF (ref 0–1)
SQUAMOUS #/AREA URNS AUTO: 5 /HPF (ref 0–1)
SQUAMOUS #/AREA URNS AUTO: <1 /HPF (ref 0–1)
UROBILINOGEN UR STRIP-MCNC: NORMAL MG/DL (ref 0–2)
WBC # BLD AUTO: 5.7 10E9/L (ref 4–11)
WBC # BLD AUTO: 5.8 10E9/L (ref 4–11)
WBC # BLD AUTO: 5.9 10E9/L (ref 4–11)
WBC # BLD AUTO: 6.2 10E9/L (ref 4–11)
WBC # BLD AUTO: 6.5 10E9/L (ref 4–11)
WBC # BLD AUTO: 6.5 10E9/L (ref 4–11)
WBC # BLD AUTO: 7.1 10E9/L (ref 4–11)
WBC # BLD AUTO: 7.2 10E9/L (ref 4–11)
WBC #/AREA URNS AUTO: 2 /HPF (ref 0–5)
WBC #/AREA URNS AUTO: 2 /HPF (ref 0–5)
WBC #/AREA URNS AUTO: 84 /HPF (ref 0–5)
WBC #/AREA URNS AUTO: <1 /HPF (ref 0–5)

## 2020-01-01 PROCEDURE — 96361 HYDRATE IV INFUSION ADD-ON: CPT

## 2020-01-01 PROCEDURE — 80048 BASIC METABOLIC PNL TOTAL CA: CPT | Performed by: EMERGENCY MEDICINE

## 2020-01-01 PROCEDURE — 36415 COLL VENOUS BLD VENIPUNCTURE: CPT | Performed by: INTERNAL MEDICINE

## 2020-01-01 PROCEDURE — 87086 URINE CULTURE/COLONY COUNT: CPT | Performed by: EMERGENCY MEDICINE

## 2020-01-01 PROCEDURE — 999N001017 HC STATISTIC GLUCOSE BY METER IP

## 2020-01-01 PROCEDURE — 99207 PR CDG-MDM COMPONENT: MEETS MODERATE - UP CODED: CPT | Performed by: PHYSICIAN ASSISTANT

## 2020-01-01 PROCEDURE — 250N000011 HC RX IP 250 OP 636: Performed by: EMERGENCY MEDICINE

## 2020-01-01 PROCEDURE — 250N000013 HC RX MED GY IP 250 OP 250 PS 637: Performed by: PHYSICIAN ASSISTANT

## 2020-01-01 PROCEDURE — 97530 THERAPEUTIC ACTIVITIES: CPT | Mod: GP

## 2020-01-01 PROCEDURE — 258N000003 HC RX IP 258 OP 636: Performed by: PHYSICIAN ASSISTANT

## 2020-01-01 PROCEDURE — 99309 SBSQ NF CARE MODERATE MDM 30: CPT | Performed by: NURSE PRACTITIONER

## 2020-01-01 PROCEDURE — 25000132 ZZH RX MED GY IP 250 OP 250 PS 637: Performed by: INTERNAL MEDICINE

## 2020-01-01 PROCEDURE — 99225 PR SUBSEQUENT OBSERVATION CARE,LEVEL II: CPT | Performed by: PHYSICIAN ASSISTANT

## 2020-01-01 PROCEDURE — G0378 HOSPITAL OBSERVATION PER HR: HCPCS

## 2020-01-01 PROCEDURE — 250N000011 HC RX IP 250 OP 636: Performed by: PHYSICIAN ASSISTANT

## 2020-01-01 PROCEDURE — 96372 THER/PROPH/DIAG INJ SC/IM: CPT | Performed by: PHYSICIAN ASSISTANT

## 2020-01-01 PROCEDURE — 80048 BASIC METABOLIC PNL TOTAL CA: CPT | Performed by: INTERNAL MEDICINE

## 2020-01-01 PROCEDURE — 83036 HEMOGLOBIN GLYCOSYLATED A1C: CPT | Performed by: EMERGENCY MEDICINE

## 2020-01-01 PROCEDURE — 93005 ELECTROCARDIOGRAM TRACING: CPT | Mod: 76

## 2020-01-01 PROCEDURE — 99207 PR MD CERTIFICATION HHA PATIENT: CPT | Performed by: FAMILY MEDICINE

## 2020-01-01 PROCEDURE — 250N000012 HC RX MED GY IP 250 OP 636 PS 637: Performed by: PHYSICIAN ASSISTANT

## 2020-01-01 PROCEDURE — 83935 ASSAY OF URINE OSMOLALITY: CPT | Performed by: INTERNAL MEDICINE

## 2020-01-01 PROCEDURE — 81001 URINALYSIS AUTO W/SCOPE: CPT | Performed by: EMERGENCY MEDICINE

## 2020-01-01 PROCEDURE — 99220 PR INITIAL OBSERVATION CARE,LEVEL III: CPT | Performed by: PHYSICIAN ASSISTANT

## 2020-01-01 PROCEDURE — U0003 INFECTIOUS AGENT DETECTION BY NUCLEIC ACID (DNA OR RNA); SEVERE ACUTE RESPIRATORY SYNDROME CORONAVIRUS 2 (SARS-COV-2) (CORONAVIRUS DISEASE [COVID-19]), AMPLIFIED PROBE TECHNIQUE, MAKING USE OF HIGH THROUGHPUT TECHNOLOGIES AS DESCRIBED BY CMS-2020-01-R: HCPCS | Performed by: EMERGENCY MEDICINE

## 2020-01-01 PROCEDURE — 25800030 ZZH RX IP 258 OP 636: Performed by: INTERNAL MEDICINE

## 2020-01-01 PROCEDURE — 81001 URINALYSIS AUTO W/SCOPE: CPT | Performed by: INTERNAL MEDICINE

## 2020-01-01 PROCEDURE — 81001 URINALYSIS AUTO W/SCOPE: CPT | Performed by: NURSE PRACTITIONER

## 2020-01-01 PROCEDURE — 99207 PR MOONLIGHTING INDICATOR: CPT | Performed by: PHYSICIAN ASSISTANT

## 2020-01-01 PROCEDURE — 96376 TX/PRO/DX INJ SAME DRUG ADON: CPT

## 2020-01-01 PROCEDURE — 99284 EMERGENCY DEPT VISIT MOD MDM: CPT | Mod: 25

## 2020-01-01 PROCEDURE — C9803 HOPD COVID-19 SPEC COLLECT: HCPCS

## 2020-01-01 PROCEDURE — 87088 URINE BACTERIA CULTURE: CPT | Performed by: EMERGENCY MEDICINE

## 2020-01-01 PROCEDURE — 93005 ELECTROCARDIOGRAM TRACING: CPT

## 2020-01-01 PROCEDURE — 99316 NF DSCHRG MGMT 30 MIN+: CPT | Mod: GV | Performed by: NURSE PRACTITIONER

## 2020-01-01 PROCEDURE — 250N000013 HC RX MED GY IP 250 OP 250 PS 637: Performed by: HOSPITALIST

## 2020-01-01 PROCEDURE — 80053 COMPREHEN METABOLIC PANEL: CPT | Performed by: NURSE PRACTITIONER

## 2020-01-01 PROCEDURE — 99207 ZZC CDG-CODE CATEGORY CHANGED: CPT | Performed by: INTERNAL MEDICINE

## 2020-01-01 PROCEDURE — 70450 CT HEAD/BRAIN W/O DYE: CPT

## 2020-01-01 PROCEDURE — 96365 THER/PROPH/DIAG IV INF INIT: CPT

## 2020-01-01 PROCEDURE — 73502 X-RAY EXAM HIP UNI 2-3 VIEWS: CPT

## 2020-01-01 PROCEDURE — 99309 SBSQ NF CARE MODERATE MDM 30: CPT | Mod: 95 | Performed by: NURSE PRACTITIONER

## 2020-01-01 PROCEDURE — G0180 MD CERTIFICATION HHA PATIENT: HCPCS | Performed by: FAMILY MEDICINE

## 2020-01-01 PROCEDURE — 99305 1ST NF CARE MODERATE MDM 35: CPT | Performed by: NURSE PRACTITIONER

## 2020-01-01 PROCEDURE — 00000146 ZZHCL STATISTIC GLUCOSE BY METER IP

## 2020-01-01 PROCEDURE — 36415 COLL VENOUS BLD VENIPUNCTURE: CPT | Performed by: PHYSICIAN ASSISTANT

## 2020-01-01 PROCEDURE — 99225 ZZC SUBSEQUENT OBSERVATION CARE,LEVEL II: CPT | Performed by: INTERNAL MEDICINE

## 2020-01-01 PROCEDURE — 99316 NF DSCHRG MGMT 30 MIN+: CPT | Performed by: NURSE PRACTITIONER

## 2020-01-01 PROCEDURE — 99214 OFFICE O/P EST MOD 30 MIN: CPT | Mod: 95 | Performed by: NURSE PRACTITIONER

## 2020-01-01 PROCEDURE — 85027 COMPLETE CBC AUTOMATED: CPT | Performed by: INTERNAL MEDICINE

## 2020-01-01 PROCEDURE — 99220 ZZC INITIAL OBSERVATION CARE,LEVL III: CPT | Performed by: INTERNAL MEDICINE

## 2020-01-01 PROCEDURE — 97161 PT EVAL LOW COMPLEX 20 MIN: CPT | Mod: GP

## 2020-01-01 PROCEDURE — 99284 EMERGENCY DEPT VISIT MOD MDM: CPT

## 2020-01-01 PROCEDURE — U0003 INFECTIOUS AGENT DETECTION BY NUCLEIC ACID (DNA OR RNA); SEVERE ACUTE RESPIRATORY SYNDROME CORONAVIRUS 2 (SARS-COV-2) (CORONAVIRUS DISEASE [COVID-19]), AMPLIFIED PROBE TECHNIQUE, MAKING USE OF HIGH THROUGHPUT TECHNOLOGIES AS DESCRIBED BY CMS-2020-01-R: HCPCS | Performed by: NURSE PRACTITIONER

## 2020-01-01 PROCEDURE — 25000132 ZZH RX MED GY IP 250 OP 250 PS 637: Performed by: EMERGENCY MEDICINE

## 2020-01-01 PROCEDURE — 25800030 ZZH RX IP 258 OP 636: Performed by: NURSE PRACTITIONER

## 2020-01-01 PROCEDURE — 99217 PR OBSERVATION CARE DISCHARGE: CPT | Performed by: PHYSICIAN ASSISTANT

## 2020-01-01 PROCEDURE — 99285 EMERGENCY DEPT VISIT HI MDM: CPT | Mod: 25

## 2020-01-01 PROCEDURE — 97162 PT EVAL MOD COMPLEX 30 MIN: CPT | Mod: GP

## 2020-01-01 PROCEDURE — 85025 COMPLETE CBC W/AUTO DIFF WBC: CPT | Performed by: EMERGENCY MEDICINE

## 2020-01-01 PROCEDURE — 96360 HYDRATION IV INFUSION INIT: CPT

## 2020-01-01 PROCEDURE — 99217 ZZC OBSERVATION CARE DISCHARGE: CPT | Performed by: INTERNAL MEDICINE

## 2020-01-01 PROCEDURE — 85025 COMPLETE CBC W/AUTO DIFF WBC: CPT | Performed by: NURSE PRACTITIONER

## 2020-01-01 PROCEDURE — 80048 BASIC METABOLIC PNL TOTAL CA: CPT | Performed by: PHYSICIAN ASSISTANT

## 2020-01-01 PROCEDURE — 87186 SC STD MICRODIL/AGAR DIL: CPT | Performed by: EMERGENCY MEDICINE

## 2020-01-01 PROCEDURE — 85027 COMPLETE CBC AUTOMATED: CPT | Performed by: PHYSICIAN ASSISTANT

## 2020-01-01 PROCEDURE — 84300 ASSAY OF URINE SODIUM: CPT | Performed by: INTERNAL MEDICINE

## 2020-01-01 RX ORDER — DONEPEZIL HYDROCHLORIDE 5 MG/1
5 TABLET, FILM COATED ORAL AT BEDTIME
Qty: 30 TABLET | Refills: 1 | Status: SHIPPED | OUTPATIENT
Start: 2020-01-01 | End: 2020-01-01 | Stop reason: DRUGHIGH

## 2020-01-01 RX ORDER — LIDOCAINE 40 MG/G
CREAM TOPICAL
Status: DISCONTINUED | OUTPATIENT
Start: 2020-01-01 | End: 2020-01-01 | Stop reason: HOSPADM

## 2020-01-01 RX ORDER — GLIPIZIDE 5 MG/1
2.5 TABLET ORAL 2 TIMES DAILY
Qty: 45 TABLET | Refills: 3 | Status: ON HOLD | OUTPATIENT
Start: 2020-01-01 | End: 2020-01-01

## 2020-01-01 RX ORDER — CEFTRIAXONE 1 G/1
1 INJECTION, POWDER, FOR SOLUTION INTRAMUSCULAR; INTRAVENOUS ONCE
Status: COMPLETED | OUTPATIENT
Start: 2020-01-01 | End: 2020-01-01

## 2020-01-01 RX ORDER — CHOLECALCIFEROL (VITAMIN D3) 50 MCG
1 TABLET ORAL 2 TIMES DAILY
COMMUNITY
End: 2020-01-01

## 2020-01-01 RX ORDER — CEFTRIAXONE 1 G/1
1 INJECTION, POWDER, FOR SOLUTION INTRAMUSCULAR; INTRAVENOUS EVERY 24 HOURS
Status: DISCONTINUED | OUTPATIENT
Start: 2020-01-01 | End: 2020-01-01 | Stop reason: HOSPADM

## 2020-01-01 RX ORDER — ONDANSETRON 2 MG/ML
4 INJECTION INTRAMUSCULAR; INTRAVENOUS EVERY 6 HOURS PRN
Status: DISCONTINUED | OUTPATIENT
Start: 2020-01-01 | End: 2020-01-01 | Stop reason: HOSPADM

## 2020-01-01 RX ORDER — BISACODYL 10 MG
10 SUPPOSITORY, RECTAL RECTAL DAILY PRN
Status: DISCONTINUED | OUTPATIENT
Start: 2020-01-01 | End: 2020-01-01 | Stop reason: HOSPADM

## 2020-01-01 RX ORDER — PROCHLORPERAZINE 25 MG
12.5 SUPPOSITORY, RECTAL RECTAL EVERY 12 HOURS PRN
Status: DISCONTINUED | OUTPATIENT
Start: 2020-01-01 | End: 2020-01-01 | Stop reason: HOSPADM

## 2020-01-01 RX ORDER — OMEGA-3 FATTY ACIDS/FISH OIL 300-1000MG
200 CAPSULE ORAL EVERY 6 HOURS PRN
Status: ON HOLD | COMMUNITY
End: 2020-01-01

## 2020-01-01 RX ORDER — ACETAMINOPHEN 325 MG/1
650 TABLET ORAL EVERY 4 HOURS PRN
Status: DISCONTINUED | OUTPATIENT
Start: 2020-01-01 | End: 2020-01-01 | Stop reason: HOSPADM

## 2020-01-01 RX ORDER — ACETAMINOPHEN 650 MG/1
650 SUPPOSITORY RECTAL EVERY 4 HOURS PRN
Status: DISCONTINUED | OUTPATIENT
Start: 2020-01-01 | End: 2020-01-01 | Stop reason: HOSPADM

## 2020-01-01 RX ORDER — GLIPIZIDE 5 MG/1
2.5 TABLET ORAL 2 TIMES DAILY WITH MEALS
COMMUNITY
End: 2020-01-01

## 2020-01-01 RX ORDER — DONEPEZIL HYDROCHLORIDE 5 MG/1
5 TABLET, FILM COATED ORAL DAILY
Status: DISCONTINUED | OUTPATIENT
Start: 2020-01-01 | End: 2020-01-01 | Stop reason: HOSPADM

## 2020-01-01 RX ORDER — BISACODYL 5 MG
10 TABLET, DELAYED RELEASE (ENTERIC COATED) ORAL DAILY PRN
Status: DISCONTINUED | OUTPATIENT
Start: 2020-01-01 | End: 2020-01-01 | Stop reason: HOSPADM

## 2020-01-01 RX ORDER — DONEPEZIL HYDROCHLORIDE 10 MG/1
10 TABLET, FILM COATED ORAL AT BEDTIME
Qty: 30 TABLET | Refills: 11 | Status: SHIPPED | OUTPATIENT
Start: 2020-01-01 | End: 2020-01-01

## 2020-01-01 RX ORDER — NAPROXEN SODIUM 220 MG
220 TABLET ORAL 2 TIMES DAILY PRN
COMMUNITY
End: 2020-01-01

## 2020-01-01 RX ORDER — LANOLIN ALCOHOL/MO/W.PET/CERES
6 CREAM (GRAM) TOPICAL AT BEDTIME
COMMUNITY

## 2020-01-01 RX ORDER — ASPIRIN 81 MG/1
81 TABLET ORAL 2 TIMES DAILY
Status: DISCONTINUED | OUTPATIENT
Start: 2020-01-01 | End: 2020-01-01 | Stop reason: HOSPADM

## 2020-01-01 RX ORDER — CIPROFLOXACIN 500 MG/1
500 TABLET, FILM COATED ORAL 2 TIMES DAILY
Qty: 8 TABLET | Refills: 0 | Status: SHIPPED | OUTPATIENT
Start: 2020-01-01 | End: 2020-01-01

## 2020-01-01 RX ORDER — LOSARTAN POTASSIUM 50 MG/1
50 TABLET ORAL AT BEDTIME
COMMUNITY
End: 2020-01-01

## 2020-01-01 RX ORDER — LOVASTATIN 40 MG
40 TABLET ORAL AT BEDTIME
COMMUNITY
End: 2020-01-01

## 2020-01-01 RX ORDER — VITS A,C,E/LUTEIN/MINERALS 300MCG-200
1 TABLET ORAL DAILY
COMMUNITY
End: 2020-01-01

## 2020-01-01 RX ORDER — LOSARTAN POTASSIUM 50 MG/1
50 TABLET ORAL AT BEDTIME
Status: DISCONTINUED | OUTPATIENT
Start: 2020-01-01 | End: 2020-01-01 | Stop reason: HOSPADM

## 2020-01-01 RX ORDER — DONEPEZIL HYDROCHLORIDE 10 MG/1
10 TABLET, FILM COATED ORAL EVERY MORNING
COMMUNITY

## 2020-01-01 RX ORDER — ACETAMINOPHEN 325 MG/1
650 TABLET ORAL EVERY 4 HOURS PRN
COMMUNITY

## 2020-01-01 RX ORDER — SODIUM CHLORIDE, SODIUM LACTATE, POTASSIUM CHLORIDE, CALCIUM CHLORIDE 600; 310; 30; 20 MG/100ML; MG/100ML; MG/100ML; MG/100ML
INJECTION, SOLUTION INTRAVENOUS CONTINUOUS
Status: DISCONTINUED | OUTPATIENT
Start: 2020-01-01 | End: 2020-01-01

## 2020-01-01 RX ORDER — HYDROCHLOROTHIAZIDE 12.5 MG/1
CAPSULE ORAL
Qty: 90 CAPSULE | Refills: 3 | Status: ON HOLD | OUTPATIENT
Start: 2020-01-01 | End: 2020-01-01

## 2020-01-01 RX ORDER — BISACODYL 5 MG
5 TABLET, DELAYED RELEASE (ENTERIC COATED) ORAL DAILY PRN
Status: DISCONTINUED | OUTPATIENT
Start: 2020-01-01 | End: 2020-01-01 | Stop reason: HOSPADM

## 2020-01-01 RX ORDER — SODIUM CHLORIDE 9 MG/ML
INJECTION, SOLUTION INTRAVENOUS CONTINUOUS
Status: ACTIVE | OUTPATIENT
Start: 2020-01-01 | End: 2020-01-01

## 2020-01-01 RX ORDER — ASPIRIN 81 MG/1
81 TABLET ORAL DAILY
COMMUNITY

## 2020-01-01 RX ORDER — GLIPIZIDE 5 MG/1
2.5 TABLET ORAL
Status: ON HOLD | COMMUNITY
Start: 2020-01-01 | End: 2020-01-01

## 2020-01-01 RX ORDER — AMOXICILLIN 250 MG
2 CAPSULE ORAL 2 TIMES DAILY
Status: DISCONTINUED | OUTPATIENT
Start: 2020-01-01 | End: 2020-01-01 | Stop reason: HOSPADM

## 2020-01-01 RX ORDER — QUETIAPINE FUMARATE 25 MG/1
12.5 TABLET, FILM COATED ORAL 3 TIMES DAILY PRN
Qty: 10 TABLET | Refills: 0 | Status: SHIPPED | OUTPATIENT
Start: 2020-01-01 | End: 2020-01-01

## 2020-01-01 RX ORDER — MULTIVIT WITH MINERALS/LUTEIN
1 TABLET ORAL DAILY
COMMUNITY
End: 2020-01-01

## 2020-01-01 RX ORDER — SULFAMETHOXAZOLE/TRIMETHOPRIM 800-160 MG
1 TABLET ORAL 2 TIMES DAILY
Qty: 8 TABLET | Refills: 0 | Status: SHIPPED | OUTPATIENT
Start: 2020-01-01 | End: 2020-01-01

## 2020-01-01 RX ORDER — GLIPIZIDE 5 MG/1
TABLET ORAL
Qty: 180 TABLET | Refills: 1 | Status: SHIPPED | OUTPATIENT
Start: 2020-01-01 | End: 2020-01-01

## 2020-01-01 RX ORDER — BISACODYL 5 MG
15 TABLET, DELAYED RELEASE (ENTERIC COATED) ORAL DAILY PRN
Status: DISCONTINUED | OUTPATIENT
Start: 2020-01-01 | End: 2020-01-01 | Stop reason: HOSPADM

## 2020-01-01 RX ORDER — AMOXICILLIN 250 MG
1 CAPSULE ORAL 2 TIMES DAILY
Status: DISCONTINUED | OUTPATIENT
Start: 2020-01-01 | End: 2020-01-01 | Stop reason: HOSPADM

## 2020-01-01 RX ORDER — NICOTINE POLACRILEX 4 MG
15-30 LOZENGE BUCCAL
Status: DISCONTINUED | OUTPATIENT
Start: 2020-01-01 | End: 2020-01-01 | Stop reason: HOSPADM

## 2020-01-01 RX ORDER — SODIUM CHLORIDE 9 MG/ML
INJECTION, SOLUTION INTRAVENOUS CONTINUOUS
Status: DISCONTINUED | OUTPATIENT
Start: 2020-01-01 | End: 2020-01-01

## 2020-01-01 RX ORDER — POLYETHYLENE GLYCOL 3350 17 G/17G
17 POWDER, FOR SOLUTION ORAL DAILY PRN
Status: DISCONTINUED | OUTPATIENT
Start: 2020-01-01 | End: 2020-01-01 | Stop reason: HOSPADM

## 2020-01-01 RX ORDER — VIT C/E/ZN/COPPR/LUTEIN/ZEAXAN 60 MG-6 MG
1 CAPSULE ORAL DAILY
COMMUNITY
End: 2020-01-01

## 2020-01-01 RX ORDER — SULFAMETHOXAZOLE/TRIMETHOPRIM 800-160 MG
1 TABLET ORAL 2 TIMES DAILY
Qty: 14 TABLET | Refills: 0 | Status: ON HOLD | OUTPATIENT
Start: 2020-01-01 | End: 2020-01-01

## 2020-01-01 RX ORDER — ACETAMINOPHEN 500 MG
1000 TABLET ORAL 3 TIMES DAILY
Status: DISCONTINUED | OUTPATIENT
Start: 2020-01-01 | End: 2020-01-01 | Stop reason: HOSPADM

## 2020-01-01 RX ORDER — ONDANSETRON 4 MG/1
4 TABLET, ORALLY DISINTEGRATING ORAL EVERY 6 HOURS PRN
Status: DISCONTINUED | OUTPATIENT
Start: 2020-01-01 | End: 2020-01-01 | Stop reason: HOSPADM

## 2020-01-01 RX ORDER — NALOXONE HYDROCHLORIDE 0.4 MG/ML
.1-.4 INJECTION, SOLUTION INTRAMUSCULAR; INTRAVENOUS; SUBCUTANEOUS
Status: DISCONTINUED | OUTPATIENT
Start: 2020-01-01 | End: 2020-01-01 | Stop reason: HOSPADM

## 2020-01-01 RX ORDER — LOVASTATIN 40 MG
TABLET ORAL
Qty: 90 TABLET | Refills: 1 | Status: SHIPPED | OUTPATIENT
Start: 2020-01-01 | End: 2020-01-01

## 2020-01-01 RX ORDER — ACETAMINOPHEN 500 MG
1000 TABLET ORAL ONCE
Status: COMPLETED | OUTPATIENT
Start: 2020-01-01 | End: 2020-01-01

## 2020-01-01 RX ORDER — PROCHLORPERAZINE MALEATE 5 MG
5 TABLET ORAL EVERY 6 HOURS PRN
Status: DISCONTINUED | OUTPATIENT
Start: 2020-01-01 | End: 2020-01-01 | Stop reason: HOSPADM

## 2020-01-01 RX ORDER — LOSARTAN POTASSIUM 50 MG/1
50 TABLET ORAL DAILY
Qty: 90 TABLET | Refills: 3 | Status: SHIPPED | OUTPATIENT
Start: 2020-01-01 | End: 2020-01-01

## 2020-01-01 RX ORDER — HYDRALAZINE HYDROCHLORIDE 20 MG/ML
10 INJECTION INTRAMUSCULAR; INTRAVENOUS EVERY 4 HOURS PRN
Status: DISCONTINUED | OUTPATIENT
Start: 2020-01-01 | End: 2020-01-01 | Stop reason: HOSPADM

## 2020-01-01 RX ORDER — NITROFURANTOIN 25; 75 MG/1; MG/1
100 CAPSULE ORAL 2 TIMES DAILY
COMMUNITY
Start: 2020-01-01 | End: 2020-01-01

## 2020-01-01 RX ORDER — VIT C/E/ZN/COPPR/LUTEIN/ZEAXAN 60 MG-6 MG
1 CAPSULE ORAL DAILY
Status: DISCONTINUED | OUTPATIENT
Start: 2020-01-01 | End: 2020-01-01 | Stop reason: HOSPADM

## 2020-01-01 RX ORDER — LOVASTATIN 40 MG
TABLET ORAL
Qty: 90 TABLET | Refills: 3 | Status: SHIPPED | OUTPATIENT
Start: 2020-01-01 | End: 2020-01-01

## 2020-01-01 RX ORDER — DEXTROSE MONOHYDRATE 25 G/50ML
25-50 INJECTION, SOLUTION INTRAVENOUS
Status: DISCONTINUED | OUTPATIENT
Start: 2020-01-01 | End: 2020-01-01 | Stop reason: HOSPADM

## 2020-01-01 RX ORDER — DONEPEZIL HYDROCHLORIDE 5 MG/1
TABLET, FILM COATED ORAL
Qty: 60 TABLET | Refills: 11 | Status: SHIPPED | OUTPATIENT
Start: 2020-01-01 | End: 2020-01-01

## 2020-01-01 RX ADMIN — CEFTRIAXONE SODIUM 1 G: 1 INJECTION, POWDER, FOR SOLUTION INTRAMUSCULAR; INTRAVENOUS at 11:51

## 2020-01-01 RX ADMIN — ACETAMINOPHEN 650 MG: 325 TABLET, FILM COATED ORAL at 22:36

## 2020-01-01 RX ADMIN — DONEPEZIL HYDROCHLORIDE 5 MG: 5 TABLET, FILM COATED ORAL at 09:48

## 2020-01-01 RX ADMIN — MICONAZOLE NITRATE: 20 POWDER TOPICAL at 22:11

## 2020-01-01 RX ADMIN — ASPIRIN 81 MG: 81 TABLET, COATED ORAL at 21:15

## 2020-01-01 RX ADMIN — DOCUSATE SODIUM 50 MG AND SENNOSIDES 8.6 MG 2 TABLET: 8.6; 5 TABLET, FILM COATED ORAL at 21:34

## 2020-01-01 RX ADMIN — SODIUM CHLORIDE: 9 INJECTION, SOLUTION INTRAVENOUS at 22:37

## 2020-01-01 RX ADMIN — SODIUM CHLORIDE: 9 INJECTION, SOLUTION INTRAVENOUS at 23:48

## 2020-01-01 RX ADMIN — DOCUSATE SODIUM 50 MG AND SENNOSIDES 8.6 MG 2 TABLET: 8.6; 5 TABLET, FILM COATED ORAL at 10:14

## 2020-01-01 RX ADMIN — ASPIRIN 81 MG: 81 TABLET, COATED ORAL at 09:48

## 2020-01-01 RX ADMIN — Medication 1 CAPSULE: at 07:59

## 2020-01-01 RX ADMIN — LOSARTAN POTASSIUM 50 MG: 50 TABLET, FILM COATED ORAL at 22:45

## 2020-01-01 RX ADMIN — QUETIAPINE 12.5 MG: 25 TABLET, FILM COATED ORAL at 17:28

## 2020-01-01 RX ADMIN — Medication 1 MG: at 22:45

## 2020-01-01 RX ADMIN — ASPIRIN 81 MG: 81 TABLET, COATED ORAL at 21:34

## 2020-01-01 RX ADMIN — QUETIAPINE 12.5 MG: 25 TABLET, FILM COATED ORAL at 17:53

## 2020-01-01 RX ADMIN — Medication 1 CAPSULE: at 08:49

## 2020-01-01 RX ADMIN — ACETAMINOPHEN 1000 MG: 500 TABLET, FILM COATED ORAL at 08:42

## 2020-01-01 RX ADMIN — ACETAMINOPHEN 1000 MG: 500 TABLET, FILM COATED ORAL at 17:25

## 2020-01-01 RX ADMIN — SODIUM CHLORIDE, POTASSIUM CHLORIDE, SODIUM LACTATE AND CALCIUM CHLORIDE: 600; 310; 30; 20 INJECTION, SOLUTION INTRAVENOUS at 21:21

## 2020-01-01 RX ADMIN — LOSARTAN POTASSIUM 50 MG: 50 TABLET, FILM COATED ORAL at 22:39

## 2020-01-01 RX ADMIN — ACETAMINOPHEN 1000 MG: 500 TABLET, FILM COATED ORAL at 21:34

## 2020-01-01 RX ADMIN — INSULIN ASPART 1 UNITS: 100 INJECTION, SOLUTION INTRAVENOUS; SUBCUTANEOUS at 16:39

## 2020-01-01 RX ADMIN — SODIUM CHLORIDE: 9 INJECTION, SOLUTION INTRAVENOUS at 08:38

## 2020-01-01 RX ADMIN — ACETAMINOPHEN 1000 MG: 500 TABLET, FILM COATED ORAL at 10:14

## 2020-01-01 RX ADMIN — Medication 1 MG: at 21:34

## 2020-01-01 RX ADMIN — ACETAMINOPHEN 1000 MG: 500 TABLET, FILM COATED ORAL at 16:06

## 2020-01-01 RX ADMIN — CEFTRIAXONE SODIUM 1 G: 1 INJECTION, POWDER, FOR SOLUTION INTRAMUSCULAR; INTRAVENOUS at 12:08

## 2020-01-01 RX ADMIN — ACETAMINOPHEN 1000 MG: 500 TABLET, FILM COATED ORAL at 21:15

## 2020-01-01 RX ADMIN — Medication 1 MG: at 22:36

## 2020-01-01 RX ADMIN — Medication 1 MG: at 21:15

## 2020-01-01 RX ADMIN — ACETAMINOPHEN 1000 MG: 500 TABLET, FILM COATED ORAL at 09:48

## 2020-01-01 RX ADMIN — LOSARTAN POTASSIUM 50 MG: 50 TABLET, FILM COATED ORAL at 21:15

## 2020-01-01 RX ADMIN — Medication 1 MG: at 22:39

## 2020-01-01 RX ADMIN — DONEPEZIL HYDROCHLORIDE 5 MG: 5 TABLET, FILM COATED ORAL at 10:14

## 2020-01-01 RX ADMIN — DOCUSATE SODIUM 50 MG AND SENNOSIDES 8.6 MG 2 TABLET: 8.6; 5 TABLET, FILM COATED ORAL at 08:42

## 2020-01-01 RX ADMIN — Medication 1 CAPSULE: at 08:38

## 2020-01-01 RX ADMIN — ASPIRIN 81 MG: 81 TABLET, COATED ORAL at 10:14

## 2020-01-01 RX ADMIN — ACETAMINOPHEN 1000 MG: 500 TABLET, FILM COATED ORAL at 12:35

## 2020-01-01 RX ADMIN — DOCUSATE SODIUM 50 MG AND SENNOSIDES 8.6 MG 1 TABLET: 8.6; 5 TABLET, FILM COATED ORAL at 21:14

## 2020-01-01 RX ADMIN — ASPIRIN 81 MG: 81 TABLET, COATED ORAL at 08:42

## 2020-01-01 RX ADMIN — LOSARTAN POTASSIUM 50 MG: 50 TABLET, FILM COATED ORAL at 21:34

## 2020-01-01 RX ADMIN — SODIUM CHLORIDE, POTASSIUM CHLORIDE, SODIUM LACTATE AND CALCIUM CHLORIDE: 600; 310; 30; 20 INJECTION, SOLUTION INTRAVENOUS at 14:32

## 2020-01-01 RX ADMIN — DONEPEZIL HYDROCHLORIDE 5 MG: 5 TABLET, FILM COATED ORAL at 08:42

## 2020-01-01 RX ADMIN — CEFTRIAXONE SODIUM 1 G: 1 INJECTION, POWDER, FOR SOLUTION INTRAMUSCULAR; INTRAVENOUS at 13:52

## 2020-01-01 RX ADMIN — LOSARTAN POTASSIUM 50 MG: 50 TABLET, FILM COATED ORAL at 22:36

## 2020-01-01 RX ADMIN — SODIUM CHLORIDE 1000 ML: 9 INJECTION, SOLUTION INTRAVENOUS at 17:30

## 2020-01-01 RX ADMIN — ACETAMINOPHEN 1000 MG: 500 TABLET, FILM COATED ORAL at 17:50

## 2020-01-01 ASSESSMENT — ENCOUNTER SYMPTOMS
DYSURIA: 0
ABDOMINAL PAIN: 0
HEADACHES: 0
SHORTNESS OF BREATH: 0
BACK PAIN: 0
NECK PAIN: 0
VOMITING: 0
COUGH: 0
NUMBNESS: 0
BLOOD IN STOOL: 0
ABDOMINAL PAIN: 0
ARTHRALGIAS: 1
CHILLS: 0
SHORTNESS OF BREATH: 0
FEVER: 0
DIFFICULTY URINATING: 0
HEADACHES: 0
DIARRHEA: 0
LIGHT-HEADEDNESS: 0
APPETITE CHANGE: 0
NECK PAIN: 0

## 2020-01-01 ASSESSMENT — COLUMBIA-SUICIDE SEVERITY RATING SCALE - C-SSRS
2. HAVE YOU ACTUALLY HAD ANY THOUGHTS OF KILLING YOURSELF IN THE PAST MONTH?: NO
6. HAVE YOU EVER DONE ANYTHING, STARTED TO DO ANYTHING, OR PREPARED TO DO ANYTHING TO END YOUR LIFE?: NO
1. IN THE PAST MONTH, HAVE YOU WISHED YOU WERE DEAD OR WISHED YOU COULD GO TO SLEEP AND NOT WAKE UP?: NO
3. HAVE YOU BEEN THINKING ABOUT HOW YOU MIGHT KILL YOURSELF?: NO
4. HAVE YOU HAD THESE THOUGHTS AND HAD SOME INTENTION OF ACTING ON THEM?: NO
7. DID THIS OCCUR IN THE LAST THREE MONTHS: NO
5. HAVE YOU STARTED TO WORK OUT OR WORKED OUT THE DETAILS OF HOW TO KILL YOURSELF? DO YOU INTEND TO CARRY OUT THIS PLAN?: NO

## 2020-01-01 ASSESSMENT — MIFFLIN-ST. JEOR
SCORE: 1077.21
SCORE: 1097.62
SCORE: 1095.35
SCORE: 1121.21
SCORE: 1069.5
SCORE: 1129.83
SCORE: 1098.08
SCORE: 1098.08
SCORE: 1120.76
SCORE: 1101
SCORE: 1069.5
SCORE: 1075.4
SCORE: 1109.42

## 2020-01-08 NOTE — TELEPHONE ENCOUNTER
Prescription approved per [facility] Refill Protocol.  TA Jansen

## 2020-03-24 NOTE — ED AVS SNAPSHOT
Emergency Department  [address] [place] [id number]  Phone:  [phone]  Fax:  [phone]                                    Reanna Garza   MRN: [id number]    Department:   Emergency Department   Date of Visit:  3/24/2020           After Visit Summary Signature Page    I have received my discharge instructions, and my questions have been answered. I have discussed any challenges I see with this plan with the nurse or doctor.    ..........................................................................................................................................  Patient/Patient Representative Signature      ..........................................................................................................................................  Patient Representative Print Name and Relationship to Patient    ..................................................               ................................................  Date                                   Time    ..........................................................................................................................................  Reviewed by Signature/Title    ...................................................              ..............................................  Date                                               Time          22EPIC Rev 08/18

## 2020-03-24 NOTE — ED NOTES
Bed: ED21  Expected date:   Expected time:   Means of arrival:   Comments:  424  89 F R hip pain after fall  1111

## 2020-03-24 NOTE — ED TRIAGE NOTES
Pt BIBA from home. Per EMS Pt had unwitnessed fall around 0600. Pt now c/o R shin & R hip pain. Pt minimally ambulatory with walker per EMS report from daughter who is primary care giver at home. Hx of dementia. No current complaints  EMS thought Pt may have been having runs of a-fib in route. Pt  Has no previous hx of this. Pt

## 2020-03-24 NOTE — TELEPHONE ENCOUNTER
Daughter calls to report 1AM fall, fall was not seen, she was helped from bathroom to bed, right leg above the knee, daughter does not want to go look at the leg, RN unable to complete  Triage, advised if she is unale to bear weight then call 911 to bring her in.  Call ended    Radhika Pizarro RN - [place] Nurse Advisor  03/24/20   6:40AM    Additional Information    Negative: Serious injury with multiple fractures    Negative: [1] Major bleeding (e.g., actively dripping or spurting) AND [2] can't be stopped    Negative: Bullet wound, stabbed by knife, or other serious penetrating wound    Negative: Looks like a dislocated joint (crooked or deformed)    Negative: Can't stand (bear weight) or walk    Negative: Sounds like a life-threatening emergency to the triager    Protocols used: LEG INJURY-A-AH

## 2020-03-24 NOTE — ED PROVIDER NOTES
"  History     Chief Complaint:  Fall      The history is provided by the patient and the EMS personnel. The history is limited by the condition of the patient (Dementia).      Reanna Garza is a 89 year old female with a history of vascular dementia, CVA, TIA, type 2 diabetes, hypertension, and DJD, who presents via EMS for evaluation after having an unwitnessed fall 5 hours ago. Patient had complained of right shin and right hip pain which prompted her daughter to call EMS, though says she feels \"good\" when laying down. Her only concern is right hip discomfort when ambulating.  Of note, patient is minimally ambulatory with a walker at baseline. Reanna lives at home with her daughter who is her primary care giver. She was able to ambulate on her right leg for EMS.  Denies shortness of breath, chest pain, abdominal pain, numbness to extremities, arm pain, neck pain, headache, or any other concerns. Patient takes aspirin daily but no other blood thinning medications.     Allergies:  Codeine  Penicillins   Codeine sulfate     Medications:    Aspirin 162 mg   Glipizide  Hydrochlorothiazide   Cozaar  Mevacor     Past Medical History:    Abdominal aneurysm without mention of rupture  Arthritis  CVA  DJD  HTN  HLD  Stroke   DM 2  Vascular dementia without behavior disturbance  TIA  Vitamin D deficiency   Chronic rhinitis  Macular degeneration of retina   Lumbar spondylosis   Trochanteric bursitis  Imbalance RLS    Past Surgical History:    Blepharoplasty   Cholecystoenterostomy  Total abdominal hysterectomy  Total knee arthroplasty x2  Shoulder arthroscopy   Replacement shoulder socket     Family History:    Breast cancer  Other cancer  Alzheimer    Social History:  Smoking status: former   Alcohol use: yes   Drug use: no   PCP: Chelita Curtis  Marital Status:        Review of Systems   Respiratory: Negative for shortness of breath.    Cardiovascular: Negative for chest pain.   Gastrointestinal: Negative " "for abdominal pain.   Musculoskeletal: Positive for arthralgias. Negative for neck pain.   Neurological: Negative for numbness and headaches.   All other systems reviewed and are negative.      Physical Exam     Patient Vitals for the past 24 hrs:   BP Temp Temp src Pulse Heart Rate Resp SpO2 Height   03/24/20 1200 134/66 -- -- 54 56 19 99 % --   03/24/20 1129 136/60 97.8  F (36.6  C) Oral 58 -- 16 -- 1.626 m (5' 4\")        Physical Exam  General: Alert and cooperative with exam. Patient in mild distress.  Baseline mentation.  Head:  Scalp is NC/AT  Eyes:  No scleral icterus, PERRL, EOMI   ENT:  The external nose and ears are normal. The oropharynx is normal and without erythema; mucus membranes are moist.   Neck:  Normal range of motion without rigidity.  No midline cervical tenderness  CV:  Mildly bradycardic with regular rhythm  Resp:  Breath sounds are clear bilaterally    Non-labored, no retractions or accessory muscle use  GI:  Abdomen is soft, no distension, no tenderness. No peritoneal signs  MS:  No lower extremity edema.  Full active/passive ROM to the lower extremities without significant pain.  Patient able to bear weight and ambulate in the ED.  CMS intact to lower extremities  Skin:  Warm and dry, No rash or lesions noted.  Neuro: Oriented and alert. No gross motor deficits.    Strength and sensation grossly intact in all 4 extremities.      Cranial nerves 2-12 intact.    GCS: 15    Emergency Department Course     Imaging:  Radiographic findings were communicated with the patient who voiced understanding of the findings.    XR Pelvis w Hip Right 1 View  No fracture or acute abnormality is demonstrated. Again   seen are prominent degenerative changes of the right hip joint and to   a mild degree the left hip. No other abnormality is seen. I see no   definite change since the previous examination.   As read by Radiology.    Laboratory:  Glucose by meter: 78    Interventions:  1235: Tylenol 1000 mg "     Emergency Department Course:  1134: Nursing notes and vitals reviewed. I performed an exam of the patient as documented above.     The patient was sent for a right hip xray while in the emergency department, findings above.     1228: I rechecked the patient and discussed the results of her workup thus far.     1230: I spoke with the patient's daughter and updated her on the plan.     Findings and plan explained to the Patient. Patient discharged home with instructions regarding supportive care, medications, and reasons to return. The importance of close follow-up was reviewed.     I personally answered all related questions prior to discharge.      Impression & Plan      Medical Decision Making:  Reanna Garza is a 89 year old female who presents for evaluation following a fall. Workup  included pelvis and hip xray. Full examination revealed no external evidence of trauma and normal neurological status.  X-Ray imaging fortunately did not reveal any fracture or abnormality. In terms of head injury, the patient had no direct injury to the head, is not on any blood thinners, has no headache, and has a non dangerous mechanism, therefore no advanced imaging was undertaken.  In terms of neck injury, the patient is able to be cleared by NEXUS criteria.  The patient and daughter were given return precautions and follow up instructions, they state understanding of these and ability to comply. With reasonable clinical certainty, I believe the patient is safe for discharge and can be safely managed as an outpatient. All questions answered prior to discharge.  Recommended supportive care for right hip arthralgia including ice and Tylenol as needed for pain control.    Diagnosis:    ICD-10-CM    1. Hip pain, right  M25.551    2. Fall, initial encounter  W19.XXXA        Disposition:  discharged to home    Emma TRUJILLO, am serving as a scribe at 11:34 AM on 3/24/2020 to document services personally performed by Gina  Rober RAMIREZ based on my observations and the provider's statements to me.       Emma Berry  3/24/2020    EMERGENCY DEPARTMENT       Rober Fuentes DO  03/24/20 7506

## 2020-06-06 NOTE — TELEPHONE ENCOUNTER
NEEDS VISIT AND LABS FOR FURTHER REFILLS.  SENT IN 30 DAY SUPPLY.    Reception- Please help pt schedule a virtual diab appt.  Thanks!  TA Jansen

## 2020-07-14 NOTE — PROGRESS NOTES
"Reanna Garza is a 89 year old female who is being evaluated via a billable telephone visit.      The patient has been notified of following:     \"This telephone visit will be conducted via a call between you and your physician/provider. We have found that certain health care needs can be provided without the need for a physical exam.  This service lets us provide the care you need with a short phone conversation.  If a prescription is necessary we can send it directly to your pharmacy.  If lab work is needed we can place an order for that and you can then stop by our lab to have the test done at a later time.    Telephone visits are billed at different rates depending on your insurance coverage. During this emergency period, for some insurers they may be billed the same as an in-person visit.  Please reach out to your insurance provider with any questions.    If during the course of the call the physician/provider feels a telephone visit is not appropriate, you will not be charged for this service.\"    Patient has given verbal consent for Telephone visit?  Yes    What phone number would you like to be contacted at? [phone]    How would you like to obtain your AVS? Mail a copy    Subjective     Reanna Garza is a 89 year old female who presents via phone visit today for the following health issues:    HPI     Hypertension Follow-up      Do you check your blood pressure regularly outside of the clinic? No     Are you following a low salt diet? Yes    Are your blood pressures ever more than 140 on the top number (systolic) OR more   than 90 on the bottom number (diastolic), for example 140/90? No   Pt is checking blood sugars which have been between 100-115    URINARY TRACT SYMPTOMS      Duration: 1 week    Description  frequency, odor, incontinence, interrupted flow of urine (urinates, stops, urinates again) and loss of balance, fell out of bed once, more irratable    Intensity:  " moderate    Accompanying signs and symptoms:  Fever/chills: YES, asking to turn on heat  Flank pain YES- right side  Nausea and vomiting: no   Vaginal symptoms: none  Abdominal/Pelvic Pain: no     History  History of frequent UTI's: no   History of kidney stones: no   Sexually Active: no   Possibility of pregnancy: No    Precipitating or alleviating factors: None    Therapies tried and outcome: increased fluids   Outcome: not much change    Visit conducted on speaker phone with pt Reanna and her daughter Frandy whom she lives with.  Pt frequently becomes upset during the conversation, stating her daughter is lying or not telling the truth.  Daughter has to take pt off speaker phone to complete visit.      Last visit with PCP Dr. Curtis > 1 year ago.  Reanna lives with her  and her daughter frandy.  She hase 7 children, 3 of them are involved in caring for them.  Daughter notes Reanna gets angry with daughter and  all the time.  Feels like she is stuck back in time.  Has never seen dementia doctor.    Frandy cooks her food and does her shopping.  Reanna ambulates with a walker.  They live in single family home.  Other sister manages finances.  Her daughter assists with bathing.  Reanna is able to toilet on her own, needs help pulling pants up.  Frandy sets up pills.    HTN- needs med refills.  She is on hydrochlorothiazide and losartan.  Normal blood pressure 1mo ago, was checked in the hospital for her .  No dizziness, lightheadedness, chest pain, or shortness of breath.      DM2- she is on glipizide half tab twice a day.  A1C 5.3% 5/2019.  She is on statin and ARB.  Blood sugars are 100,119, 127, 93 around 11am after eating breakfast.  Since cutting pills (glipizide) in half no issues with lows.  Highest was 141 after breakfast.  Tried to stop glipizide before and dementia worsened.      History of stroke 2015.    Sees retinal doctor for macular degeneration.  Goes every 5-6 weeks for shots into eyes.   Has had to miss appts due to not being able to get her into vehicle.  Takes 1h to get into car.    Urinary symptoms x 1 week.  Going more frequently, also having incontinence.  No dysuria.  Urine color is different.  Daughter feels balance is off.  No fever.  Does seem more chilled, asking heat to be turned on.  No hx frequent or recurrent UTI.  No hx constipation.      Wed and fri when frandy woke up pt was laying on floor.  Fire department had to come and pick her up.  Almost fell today in the bathroom.  Balance issues are new in the last week, thinks related to UTI.  No hx of frequent falls prior to this past week.      Patient Active Problem List   Diagnosis     Abdominal aortic aneurysm (H)     Restless legs syndrome (RLS)     Abnormality of gait     Imbalance     HYPERLIPIDEMIA LDL GOAL <100     Low back pain     Lumbar spondylosis     Right hip pain     Trochanteric bursitis     Hypertension goal BP (blood pressure) < 140/90     Shoulder pain     Disturbance of skin sensation     Macular degeneration (senile) of retina     Chronic rhinitis     Stroke (H)     Vitamin D deficiency     Advanced care planning/counseling discussion     Type 2 diabetes mellitus without complication (H)     Confusion     History of TIA (transient ischemic attack) and stroke     Vascular dementia without behavioral disturbance (H)     Hypokalemia     Past Surgical History:   Procedure Laterality Date     BLEPHAROPLASTY, BROW LIFT, COMBINED  6/10/2013    Procedure: COMBINED BLEPHAROPLASTY, BROW LIFT;  BILATERAL BLEPHAROPLASTY WITH INTERNAL BROW LIFT;  Surgeon: Chip Dai MD;  Location: [facility]     C CHOLECYSTOENTEROSTOMY      thaddeus     C TOTAL ABDOM HYSTERECTOMY       C TOTAL KNEE ARTHROPLASTY  6/04    x2     EXTRACAPSULAR CATARACT EXTRATION WITH INTRAOCULAR LENS IMPLANT      both eyes     HC SHOULDER ARTHROSCOPY, DX  12/05    rotator cuff tear repair     REPLACEMENT SOCKET, SHOULDER DISARTICULATION/INTERSCAPULAR THORACIC, MOLDED  TO      right       Social History     Tobacco Use     Smoking status: Former Smoker     Last attempt to quit: 1999     Years since quittin.6     Smokeless tobacco: Never Used   Substance Use Topics     Alcohol use: Yes     Comment: rare     Family History   Problem Relation Age of Onset     Cancer Brother      Cancer Sister      Breast Cancer Sister      Cancer Sister      Cancer Sister      Cancer Sister      Alzheimer Disease Sister          Current Outpatient Medications   Medication Sig Dispense Refill     ACCU-CHEK PATTI PLUS test strip        acetaminophen 650 MG TABS Take 650 mg by mouth every 4 hours as needed for mild pain or fever 100 tablet      ARTIFICIAL TEARS 0.1-0.3 % SOLN Apply to eye every 6 hours as needed       aspirin  MG EC tablet Take 1 tablet (162 mg) by mouth daily       blood glucose (ACCU-CHEK PATTI PLUS) test strip USE AS DIRECTED TO TEST BLOOD SUGAR once daily or as directed. 90 strip 1     blood glucose monitoring (ACCU-CHEK PATTI PLUS) meter device kit Use to test blood sugars 4 times daily or as directed. 1 kit 0     blood glucose monitoring (ACCU-CHEK MULTICLIX) lancets Use to test blood sugar 4 times daily. 400 each 3     Blood Glucose Monitoring Suppl (ACCU-CHEK PATTI) DEANNA 1 Device daily       blood glucose test strip Use to test blood sugar 2 times daily or as directed. 1 Box prn     calcium citrate-vitamin D (CALCIUM + D) 315-200 MG-UNIT TABS Take 2 tablets by mouth daily 60 tablet      Cholecalciferol (VITAMIN D) 2000 UNITS tablet Take 4,000 Units by mouth daily 100 tablet 3     glipiZIDE (GLUCOTROL) 5 MG tablet Take 0.5 tablets (2.5 mg) by mouth 2 times daily 45 tablet 3     hydrochlorothiazide (MICROZIDE) 12.5 MG capsule TAKE 1 CAPSULE BY MOUTH EVERY DAY 90 capsule 3     Ibuprofen (ADVIL PO)        losartan (COZAAR) 50 MG tablet Take 1 tablet (50 mg) by mouth daily 90 tablet 3     lovastatin (MEVACOR) 40 MG tablet TAKE 1 TABLET BY MOUTH EVERYDAY AT BEDTIME  90 tablet 3     sulfamethoxazole-trimethoprim (BACTRIM DS) 800-160 MG tablet Take 1 tablet by mouth 2 times daily 14 tablet 0       Reviewed and updated as needed this visit by Provider         Review of Systems   Constitutional, HEENT, cardiovascular, pulmonary, gi and gu systems are negative, except as otherwise noted.       Objective   Reported vitals:  There were no vitals taken for this visit.   healthy, alert and no distress  PSYCH: becomes agitated and angry during visit, unable to answer questions  RESP: No cough, no audible wheezing, able to talk in full sentences  Remainder of exam unable to be completed due to telephone visits    Diagnostic Test Results:  Admission on 03/24/2020, Discharged on 03/24/2020   Component Date Value Ref Range Status     Glucose 03/24/2020 78  70 - 99 mg/dL Final             Assessment/Plan:  (F01.51) Vascular dementia with behavior disturbance (H)  (primary encounter diagnosis)  Comment: living in single family home with daughter and , dependent on them for ADLs.  Some emotional/behavioral concerns, daughter reports pt is easy to anger, this is evident during our visit today.  Daughter is wondering about a home health aid.  Daughter does med set up.  Plan: CARE COORDINATION REFERRAL, MEMORY CLINIC         REFERRAL        Care coordinator referral to assist with process of obtaining home health aid.  Daughter is interested in establishing with memory clinic to address behaviors.  Lack of time to get into more detail today but should be assessed for underlying depression, also would be interested in knowing more about sleep habits.  Will schedule for 2-4 week follow up.  In the meantime will refer to memory clinic.    (R35.0) Urinary frequency  Comment: concern for UTI  Plan: sulfamethoxazole-trimethoprim (BACTRIM DS)         800-160 MG tablet        Given current pandemic we elected to treat empirically without UA.  Start 7d course bactrium (pt has normal kindey function)  and follow up in 2 weeks to reassess.      (R26.89) Balance problems  (R29.6) Falls frequently  Comment: balance has been off the last week, daughter thinks related to UTI.  Has fallen out of bed twice and had to call fire department for a lift  Plan: reassess at follow up visit, if not improved with UTI treatment will initiate home care physical therapy eval    (I10) Hypertension goal BP (blood pressure) < 140/90  Comment: controlled, we have normal readings in [facility] 3/2020 and daughter reports normal reading 1mo ago.  No symptoms of hypotension or ACS  Plan: hydrochlorothiazide (MICROZIDE) 12.5 MG         capsule, losartan (COZAAR) 50 MG tablet,         Albumin Random Urine Quantitative with Creat         Ratio, Basic metabolic panel, Lipid panel         reflex to direct LDL Fasting        Refilled, we elected to defer labs 4mo due to pandemic     (E11.9) Type 2 diabetes mellitus without complication, without long-term current use of insulin (H)  Comment: taking glipizide 2.5mg twice a day, have tried stopping in the past and dementia worsened.  No hypoglycemia  Plan: Hemoglobin A1c, Lipid panel reflex to direct         LDL Fasting, glipiZIDE (GLUCOTROL) 5 MG tablet        Cont glipizide at current dose, labs in 4mo    (E78.5) Hyperlipidemia LDL goal <100  Comment:   Plan: lovastatin (MEVACOR) 40 MG tablet        refilled    (Z74.09) Mobility impaired  Comment: daughter notes difficulty transporting to appts, takes 1h to get into car  Plan: care coordinator referral to address transportation issues        No follow-ups on file.      Phone call duration:  31 minutes    MARGARITA Roger CNP

## 2020-07-15 NOTE — PROGRESS NOTES
Clinic Care Coordination Contact  [facility]/Voicemail    Clinical Data: Care Coordinator Outreach  Outreach attempted x 1.  Left message on patient's daughter's voicemail with call back information and requested return call.  Plan: Care Coordinator will try to reach patient again in 1-2 business days.      Reason for Referral: difficulty transporting patient, requesting home health aid. Pt with dementia, lives at home with  and daughter  Additional pertinent details: Please call pt's daughter Belle ([phone])  Clinical Staff have discussed the Care Coordination Referral with the patient and/or caregiver: yes

## 2020-07-15 NOTE — LETTER
July 20, 2020    Dear Reanna,                                                                         You were recently referred to the [facility]'s Clinic Care Coordination service.  This is a service offered through your Primary Care Clinic which can help you access resources and services in regard to your health and well-being goals. The clinic Community Health Worker has placed two calls to you to discuss the nature of this service and to offer enrollment.  If you are interested in learning more about Clinic Care Coordination, please call your Primary Care Clinic's Community Health Worker, Isabel, at [phone].                                                    Sincerely,                                                                              JESÚS Hall                                                                                          Clinic Care Coordination                                                  [facility]

## 2020-07-16 PROBLEM — E87.1 HYPONATREMIA: Status: ACTIVE | Noted: 2020-01-01

## 2020-07-16 NOTE — ED TRIAGE NOTES
Generalized weakness since Tuesday. Seen at PMD dx w/ UTI, on Abx x4 doses. EMS called yesterday but declined transfer to ED by family. EMS called again today and brought here. Increased diff walking and transferring. Dementia, pt at baseline per report

## 2020-07-16 NOTE — PROGRESS NOTES
RECEIVING UNIT ED HANDOFF REVIEW    ED Nurse Handoff Report was reviewed by: Eugene Campbell RN on July 16, 2020 at 5:32 PM

## 2020-07-16 NOTE — ED NOTES
Rn called and spoke to daughter Belle on the phone, she states pt has been complaining of L hip pain and having troubles walking/bearing weight on that leg. NP notified

## 2020-07-16 NOTE — ED NOTES
Bed: ED27  Expected date:   Expected time:   Means of arrival:   Comments:  [facility] - 448 - 89 F weakness no covid eta 1352

## 2020-07-16 NOTE — PHARMACY-ADMISSION MEDICATION HISTORY
Pharmacy Medication History  Admission medication history interview status for the 7/16/2020  admission is complete. See EPIC admission navigator for prior to admission medications     Medication history sources: Patient's family/friend (Beka Odonnell [phone] )  Medication history source reliability: Good  Adherence assessment: Good    Significant changes made to the medication list:        Additional medication history information:       Medication reconciliation completed by provider prior to medication history? No    Time spent in this activity: 25min       Prior to Admission medications    Medication Sig Last Dose Taking? Auth Provider   aspirin 81 MG EC tablet Take 81 mg by mouth 2 times daily 7/16/2020 at am Yes Unknown, Entered By History   calcium citrate-vitamin D (CITRACAL) 315-200 MG-UNIT TABS per tablet Take 1 tablet by mouth daily 7/16/2020 at Unknown time Yes Unknown, Entered By History   glipiZIDE (GLUCOTROL) 5 MG tablet Take 0.5 tablets (2.5 mg) by mouth 2 times daily 7/16/2020 at am Yes Waleska Cervantes APRN CNP   hydrochlorothiazide (MICROZIDE) 12.5 MG capsule TAKE 1 CAPSULE BY MOUTH EVERY DAY 7/16/2020 at Unknown time Yes Waleska Cervantes APRN CNP   Ibuprofen (ADVIL PO) Take 200 mg by mouth every 6 hours as needed  7/16/2020 at Unknown time Yes Reported, Patient   losartan (COZAAR) 50 MG tablet Take 50 mg by mouth At Bedtime 7/15/2020 at Unknown time Yes Unknown, Entered By History   lovastatin (MEVACOR) 40 MG tablet TAKE 1 TABLET BY MOUTH EVERYDAY AT BEDTIME 7/15/2020 at Unknown time Yes Waleska Cervantes APRN CNP   melatonin 3 MG tablet Take 6 mg by mouth At Bedtime 7/15/2020 at Unknown time Yes Unknown, Entered By History   multivitamin (CENTRUM SILVER) tablet Take 1 tablet by mouth daily 7/16/2020 at Unknown time Yes Unknown, Entered By History   multivitamin (OCUVITE) TABS tablet Take 1 tablet by mouth daily 7/16/2020 at Unknown time Yes Unknown, Entered By History    sulfamethoxazole-trimethoprim (BACTRIM DS) 800-160 MG tablet Take 1 tablet by mouth 2 times daily  Patient taking differently: Take 1 tablet by mouth 2 times daily 7/16: after today she has 3 days left 7/16/2020 at am Yes Waleska Cervantes APRN CNP   vitamin D3 (CHOLECALCIFEROL) 50 mcg (2000 units) tablet Take 1 tablet by mouth 2 times daily 7/16/2020 at Unknown time Yes Unknown, Entered By History   acetaminophen 650 MG TABS Take 650 mg by mouth every 4 hours as needed for mild pain or fever   Albin Patel MD   blood glucose (ACCU-CHEK PATTI PLUS) test strip USE AS DIRECTED TO TEST BLOOD SUGAR once daily or as directed.   Chelita Curtis MD   blood glucose monitoring (ACCU-CHEK PATTI PLUS) meter device kit Use to test blood sugars 4 times daily or as directed.   Tristian Tyson MD   blood glucose monitoring (ACCU-CHEK MULTICLIX) lancets Use to test blood sugar 4 times daily.   Chelita Curtis MD   Blood Glucose Monitoring Suppl (ACCU-CHEK PATTI) DEANNA 1 Device daily   Chelita Curtis MD   blood glucose test strip Use to test blood sugar 2 times daily or as directed.   Chelita Curtis MD Marci Jacobson PharmD

## 2020-07-16 NOTE — ED PROVIDER NOTES
History     Chief Complaint:  Generalized Weakness    The history is limited by the condition of the patient.      Reanna Garza is a 89 year old female with a history of dementia stroke, type II diabetes, and transient ischemic attack who presents to the emergency department for evaluation of generalized weakness. The patient presents today with generalized weakness for the last 2 days that prompted her daughter to call EMS today. She also mentions that she recently fell, but denies hitting her head. The patient was recently seen at [facility] and was diagnosed with a UTI. She was prescribed a course of antibiotics and has completed 4 doses. She denies any chest pain, abdominal pain or headache. She states that she is unsure why she is here and is not able to provide any more history.     I spoke with the daughter Belle who lives with her parents and cares for Reanna. She notes that the patient typically ambulates with a walker but the past 3 days has been unable to do so. She has also fallen and had called EMS to help lift her mom last Wednesday and Friday, yesterday and today. She noted her mothers urine to be foul smelling and had a virtual visit where she was prescribed Bactrim for suspected UTI. No fever. No dysuria. She feels her mothers weakness has rapidly progressed over the past 3 days. She also notes her mother has complained abut left hip pain recently.     Allergies:  Codeine  Codeine Sulfate  Penicillins  Strawberry    Medications:    Aspirin  Glipizide  Microzide  Cozaar  Mevacor  Bactrim    Past Medical History:    Abdominal aneurysm   Arthritis  Stroke  Degenerative joint disease   Hyperlipidemia  Hypertension  Low back pain  Right hip pain  Diabetes type II  Restless leg syndrome  Vascular dementia  Transient ischemic attack    Past Surgical History:    Blepharoplasty  Cholecystectomy  Hysterectomy  Arthroplasty x2  Cataract IOL  Shoulder arthroscopy   Shoulder replacement    Family History:     Brother: cancer  Sister(s): cancer, Alzheimer disease    Social History:  The patient was accompanied to the ED by EMS.  Smoking Status: Former  Smokeless Tobacco: Never  Alcohol Use: Yes  Drug Use: No  Marital Status:       Review of Systems   Unable to perform ROS: Dementia         Physical Exam   Vitals:  Patient Vitals for the past 24 hrs:   BP Temp Temp src Pulse Heart Rate Resp SpO2 Weight   07/16/20 1545 -- -- -- -- 59 19 -- --   07/16/20 1530 129/63 -- -- 62 62 30 97 % --   07/16/20 1500 135/62 -- -- 58 59 -- 97 % --   07/16/20 1445 -- -- -- -- 56 13 95 % --   07/16/20 1430 122/73 -- -- 57 57 18 95 % --   07/16/20 1415 139/53 -- -- -- 57 17 96 % --   07/16/20 1404 139/53 97.3  F (36.3  C) Temporal 60 -- 16 97 % 65.3 kg (144 lb)       Physical Exam  Physical Exam   Constitutional: Pt appears well-developed and well-nourished.  Head: Atraumatic. Head moves freely with normal range of motion. No battles signs. No Racoons eyes.   ENT: Oropharynx is clear and moist. Nose with no deformity. No hemotympanum.  Eyes: Conjunctivae pink. EOMs intact. Pupils are equal, round, and reactive to light.  Neck: Normal range of motion. No midline C Spine tenderness, step-off or crepitus.   Cardiovascular: Regular rate and rhythm. Normal heart sounds. No concerning  murmur heard. Intact distal pulses: radial pulses 2+ on the right, 2+ on the left.   Pulmonary/Chest: No respiratory distress.  Breath sounds normal. No decreased breath sounds. No wheezes. No rhonchi. No rales. No chest wall tenderness or crepitus.    Abdominal: Soft. Non-tender. No rebound, no guarding.   Musculoskeletal: No edema. No tenderness. Distal capillary refill and sensation intact.  Neurological: Oriented to person, place, and time. No focal deficits.   Skin: Skin is warm.     Emergency Department Course     Imaging:  Radiographic findings were communicated with the patient and family who voiced understanding of the findings.    CT Head WO  Contrast  No acute intracranial abnormality.  Reading per radiology.    XR Pelvis W Hip Left 1 View  Advanced right hip degenerative changes with complete loss  of joint space. This is unchanged.     Left hip joint space is fairly well-preserved with some early  acetabular marginal osteophyte formation. No evidence of acute  fracture.  Reading per radiology.    Laboratory:  CBC: HGB 11.1 (L)  o/w WNL. (WBC 7.1, )   BMP: Sodium 125 (L), Chloride 92 (L) Glucose 67 (L), GFR 48 (L) o/w AWNL (Creatinine 1.02)    UA with micro: Squamous Epithelial 2 (H) o/w negative     Asymptomatic COVID--19 Virus by PCR: Pending    Emergency Department Course:  Past medical records, nursing notes, and vitals reviewed.    1450 I performed an exam of the patient as documented above.     IV was inserted and blood was drawn for laboratory testing, results above.  The patient provided a urine sample here in the emergency department. This was sent for laboratory testing, findings above.  The patient was sent for a head CT while in the emergency department, results above.     1656 I called the daughter of the patient, Belle, and discussed the plan for admission with her and obtain further information regarding the patient.     1707 I spoke with Dr. Muhammad, hospitalist, who agreed to admit the patient.     Findings and plan explained to the Patient who consents to admission. Discussed the patient with Dr. Muhammad, who will admit the patient to a medical bed for further monitoring, evaluation, and treatment.    I personally reviewed the laboratory results with the Patient and answered all related questions prior to admission.      Impression & Plan      Medical Decision Making:  Reanna Garza is a 89 year old female with progressing weakness and frequent falls. Her daughter lives her mother (and father) and cares for her. She has needed assistance from paramedics 4 times in the past week as her mother was unable to get up after a fall.  CT head and Xray left hip with no acute findings. Work up with hyponatremia. IV NS given. Cath UA is negative for infectious process although she is on Bactrim currently/ 4 doses taken since 7/14. No fever. Normal WBC. Patient has dementia and is not able to give details of her presenting illness or past medical history. Dr Muhammad will admit.       Diagnosis:    ICD-10-CM    1. Hyponatremia  E87.1    2. Generalized muscle weakness  M62.81    3. Falls frequently  R29.6        Disposition:  Admitted to the hospital under the care of Scott Vázquez, am serving as a scribe on 7/16/2020 at 2:05 PM to personally document services performed by Conchis Maldonado NP based on my observations and the provider's statements to me.     Scott Matos  7/16/2020    EMERGENCY DEPARTMENT       Conchis Maldonado, MARGARITA CNP  07/16/20 3891

## 2020-07-16 NOTE — TELEPHONE ENCOUNTER
Daughter calling reporting patient has fallen 6 times since last Wednesday 7/8/20. Stating they were calling to let the Emergency Room know she was coming.  Reporting increased lower leg weakness bilateral. Patient is unable to bear weight on right leg. Reporting right hip pain. Reporting patient is on Bactrim for UTI symptoms that have been improving. Reporting odor has resolved, ongoing frequency. Afebrile. Stating patient has dementia. Reporting increased irritability.     Per guidelines 911 advised.   Daughter agrees to call 911 now.     Caller verbalized understanding. Denies further questions.    Livier Agarwal RN  [facility]      Reason for Disposition    SEVERE weakness (i.e., unable to walk or barely able to walk, requires support) and new onset or worsening    Additional Information    Negative: Severe difficulty breathing (e.g., struggling for each breath, speaks in single words)    Negative: Shock suspected (e.g., cold/pale/clammy skin, too weak to stand, low BP, rapid pulse)    Negative: Difficult to awaken or acting confused (e.g., disoriented, slurred speech)    Negative: Fainted > 15 minutes ago and still feels too weak or dizzy to stand    Protocols used: WEAKNESS (GENERALIZED) AND FATIGUE-A-OH

## 2020-07-16 NOTE — ED NOTES
[facility]  ED Nurse Handoff Report    ED Chief complaint: Generalized Weakness      ED Diagnosis:   Final diagnoses:   Hyponatremia   Generalized muscle weakness   Falls frequently       Code Status: Hospital md to address     Allergies:   Allergies   Allergen Reactions     Codeine      Hives       Codeine Sulfate Hives     Penicillins Hives     Leonard Hives       Patient Story: Pt presents from home where she lives with  and daughters help to care for her. They state pt has been increasingly weak, was diagnosed with a UTI 4 days ago on a virtual visit and taking PO antibiotics. Family states pt also has had a few falls recently and was complaining of bilateral leg and L hip pain and having a hard time walking/bearing weight. Pt has baseline dementia. Blood work okay, except NA of 125, cath urine clean and imaging for head and hip normal. Staff attempted to road test pt and she was unable to stand. VSS. Pleasantly confused. Family updated.   Focused Assessment:  Baseline dementia, slightly confused, uses walker as needed     Treatments and/or interventions provided: Blood work, imaging  Patient's response to treatments and/or interventions: tolerating well     To be done/followed up on inpatient unit:  inpt orders    Does this patient have any cognitive concerns?: Baseline dementia    Activity level - Baseline/Home:  Stand with Assist  Activity Level - Current:   Total Care    Patient's Preferred language: English   Needed?: No    Isolation: None  Infection: Not Applicable  Bariatric?: No    Vital Signs:   Vitals:    07/16/20 1445 07/16/20 1500 07/16/20 1530 07/16/20 1545   BP:  135/62 129/63    Pulse:  58 62    Resp: 13  30 19   Temp:       TempSrc:       SpO2: 95% 97% 97%    Weight:           Cardiac Rhythm:     Was the PSS-3 completed:   Yes  What interventions are required if any?               Family Comments: Family updated on the phone  OBS brochure/video  discussed/provided to patient/family: N/A              Name of person given brochure if not patient: n/a              Relationship to patient: n/a    For the majority of the shift this patient's behavior was Green.   Behavioral interventions performed were none.    ED NURSE PHONE NUMBER: [phone]

## 2020-07-16 NOTE — ED NOTES
Pt getting slightly more confused, pt pulled out IV and blood all over. Pt was trying to slide out of bed. Pt changing and repositioned

## 2020-07-17 NOTE — H&P
Admitted:     07/16/2020      PRIMARY CARE PHYSICIAN:  Chelita Curtis MD      CHIEF COMPLAINT:  Generalized weakness, confusion, multiple falls.      HISTORY OF PRESENT ILLNESS:  Reanna Garza is a 89-year-old  female with history of moderate dementia, stroke, diabetes, prior history of TIA, who lives with her daughter, along with her , presents to [facility] with the above complaints.      The patient normally uses a walker.  About 4 days ago, the patient had increased incontinence and foul-smelling urine.  She was evaluated via telemedicine and was started on Bactrim.  For the last 4 days, the foul-smelling urine got better, as did her incontinence, but the patient became progressively weak and intermittently confused.  She had fallen about 3 times with multiple cause for the paramedics to help get the patient up.  The patient was brought in by her daughter Belle to [facility] for further assessment.      In the Emergency Department, the patient was seen by nurse practitioner, Conchis Maldonado.  The patient was afebrile.  Vital signs were stable.  Blood pressures were stable.  Normal oxygen saturation.  Blood work was abnormal.  Specifically, sodium was 125, potassium 4, BUN was 24, creatinine 1.02 with calculated a GFR of 48.  Calcium was 9.1.  LFTs were otherwise normal.  Glucose was decreased at 67.  White count 7.0, hemoglobin 11.1, platelets of 236.  She had a catheterized urine specimen, which had 2 white cells, 2 red cells.  Due to her fall, she had an x-ray of the pelvis and hip.  This showed advanced right hip degenerative changes with complete loss of joint space that is unchanged.  Left hip joint space is fairly well preserved with some early acetabular marginal osteophyte formation.  Once again, no fracture.  A head CT without contrast showed moderate volume loss, no acute intracranial abnormality.  The patient is being admitted for hyponatremia,  dehydration, multiple falls.      PAST MEDICAL HISTORY:   1.  AAA.   2.  Arthritis.   3.  stroke.   4.  Degenerative joint disease.   5.  Hyperlipidemia.   6.  Hypertension.   7.  Low back pain.   8.  Right hip pain.   9.  Type 2 diabetes.   10.  Restless leg syndrome.   11.  Vascular dementia.   12.  TIA.      PAST SURGICAL HISTORY:   1.  Blepharoplasty.   2.  Cholecystectomy.   3.  Hysterectomy.   4.  Cataract surgery.   5.  Shoulder arthroscopy.   6.  Shoulder replacement.      FAMILY HISTORY:  Brother with cancer, sister with Alzheimer's.      SOCIAL HISTORY:  No tobacco, rare alcohol.  .  She is full code.  Lives with her  and her daughter.      ALLERGIES:  CODEINE, SULFATE, PENICILLIN, STRAWBERRY.      CURRENT MEDICATIONS:   1.  Tylenol.   2.  Aspirin.   3.  Calcium with vitamin D.   4.  Glipizide.   5.  Hydrochlorothiazide.   6.  Ibuprofen.   7.  Losartan.   8.  Lovastatin.   9.  Melatonin.   10.  Multivitamin.   11.  Ocuvite.   12.  Bactrim.   13.  Vitamin D.      REVIEW OF SYSTEMS:  The patient on, but unable to give due to her dementia.      PHYSICAL EXAMINATION:   VITAL SIGNS:  Temperature 98.1, heart rate 60, respiration 20, blood pressure 155/69, saturations 96% on room air.   GENERAL:  The patient is a 89-year-old  female, medium-built, who is pleasant, cooperative, in no distress.   HEENT:  Head is atraumatic.  Pupils equal.  Mucous membranes are tacky.   NECK:  JVP is not elevated.   PULMONARY:  Lungs are clear to auscultation.   CARDIOVASCULAR:  S1, S2, borderline bradycardia.   ABDOMEN:  Soft, nontender and nondistended.  Normoactive bowel sounds.   MUSCULOSKELETAL:  No edema.   NEUROLOGIC:  She does move her lower extremity against gravity.  Upper extremity exam is unremarkable.  Cranial nerves appear to be grossly intact, but she is only oriented to herself.      LABORATORY AND IMAGING STUDIES:  As dictated in history of present illness.      ASSESSMENT:  Reanna Garza  is 89 and was recently treated for a urinary tract infection for the last 4 days, who has gotten progressively weak, multiple falls, noted to be hyponatremic, dehydrated based on BUN to creatinine ratio, and mild hypoglycemia, negative imaging studies, being admitted under observation status.      PLAN:   1.  Multiple falls with hyponatremia and dehydration.  The patient will be seen by PT.  Currently, she does not qualify for inpatient status, but this will be reassessed tomorrow.  Her urinalysis is unremarkable.  If the patient does not improve in the next 24-48 hours, then she could be switched over to inpatient.   2.  Hyponatremia.  This is likely due to decreased oral intake, based on concentrated urine as well as Bactrim effect.  The patient will be given normal saline overnight.  We will repeat a BMP in the morning.   3.  Dehydration.  The patient has a concentrated urine and elevated BUN to creatinine ratio.  The patient will be given IV fluids.   4.  Mild hypoglycemia.  The patient's initial glucose was 67.  Subsequent blood glucoses are in the 70s-80s.   5.  Diabetes.  We are going to hold the patient's oral hypoglycemics.  We will put her on regular diet given low blood glucose.  I suspect this is likely due to her being on glipizide and prolonged effect of glipizide in a relative fasting state.   6.  Hypertension.  We will continue the patient on her Cozaar.   7.  Hypertension.  We will continue the patient on Mevacor.   8.  Dementia.  The patient will be monitored for delirium.   9.  Deep venous thrombosis prophylaxis.  The patient will have compression boots.      CODE STATUS:  FULL.      Observation status.         SILAS GODDARD MD             D: 2020   T: 2020   MT: ILEANA      Name:     IRAIS CLOUD   MRN:      [id number]        Account:      [id number]   :      1927        Admitted:     2020                   Document: [id number]       cc: Chelita Curtis MD

## 2020-07-17 NOTE — PROGRESS NOTES
[facility]    Hospitalist Progress Note    Assessment & Plan   Reanna Garza is a 89 year old female with PMHx of, hypertension, hyperlipidemia, hx of CVA/TIA, DM II, RKS, chronic low back pain and moderate dementia (lives with her daughter) who was admitted on 7/16/2020 under observation for evaluation of weakness and falls, with labs notable for hyponatremia and dehydration.    Generalized Weakness, Multiple Falls  Dehydration: Improved  Moderate Dementia  Weakness/falls likey multifactorial -- dt hyponatremia, mild hypoglycemia, dehydration (elevated BUN/Cr noted on initial BMP). Lives at home with daughter. Has had several falls in recent days, needs help of EMS to get up. Head CT neg. Xray L hip neg for fracture.   -- IVFs started on admission  -- PT consulted, anticipate she will need placement at discharge  -- mgmt of specific issues below    Hyponatremia  Na 125 on presentation. Likely dt decreased oral intake and use of hydrochlorothiazide and Bactrim. Urine studies difficult to interpret given diuretic use. NS @100ml/h started on admission   -- repeat BMP this morning pending    1045 Addendum:  Na 127 on BMP this morning -- cont NS @100ml/h and repeat BMP in AM    DM II  Mild hypoglycemia on admission: Improved  A1C was 5.3 in 5/2019.  PTA meds: glipizide 2.5mg BID  BG 67 on presentation. Improved.   -- oral meds held on admission  -- trend BG for now -- okay to run high given age and other comorbid conditions, would recommend to hold glipizide at discharge    Recently diagnosed UTI: Resolved  Had virtual visit with PCP on 7/14 and was diagnosed with UTI (sx included foul smelling urine and increased incontinence). Placed on empiric 7d course of Bactrim (no UA/UC obtained). Urinary sx improved but weakness worsened. UA on admission unremarkable -- not suggestive of UTI. WBC nl. Afebrile  -- no need to continue antibiotics at this time    Hypertension  Hyperlipidemia  Hx of  CVA/TIA  PTA meds: aspirin 81mg BID, lovastatin 40mg HS, hydrochlorothiazide 12.5mg daily, losartan 50mg HS  -- losartan continued on admission, other meds held while under obs status    FEN: cont NS @100ml/h, repeat lytes pending with AM labs, regular diet  DVT Prophylaxis: PCDs  Code Status: Full Code    Disposition: Await repeat Na check and PT assessment. Likely to need placement as she is needing use of lift this morning. Anticipate discharge tomorrow of Na improved.SW consulted to help with discharge planning -- likely has bed at [facility] for tomorrow.     1400 Addendum  Daughter's Belle and Vanessa at bedside this afternoon and updated on care plan.    Kaley Austin Liu    Interval History   Uneventful night. Seen this morning. Pleasantly confused. Doesn't know why she's here. No specific complaints at this point    -Data reviewed today: I reviewed all new labs and imaging results over the last 24 hours. I personally reviewed no images or EKG's today.    Physical Exam   Temp: 97.8  F (36.6  C) Temp src: Oral BP: 111/56 Pulse: 50 Heart Rate: 54 Resp: 16 SpO2: 99 % O2 Device: None (Room air)    Vitals:    07/16/20 1404   Weight: 65.3 kg (144 lb)     Vital Signs with Ranges  Temp:  [96.4  F (35.8  C)-98.1  F (36.7  C)] 97.8  F (36.6  C)  Pulse:  [50-62] 50  Heart Rate:  [54-62] 54  Resp:  [13-30] 16  BP: (111-155)/(46-87) 111/56  SpO2:  [95 %-99 %] 99 %  I/O last 3 completed shifts:  In: 1525 [I.V.:1525]  Out: -     Constitutional: Resting comfortably, pleasantly confused -- oriented to self only, NAD  Respiratory: CTAB, no wheeze/rales/rhonchi, no increased work of breathing  Cardiovascular: HRRR, no MGR, no LE edema  GI: S, NT, ND, +BS  Skin/Integumen: warm/dry  Other:      Medications     sodium chloride 100 mL/hr at 07/17/20 0740       losartan  50 mg Oral At Bedtime     multivitamin  with lutein  1 capsule Oral Daily       Data   Recent Labs   Lab 07/17/20  0907 07/16/20  1413   WBC 5.7 7.1    HGB 10.8* 11.1*   MCV 84 83    236   * 125*   POTASSIUM 3.9 4.0   CHLORIDE 97 92*   CO2 22 23   BUN 18 24   CR 0.90 1.02   ANIONGAP 8 10   YURI 8.5 9.1   GLC 82 67*   ALBUMIN  --  3.5   PROTTOTAL  --  6.9   BILITOTAL  --  0.5   ALKPHOS  --  55   ALT  --  35   AST  --  40       Recent Results (from the past 24 hour(s))   Head CT w/o contrast    Narrative    CT SCAN OF THE HEAD WITHOUT CONTRAST   7/16/2020 3:18 PM     HISTORY: Fall, confusion.    TECHNIQUE: Axial images of the head and coronal reformations without  IV contrast material. Radiation dose for this scan was reduced using  automated exposure control, adjustment of the mA and/or kV according  to patient size, or iterative reconstruction technique.    COMPARISON: Head MRI 7/12/2018, head CT 5/26/2015    FINDINGS: Moderate volume loss is present. White matter  hypoattenuation likely represents moderate chronic small vessel  ischemic change. The cerebral hemispheres, brainstem, and cerebellum  otherwise demonstrate normal morphology and attenuation. No evidence  of acute ischemia, hemorrhage, mass, mass effect or hydrocephalus. The  visualized calvarium, tympanic cavities, mastoid cavities, and  paranasal sinuses are unremarkable.      Impression    IMPRESSION: No acute intracranial abnormality.    DARELL HUYNH MD   XR Pelvis w Hip Left 1 View    Narrative    XR PELVIS AND LEFT HIP ONE VIEW   7/16/2020 3:56 PM     HISTORY: Fall, left hip pain.    COMPARISON: 3/24/2020 x-ray.      Impression    IMPRESSION: Advanced right hip degenerative changes with complete loss  of joint space. This is unchanged.    Left hip joint space is fairly well-preserved with some early  acetabular marginal osteophyte formation. No evidence of acute  fracture.    CORINNE CARRERO MD

## 2020-07-17 NOTE — PROGRESS NOTES
Daughter (Belle) has requested mychart services for this patient be disabled because of a concern that someone has access to the patient's mychart without the patient's permission. A call out to medical records and to the [employer] support line number at [phone] was made with no response and no changes at this time. Belle requested to be notified if/when this disabling could be completed.

## 2020-07-17 NOTE — PROGRESS NOTES
DATE & TIME: 7/16/2020 night shift   Cognitive Concerns/ Orientation : Oriented to self only- can fight cares at times, easily redirectable.   BEHAVIOR & AGGRESSION TOOL COLOR: green  CIWA SCORE: n/a   ABNL VS/O2: VSS on RA ex slightly bradycardic at times   MOBILITY: unable to stand on evenings, remain in bed during night shift. Heavy Ax2 with gb and walker.   PAIN MANAGMENT: denied pain on shift   DIET: regular diet-  BOWEL/BLADDER: Incontinent- no BM on shift. No urine overnight, per pt holds bladder until up in morning. Bladder scanned for 408. Purewick removed, as pt had peed prior to purewick being placed x4- continue to monitor.   ABNL LAB/BG: , cloride 92 BG 79   DRAIN/DEVICES: PIV x2 in right arm running NS at 100ml/hr  TELEMETRY RHYTHM: n/a  SKIN: WDL ex some bruising and blanchable redness on coccyx.  TESTS/PROCEDURES: none scheduled  D/C DAY/GOALS/PLACE: pending strength improvement and plan.  OTHER IMPORTANT INFO: be aware of family communication per note; Belle is primary care giver.

## 2020-07-17 NOTE — PLAN OF CARE
Summary:   ED admit at 6:15pm for weakness x3 days.  DATE & TIME: 7/16/2020 3-11pm shift   Cognitive Concerns/ Orientation : oriented to self, occasionally calls out for family members not present.    BEHAVIOR & AGGRESSION TOOL COLOR: green  CIWA SCORE: n/a   ABNL VS/O2: VSS on RA  MOBILITY: unable to stand- attempted to get to commode and pivot unsuccessful.   PAIN MANAGMENT: endorses some left leg/ thigh pain  DIET: regular diet- family concern to monitor bG. Not ordered.- check before dinner is 89.  BOWEL/BLADDER: incontinent of bladderx4 this shift and straight cathed for urine specimens.  ABNL LAB/BG: , cloride 92  DRAIN/DEVICES: PIV x2 in right arm running NS at 100ml/hr  TELEMETRY RHYTHM: n/a  SKIN: WDL ex some bruising and blanchable redness on coccyx.  TESTS/PROCEDURES: none scheduled  D/C DAY/GOALS/PLACE: pending strength improvement and plan.  OTHER IMPORTANT INFO: be aware of family communication per note; Belle is primary care giver.

## 2020-07-17 NOTE — PROGRESS NOTES
"Long discussion with patients daughter Belle regarding observation status and plan of care. Obs education provided and daughter verbalizes understanding. Belle stating that she lives with her parents and is the primary caregiver for both of them. Her father is 96 y/o and has some forgetfulness and her Mother is 93 y/o. Mother has dementia. Belle provides assistance with mobility  and medications. Belle has 2 other siblings who are able to provide help if needed. Belle stating the last few days it has been difficult to assist patient as she is \"dead weight\". Belle would prefer patient going to a TCU for few days to gain mobility and strength.   Belle stating she would like to be her parents PCA and requested information on how to obtain Medicaid. Writer gave information on Senior Linkage Line that would be able to help families.  "

## 2020-07-17 NOTE — PLAN OF CARE
DATE & TIME: 7/16/2020 4081-8269  Cognitive Concerns/ Orientation : Oriented to self only. Pleasant, redirectable.  BEHAVIOR & AGGRESSION TOOL COLOR: green  CIWA SCORE: n/a   ABNL VS/O2: VSS on RA.  MOBILITY: Up with 2 assist GB+ walker to BCM.  PAIN MANAGMENT: Denies  DIET: regular diet- tolerating well.   BOWEL/BLADDER: frequency with urination, inc (dribbling). 3 BM's this shift, soft.  ABNL LAB/BG:  from 125, recheck in AM.  DRAIN/DEVICES: PIV x2 in right arm infusing NS at 100ml/hr  TELEMETRY RHYTHM: n/a  SKIN: WDL ex some bruising on right hip, split area under mid panus.  TESTS/PROCEDURES: none scheduled  D/C DAY/GOALS/PLACE: Will discharge to TCU pending Na+ improvement and placement.   OTHER IMPORTANT INFO: Daughter Belle updated at bedside. SW following for TCU placement, referrals sent. Nursing will continue to monitor.

## 2020-07-17 NOTE — PLAN OF CARE
[facility]      OUTPATIENT PHYSICAL THERAPY EVALUATION  PLAN OF TREATMENT FOR OUTPATIENT REHABILITATION  (COMPLETE FOR INITIAL CLAIMS ONLY)  Patient's Last Name, First Name, M.I.  YOB: 1927  Reanna Garza                        Provider's Name  [facility] Medical Record No.  [id number]                               Onset Date:  07/16/20   Start of Care Date:  07/17/20      Type:     _X_PT   ___OT   ___SLP Medical Diagnosis:  generalized weakness                        PT Diagnosis:  Decreased I with functional mobility, fall risk   Visits from SOC:  1   _________________________________________________________________________________  Plan of Treatment/Functional Goals    Planned Interventions:  ,    bed mobility training, gait training, ROM, strengthening, transfer training, progressive activity/exercise,       Goals: See Physical Therapy Goals on Care Plan in [facility] electronic health record.    Therapy Frequency:    Predicted Duration of Therapy Intervention:    _________________________________________________________________________________    I CERTIFY THE NEED FOR THESE SERVICES FURNISHED UNDER        THIS PLAN OF TREATMENT AND WHILE UNDER MY CARE     (Physician co-signature of this document indicates review and certification of the therapy plan).              Certification date from: 07/17/20, Certification date to: 07/17/20    Referring Physician: Brenton Muhammad MD             Initial Assessment        See Physical Therapy evaluation dated 07/17/20 in Epic electronic health record.

## 2020-07-17 NOTE — PLAN OF CARE
Discharge Planner PT   Patient plan for discharge: Would like to go home and do things on her own   Current status: Pt is a 89 year old female who presents with generalized weakness, confusion and falls. Prior to admit pt required supervision for functional mobility and some assist for ADLs. Pt lives in a house with 4 QASIM, on railing; with her dtr and spouse. Family able to provide minimal assistance, but pt may be alone at times.     Barriers to return to prior living situation: Pt is at high risk for falls, QASIM, family's ability to provide adequate assistance.   Recommendations for discharge: TCU  Rationale for recommendations: Pt is below baseline functioning; would benefit from skilled PT intervention to improve durability and decrease fall risk prior to returning home.        Entered by: Karla Benitez 07/17/2020 10:50 AM

## 2020-07-17 NOTE — CONSULTS
Care Transition Initial Assessment - SW     Met with: DaughterBelle    Active Problems:    Hyponatremia       DATA  Lives With: child(mann), adult, spouse   Living Arrangements: house  Quality of Family Relationships: involved, supportive  Description of Support System: Involved, Supportive  Who is your support system?: Children  Support Assessment: Adequate family and caregiver support.   Identified issues/concerns regarding health management: Anticipate TCU as pt is up with assist of two, per nursing. Pt is OBS status but has [facility] advantage. SW spoke with dtr Belle. Discussed/explained TCU. Pt was at [facility] several years ago. Belle would like pt in the [facility] area. Will send referrals to [facility] and [facility]. Belle in agreement. Discussed Covid status in facilities. Suggested using Medicare.gov for SNF ratings.       Quality of Family Relationships: involved, supportive     ASSESSMENT  Cognitive Status: Oriented to self only, per nursing. Family appears involved and supportive.   Concerns to be addressed: On-going discharge planning.     PLAN  Financial costs for the patient includes: None.  Patient given options and choices for discharge: Yes.  Patient/family is agreeable to the plan? YES  Patient Goals and Preferences: TCU.  Patient anticipates discharging to: TCU.    JAVIER Flores, LGSW  [phone]  [facility]    Addendum: Pt accepted at [facility] TCU for Saturday. Dtr Belle updated and in agreement. Discussed current Covid status and isolation policy at [facility] and no visitor policy. [employer] prior-auth received, auth#[id number].

## 2020-07-18 NOTE — PLAN OF CARE
DATE/TIME: 7/18/2020 3597-1097  Cognitive Concerns/ Orientation : Oriented to self only, very pleasant, easily redirectable. Quite forgetful.   BEHAVIOR & AGGRESSION TOOL COLOR: green  CIWA SCORE: n/a   ABNL VS/O2: VSS on RA ex bradycardic at times.   MOBILITY: Up with 1 assist GB+ walker to [facility].   PAIN MANAGMENT: Denies  DIET: regular diet, good intake encouraging PO fluids.   BOWEL/BLADDER: incontinent of bladder, no BM this shift.   ABNL LAB/BG:  from 127, recheck in AM.  TELEMETRY RHYTHM: n/a  SKIN: WDL ex some bruising on right hip, split area under mid panus.  TESTS/PROCEDURES: none scheduled  D/C DAY/GOALS/PLACE: Accepted into [facility], anticipate discharge tomorrow 7/19 pending NA check.   OTHER IMPORTANT INFO: Bed alarm on for safety. Rounded on freq. Reoriented as needed. Daughter Belle updated at bedside. SW following. Nursing will continue to monitor.      Observation goals  PRIOR TO DISCHARGE      Comments:   1. Correct hyponatremia: NOT MET: IVF discontinued, recheck in AM.   2. Home safety: MET: pt will discharge to [facility] TCU when medically stable.

## 2020-07-18 NOTE — PROGRESS NOTES
Cognitive Concerns/ Orientation : Oriented to self only. Very pleasant on shift- easily redirectable. Forgetful   BEHAVIOR & AGGRESSION TOOL COLOR: green  CIWA SCORE: n/a   ABNL VS/O2: VSS on RA ex slightly bradycardic at times   MOBILITY: Up with 2 assist GB+ walker to BCM. Lift also utilized at times  PAIN MANAGMENT: Denies  DIET: regular diet- tolerating well.   BOWEL/BLADDER: Incontinent of urine on shift, no BM.   ABNL LAB/BG: - recheck in AM  TELEMETRY RHYTHM: n/a  SKIN: WDL ex some bruising on right hip, split area under mid panus.  TESTS/PROCEDURES: none scheduled  D/C DAY/GOALS/PLACE: Accepted into [facility]- possibly discharge today 7/18  OTHER IMPORTANT INFO: Bed alarm on for safety. Rounded on freq. Reoriented as needed.

## 2020-07-18 NOTE — PROGRESS NOTES
SW:  D:  Dr Warner does not plan to discharge patient today.  [facility] updated. They will have a vacancy available tomorrow if patient is discharged.

## 2020-07-18 NOTE — PROGRESS NOTES
Observation goals  PRIOR TO DISCHARGE      Comments:   1. Correct hyponatremia: NOT MET: IVF discontinued, recheck in AM.   2. Home safety: MET: pt will discharge to [facility] TCU when medically stable.

## 2020-07-18 NOTE — PROGRESS NOTES
[facility]    Hospitalist Progress Note    Assessment & Plan   Reanna Garza is a 89 year old female with PMHx of, hypertension, hyperlipidemia, hx of CVA/TIA, DM II, RKS, chronic low back pain and moderate dementia (lives with her daughter) who was admitted on 7/16/2020 under observation for evaluation of weakness and falls, with labs notable for hyponatremia and dehydration.    Generalized Weakness, Multiple Falls  Dehydration: Improved  Moderate Dementia  Weakness/falls likey multifactorial -- dt hyponatremia, mild hypoglycemia, dehydration (elevated BUN/Cr noted on initial BMP). Lives at home with daughter. Has had several falls in recent days, needs help of EMS to get up. Head CT neg. Xray L hip neg for fracture.   -- IVFs started on admission  -- PT consulted, anticipate she will need placement at discharge  -- mgmt of specific issues below    Hyponatremia  Na 125 on presentation. Likely dt decreased oral intake and use of hydrochlorothiazide and Bactrim. Urine studies difficult to interpret given diuretic use. NS @100ml/h started on admission   -- Na improved 130 on 7/18  -- discontinue IV fluids, ensure can maintain PO prior to discharge  -- repeat labs in the am    DM II  Mild hypoglycemia on admission: Improved  A1C was 5.3 in 5/2019.  PTA meds: glipizide 2.5mg BID  BG 67 on presentation. Improved.   -- oral meds held on admission  -- trend BG for now, low normal  -- discontinue glipizide at the time of discharge    Recently diagnosed UTI: Resolved  Had virtual visit with PCP on 7/14 and was diagnosed with UTI (sx included foul smelling urine and increased incontinence). Placed on empiric 7d course of Bactrim (no UA/UC obtained). Urinary sx improved but weakness worsened. UA on admission unremarkable -- not suggestive of UTI. WBC nl. Afebrile  -- no need to continue antibiotics at this time    Hypertension  Hyperlipidemia  Hx of CVA/TIA  PTA meds: aspirin 81mg BID, lovastatin 40mg HS,  hydrochlorothiazide 12.5mg daily, losartan 50mg HS  -- losartan continued on admission, other meds held while under obs status    FEN: regular diet  DVT Prophylaxis: PCDs  Code Status: Full Code    Disposition: Na improved, if able to maintain overnight, likely discharge 7/19 to [facility]    Keanu Warner M.D.  Hospitalist  Pager [phone]  Text Page      Interval History   Uneventful night. Doing ok. Denies pain, sob. Pleasantly confused.     -Data reviewed today: I reviewed all new labs and imaging results over the last 24 hours. I personally reviewed no images or EKG's today.    Physical Exam   Temp: 98.1  F (36.7  C) Temp src: Oral BP: 124/60 Pulse: 55 Heart Rate: 62 Resp: 16 SpO2: 96 % O2 Device: None (Room air)    Vitals:    07/16/20 1404   Weight: 65.3 kg (144 lb)     Vital Signs with Ranges  Temp:  [97.9  F (36.6  C)-98.2  F (36.8  C)] 98.1  F (36.7  C)  Pulse:  [55-68] 55  Heart Rate:  [62-64] 62  Resp:  [16-20] 16  BP: (108-129)/(50-61) 124/60  SpO2:  [95 %-97 %] 96 %  No intake/output data recorded.    Constitutional: Resting comfortably, pleasantly confused -- oriented to self only, NAD  Respiratory: CTAB  Cardiovascular: HRRR, no MGR, no LE edema  GI: S, NT, ND, +BS  Skin/Integumen: warm/dry  Other:      Medications       losartan  50 mg Oral At Bedtime     multivitamin  with lutein  1 capsule Oral Daily       Data   Recent Labs   Lab 07/18/20  0638 07/17/20  0907 07/16/20  1413   WBC  --  5.7 7.1   HGB  --  10.8* 11.1*   MCV  --  84 83   PLT  --  209 236   * 127* 125*   POTASSIUM 3.7 3.9 4.0   CHLORIDE 100 97 92*   CO2 25 22 23   BUN 13 18 24   CR 0.78 0.90 1.02   ANIONGAP 5 8 10   YURI 8.4* 8.5 9.1   GLC 84 82 67*   ALBUMIN  --   --  3.5   PROTTOTAL  --   --  6.9   BILITOTAL  --   --  0.5   ALKPHOS  --   --  55   ALT  --   --  35   AST  --   --  40       No results found for this or any previous visit (from the past 24 hour(s)).

## 2020-07-18 NOTE — PLAN OF CARE
Cognitive Concerns/ Orientation : Oriented to self only. Restless this evening and needing freq reorienting. Pt confused with situation and unable to keep on topic at times.   BEHAVIOR & AGGRESSION TOOL COLOR: green  CIWA SCORE: n/a   ABNL VS/O2: VSS on RA ex slightly bradycardic at times   MOBILITY: Up with 2 assist GB+ walker to [facility]. Lift utilized after pt had c/o weakness and difficulty following commands.   PAIN MANAGMENT: Denies  DIET: regular diet- tolerating well.   BOWEL/BLADDER: Freq large incontinent voids. No BM this shift.   ABNL LAB/BG:  from 125. DRAIN/DEVICES: PIV x3 removed by pt this evening. IV fluids discontinued.   TELEMETRY RHYTHM: n/a  SKIN: WDL ex some bruising on right hip, split area under mid panus.  TESTS/PROCEDURES: none scheduled  D/C DAY/GOALS/PLACE: Will discharge to TCU pending Na+ improvement and placement.   OTHER IMPORTANT INFO: Bed alarm on for safety. Rounded on freq. Reoriented as needed.

## 2020-07-19 NOTE — PROGRESS NOTES
SW:  D:  Patient has discharge orders for today.  [facility] can accept patient today and requests admission at 1400.    Writer spoke with daughter Belle regarding the discharge and reviewed the cost for transportation.  Daughter said she had been told her mother would be transported by ambulance. Writer explained her mother doesn't require ambulance and is able to travel in a medivan. She stated she cannot transport her mother because she will think she is going home.  Informed daughter of the medivan transport and explained the bill will be mailed to her mothers mailing address. Daughter not pleased with transport cost but did approve.  [employer] will be here at 1330.  Orders have been sent electronically to [facility].  PA approved.  Effective date: 7/19/20  PA reference #: [id number]  Pt. notified:  daughter

## 2020-07-19 NOTE — PLAN OF CARE
DATE/TIME: 7/19/2020 3964-1756  Cognitive Concerns/ Orientation : Oriented to self only, calm and cooperative.   BEHAVIOR & AGGRESSION TOOL COLOR: green  CIWA SCORE: n/a   ABNL VS/O2: VSS on RA ex bradycardic at times.   MOBILITY: Not OOB this shift, T&R q2h.   PAIN MANAGMENT: Denies  DIET: Regular diet, with set up.  BOWEL/BLADDER: Incontinent of urine.  No BM this shift.   ABNL LAB/BG:  from 127, recheck in AM.  TELEMETRY RHYTHM: n/a  SKIN: WDL ex some bruising on right hip, split area under mid panus.  TESTS/PROCEDURES: None scheduled  D/C DAY/GOALS/PLACE: Accepted into [facility], anticipate discharge tomorrow 7/19 pending NA check.   OTHER IMPORTANT INFO: Bed alarm on for safety.

## 2020-07-19 NOTE — UTILIZATION REVIEW
Concurrent stay review; Secondary Review Determination     Under the authority of the Utilization Management Committee, the utilization review process indicated a secondary review on the above patient.  The review outcome is based on review of the medical records, discussions with staff, and applying clinical experience noted on the date of the review.          (x) Observation Status Appropriate - Concurrent stay review    RATIONALE FOR DETERMINATION   89 yo female with weakness, multiple falls, progressive dementia, hyponatremia. Placement anticipated today.     Patient is clinically improving and there is no clear indication to change patient's status to inpatient. The severity of illness, intensity of service provided, expected LOS and risk for adverse outcome make the care appropriate for observation.    This document was produced using voice recognition software     The information on this document is developed by the utilization review team in order for the business office to ensure compliance.  This only denotes the appropriateness of proper admission status and does not reflect the quality of care rendered.         The definitions of Inpatient Status and Observation Status used in making the determination above are those provided in the CMS Coverage Manual, Chapter 1 and Chapter 6, section 70.4.      Sincerely,   Mary Hatfield MD  Utilization Review  Physician Advisor  [facility].

## 2020-07-19 NOTE — PROGRESS NOTES
Observation goals  PRIOR TO DISCHARGE      Comments:   1. Correct hyponatremia: MET  2. Home safety: MET: pt will discharge to [facility] @ 1770 today 7/19.

## 2020-07-19 NOTE — PLAN OF CARE
Discharge    Patient discharged to [facility] via wheelchair with [facility].  Care plan note: Oriented to self only, pleasantly confused easily redirectable. VSS, on RA, denies pain. Up with 1 assist GB + walker. Na+ 131, tolerating regular diet, good PO intake with encouragement. PIV removed. Discharge plan discussed with pt, she is upset she cannot go home, explained TCU is for a short time, pt more acceptable. Pt daughter Belle updated via phone.     Listed belongings gathered and returned to patient. Yes  Care Plan and Patient education resolved: Yes  Prescriptions if needed, hard copies sent with patient  NA  Home and hospital acquired medications returned to patient: NA  Medication Bin checked and emptied on discharge Yes  Follow up appointment made for patient: No, TCU will schedule follow-up appointments.

## 2020-07-19 NOTE — PLAN OF CARE
"Cognitive Concerns/ Orientation : Oriented to self only, calm and cooperative. Quite forgetful. Pt making freq requests this evening to \"go home\" and confused to current state of health and plan of care.   BEHAVIOR & AGGRESSION TOOL COLOR: green  CIWA SCORE: n/a   ABNL VS/O2: VSS on RA ex bradycardic at times.   MOBILITY: Up with 1 assist GB+ walker to [facility]. Up in chair most of evening. Chair alarm on for safety. Lift used this evening after pt had difficulty following instructions and feeling \"weak\".   PAIN MANAGMENT: Denies  DIET: Regular diet, good intake encouraging PO fluids. IV SL   BOWEL/BLADDER: Incontinent of bladder at times, scheduled toileting. No BM this shift.   ABNL LAB/BG:  from 127, recheck in AM.  TELEMETRY RHYTHM: n/a  SKIN: WDL ex some bruising on right hip, split area under mid panus.  TESTS/PROCEDURES: None scheduled  D/C DAY/GOALS/PLACE: Accepted into [facility], anticipate discharge tomorrow 7/19 pending NA check.   OTHER IMPORTANT INFO: Bed alarm on for safety. Rounded on freq. Reoriented as needed. SW following. Reoriented as needed.   "

## 2020-07-19 NOTE — DISCHARGE SUMMARY
[facility]    Discharge Summary  Hospitalist    Date of Admission:  7/16/2020  Date of Discharge:  7/19/2020  Discharging Provider: Keanu Wraner MD  Date of Service (when I saw the patient): 07/19/20    Discharge Diagnoses   Generalized Weakness, Multiple Falls  Dehydration: Improved  Moderate Dementia  Hyponatremia  DM II  Hypoglycemia  Recently diagnosed UTI: Resolved  Hypertension  Hyperlipidemia  Hx of CVA/TIA    History of Present Illness   Reanna Garza is a 89 year old female with PMHx of hypertension, hyperlipidemia, hx of CVA/TIA, DM II, chronic low back pain and moderate/severe dementia (lives with her daughter) who was admitted on 7/16/2020 under observation for evaluation of weakness and falls, with labs notable for hyponatremia and dehydration.    Hospital Course   Reanna Graza was admitted on 7/16/2020.  The following problems were addressed during her hospitalization:     Generalized Weakness, Multiple Falls  Dehydration: Improved  Moderate Dementia  Weakness/falls likey multifactorial -- dt hyponatremia, mild hypoglycemia, dehydration (elevated BUN/Cr noted on initial BMP). Lives at home with daughter. Has had several falls in recent days, needs help of EMS to get up. Head CT neg. Xray L hip neg for fracture.   -- IV fluids given during admission, discontinued 24 hours prior to discharge  -- PT consulted, recommended TCU at discharge  -- mgmt of specific issues below     Hyponatremia  Na 125 on presentation. Likely dt decreased oral intake and use of hydrochlorothiazide and Bactrim. Urine studies difficult to interpret given diuretic use. NS @100ml/h started on admission   -- Na improved 131 on 7/19  -- stop hydrochlorothiazide at discharge  -- repeat BMP after discharge in 2-3 days     DM II  Mild hypoglycemia on admission: Improved  A1C was 5.3 in 5/2019.  PTA meds: glipizide 2.5mg BID  BG 67 on presentation. Improved.   -- oral meds held on admission  -- BS  generally low during hospital stay  -- discontinue glipizide at the time of discharge     Recently diagnosed UTI: Resolved  Had virtual visit with PCP on 7/14 and was diagnosed with UTI (sx included foul smelling urine and increased incontinence). Placed on empiric 7d course of Bactrim (no UA/UC obtained). Urinary sx improved but weakness worsened. UA on admission unremarkable -- not suggestive of UTI. WBC nl. Afebrile  -- abx completed     Hypertension  Hyperlipidemia  Hx of CVA/TIA  PTA meds: aspirin 81mg BID, lovastatin 40mg HS, hydrochlorothiazide 12.5mg daily, losartan 50mg HS  -- continue losartan at discharge  -- stop hydrochlorothiazide with hyponatremia  -- continue aspirin, lovastatin at discharge  -- further adjustment to BP regimen as outpatient as needed     Keanu Warner M.D.  Hospitalist  Pager [phone]    Significant Results and Procedures   Hip Xray  CT head without contrast    Pending Results   None    Code Status   Full Code       Primary Care Physician   Chelita Curtis    Physical Exam   Temp: 98.3  F (36.8  C) Temp src: Oral BP: 112/47 Pulse: 66 Heart Rate: 52 Resp: 16 SpO2: 96 % O2 Device: None (Room air)    Vitals:    07/16/20 1404   Weight: 65.3 kg (144 lb)     Vital Signs with Ranges  Temp:  [97.8  F (36.6  C)-98.3  F (36.8  C)] 98.3  F (36.8  C)  Pulse:  [66] 66  Heart Rate:  [52-63] 52  Resp:  [16] 16  BP: (112-155)/(47-65) 112/47  SpO2:  [94 %-97 %] 96 %  I/O last 3 completed shifts:  In: 1270 [P.O.:200; I.V.:1070]  Out: -     Constitutional: Alert, pleasant, not oriented, no distress  Respiratory: Lungs clear to auscultation bilaterally, no wheezes, no crackles  Cardiovascular: Regular rate and rhythm, no murmurs  GI: Soft, non-tender, non-disteneded, good bowel sounds  Skin/Integumen: No erythema, cyanosis or edema  Other:      Discharge Disposition   Discharged to short-term care facility  Condition at discharge: Stable    Consultations This Hospital Stay   PHYSICAL  THERAPY ADULT IP CONSULT  SOCIAL WORK IP CONSULT  PHYSICAL THERAPY ADULT IP CONSULT  OCCUPATIONAL THERAPY ADULT IP CONSULT    Time Spent on this Encounter   I, Keanu Warner MD, personally saw the patient today and spent less than or equal to 30 minutes discharging this patient.    Discharge Orders      General info for SNF    Length of Stay Estimate: Short Term Care: Estimated # of Days <30  Condition at Discharge: Improving  Level of care:skilled   Rehabilitation Potential: Good  Admission H&P remains valid and up-to-date: Yes  Recent Chemotherapy: N/A  Use Nursing Home Standing Orders: Yes     Mantoux instructions    Give two-step Mantoux (PPD) Per Facility Policy Yes     Reason for your hospital stay    You were hospitalized secondary to weakness, which was likely caused by infection, low blood sugar and dehydration     Glucose monitor nursing POCT    Before meals and at bedtime     Follow Up and recommended labs and tests    Follow up with alf physician.  The following labs/tests are recommended: BMP.     Additional Discharge Instructions    Stop hydrochlorothiazide and glipizide     Activity - Up with nursing assistance     Encourage PO fluids     Full Code     Physical Therapy Adult Consult    Evaluate and treat as clinically indicated.    Reason:  Weakness and deconditioning s/p hospitalization     Occupational Therapy Adult Consult    Evaluate and treat as clinically indicated.    Reason:  Weakness and deconditioning s/p hospitalization     Fall precautions     Advance Diet as Tolerated    Follow this diet upon discharge: Orders Placed This Encounter      Regular Diet Adult     Discharge Medications   Current Discharge Medication List      CONTINUE these medications which have NOT CHANGED    Details   aspirin 81 MG EC tablet Take 81 mg by mouth 2 times daily      calcium citrate-vitamin D (CITRACAL) 315-200 MG-UNIT TABS per tablet Take 1 tablet by mouth daily      Ibuprofen (ADVIL PO) Take 200  mg by mouth every 6 hours as needed       losartan (COZAAR) 50 MG tablet Take 50 mg by mouth At Bedtime      lovastatin (MEVACOR) 40 MG tablet TAKE 1 TABLET BY MOUTH EVERYDAY AT BEDTIME  Qty: 90 tablet, Refills: 3    Associated Diagnoses: Hyperlipidemia LDL goal <100      melatonin 3 MG tablet Take 6 mg by mouth At Bedtime      !! multivitamin (CENTRUM SILVER) tablet Take 1 tablet by mouth daily      !! multivitamin (OCUVITE) TABS tablet Take 1 tablet by mouth daily      vitamin D3 (CHOLECALCIFEROL) 50 mcg (2000 units) tablet Take 1 tablet by mouth 2 times daily      acetaminophen 650 MG TABS Take 650 mg by mouth every 4 hours as needed for mild pain or fever  Qty: 100 tablet    Associated Diagnoses: Stroke (H)      !! blood glucose (ACCU-CHEK PATTI PLUS) test strip USE AS DIRECTED TO TEST BLOOD SUGAR once daily or as directed.  Qty: 90 strip, Refills: 1    Associated Diagnoses: Type 2 diabetes mellitus without complication, without long-term current use of insulin (H)      blood glucose monitoring (ACCU-CHEK PATTI PLUS) meter device kit Use to test blood sugars 4 times daily or as directed.  Qty: 1 kit, Refills: 0    Associated Diagnoses: Type 2 diabetes mellitus without complication (H)      blood glucose monitoring (ACCU-CHEK MULTICLIX) lancets Use to test blood sugar 4 times daily.  Qty: 400 each, Refills: 3    Associated Diagnoses: Type 2 diabetes mellitus without complication, without long-term current use of insulin (H)      Blood Glucose Monitoring Suppl (ACCU-CHEK PATTI) DEANNA 1 Device daily    Associated Diagnoses: Type 2 diabetes, HbA1C goal < 8% (H)      !! blood glucose test strip Use to test blood sugar 2 times daily or as directed.  Qty: 1 Box, Refills: prn    Associated Diagnoses: Type 2 diabetes, HbA1C goal < 8% (H)       !! - Potential duplicate medications found. Please discuss with provider.      STOP taking these medications       glipiZIDE (GLUCOTROL) 5 MG tablet Comments:   Reason for Stopping:          hydrochlorothiazide (MICROZIDE) 12.5 MG capsule Comments:   Reason for Stopping:         sulfamethoxazole-trimethoprim (BACTRIM DS) 800-160 MG tablet Comments:   Reason for Stopping:             Allergies   Allergies   Allergen Reactions     Codeine      Hives       Codeine Sulfate Hives     Penicillins Hives     [place] Hives     Data   Most Recent 3 CBC's:  Recent Labs   Lab Test 07/17/20  0907 07/16/20  1413 11/06/18  1124   WBC 5.7 7.1 6.7   HGB 10.8* 11.1* 12.5   MCV 84 83 89    236 185      Most Recent 3 BMP's:  Recent Labs   Lab Test 07/19/20  0952 07/18/20  0638 07/17/20  0907   * 130* 127*   POTASSIUM 3.5 3.7 3.9   CHLORIDE 101 100 97   CO2 22 25 22   BUN 19 13 18   CR 0.60 0.78 0.90   ANIONGAP 8 5 8   YURI 8.5 8.4* 8.5   * 84 82     Most Recent 2 LFT's:  Recent Labs   Lab Test 07/16/20  1413 05/03/19  1232   AST 40 27   ALT 35 20   ALKPHOS 55 52   BILITOTAL 0.5 0.6     Most Recent INR's and Anticoagulation Dosing History:  Anticoagulation Dose History     Recent Dosing and Labs Latest Ref Rng & Units 6/25/2007 5/26/2015    INR 0.86 - 1.14 0.95 1.03        Most Recent 3 Troponin's:No lab results found.  Most Recent Cholesterol Panel:  Recent Labs   Lab Test 05/03/19  1232   CHOL 128   LDL 69   HDL 38*   TRIG 103     Most Recent 6 Bacteria Isolates From Any Culture (See EPIC Reports for Culture Details):No lab results found.  Most Recent TSH, T4 and A1c Labs:  Recent Labs   Lab Test 05/03/19  1232  10/11/17  1410   TSH  --   --  2.29   A1C 5.3   < > 5.7    < > = values in this interval not displayed.     Results for orders placed or performed during the hospital encounter of 07/16/20   Head CT w/o contrast    Narrative    CT SCAN OF THE HEAD WITHOUT CONTRAST   7/16/2020 3:18 PM     HISTORY: Fall, confusion.    TECHNIQUE: Axial images of the head and coronal reformations without  IV contrast material. Radiation dose for this scan was reduced using  automated exposure control,  adjustment of the mA and/or kV according  to patient size, or iterative reconstruction technique.    COMPARISON: Head MRI 7/12/2018, head CT 5/26/2015    FINDINGS: Moderate volume loss is present. White matter  hypoattenuation likely represents moderate chronic small vessel  ischemic change. The cerebral hemispheres, brainstem, and cerebellum  otherwise demonstrate normal morphology and attenuation. No evidence  of acute ischemia, hemorrhage, mass, mass effect or hydrocephalus. The  visualized calvarium, tympanic cavities, mastoid cavities, and  paranasal sinuses are unremarkable.      Impression    IMPRESSION: No acute intracranial abnormality.    DARELL HUYNH MD   XR Pelvis w Hip Left 1 View    Narrative    XR PELVIS AND LEFT HIP ONE VIEW   7/16/2020 3:56 PM     HISTORY: Fall, left hip pain.    COMPARISON: 3/24/2020 x-ray.      Impression    IMPRESSION: Advanced right hip degenerative changes with complete loss  of joint space. This is unchanged.    Left hip joint space is fairly well-preserved with some early  acetabular marginal osteophyte formation. No evidence of acute  fracture.    CORINNE CARRERO MD

## 2020-07-20 NOTE — LETTER
"    7/20/2020        RE: Reanna Garza  [address] [place] [id number]         [facility]  Reanna Garza is being evaluated via a billable video visit due to the restrictions of the Covid-19 pandemic.   The patient has been notified of following:  \"This video visit will be conducted via a call between you and your provider. We have found that certain health care needs can be provided without the need for an in-person physical exam.  This service lets us provide the care you need with a video conversation. If during the course of the call the provider feels a video visit is not appropriate, you will not be charged for this service.\"   The provider has received verbal consent for a Video Visit from the patient and or first contact? Yes  Patient/facility staff would like the video invitation sent by: N/A   Video Start Time: 1437  Which Facility the Patient is at during the time of visit: [facility]     PRIMARY CARE PROVIDER AND CLINIC:  Chelita Curtis MD, [address] [place] [id number]  Chief Complaint   Patient presents with     Hospital F/U     [facility] Record Number:  [id number]  Reanna Garza  is a 89 year old  (12/25/1927), admitted to the above facility from  [facility]. Hospital stay 7/16/20 through 7/19/20..  Admitted to this facility for  rehab, medical management and nursing care.  HPI:    HPI information obtained from: facility chart records, facility staff, patient report, [facility] chart review and Care Everywhere [facility] chart review.   Brief Summary of Hospital Course:   Patient hospitalized during above noted dates following gradual increase in weakness in falls; was noted to be suffering from hyponatremia and dehydration following being treat for a UTI recently. No noted fractures; Hyponatremia stabilized through IVF - stopped hydrochlorothiazide inpatient - recommending repeat BMP for " monitoring of kidney function and electrolytes. A1c inpatient also noted low at 5.3- PTA Glipizide stopped for concern of underlying hypoglycemia. Chronic conditions of HTN HLD and Hx of CVA stable throughout stay.    Updates on Status Since Skilled nursing Admission:   Patient seen today for admission visit to TCU. Pleasant elderly woman seen upright in room in w/c - she notes she is doing well since admission. She slept well last night, appetite is better. She remembers bits and pieces of recent hospitalization, thus we discuss the need for her to ensure adequate oral intake to prevent future problems - she verbalizes understanding of this. She has no acute concerns at this time. Denies any presence of pain. Denies CP, palpitations, fatigue, nausea, vomiting, increased SOB/MARTINEZ, fever, chills, and/or b/b concerns today.    CODE STATUS/ADVANCE DIRECTIVES DISCUSSION:   CPR/Full code   Patient's living condition: lives with spouse and adult children  ALLERGIES: Codeine; Codeine sulfate; Penicillins; and [place]  PAST MEDICAL HISTORY:  has a past medical history of Abdominal aneurysm without mention of rupture, Arthritis, CVA (cerebral infarction), DJD (degenerative joint disease), Hyperlipidemia LDL goal <130, Hypertension goal BP (blood pressure) < 140/90, Low back pain, Right hip pain, Stroke (H), and Type 2 diabetes, HbA1C goal < 8% (H).  PAST SURGICAL HISTORY:   has a past surgical history that includes TOTAL KNEE ARTHROPLASTY (6/04); CHOLECYSTOENTEROSTOMY; SHOULDER ARTHROSCOPY, DX (12/05); TOTAL ABDOM HYSTERECTOMY; replacement socket, shoulder disarticulation/interscapular thoracic, molded to (6/07); Extracapsular cataract extration with intraocular lens implant; and Blepharoplasty, brow lift, combined (6/10/2013).  FAMILY HISTORY: family history includes Alzheimer Disease in her sister; Breast Cancer in her sister; Cancer in her brother, sister, sister, sister, and sister.  SOCIAL HISTORY:   reports that she  "quit smoking about 20 years ago. She has never used smokeless tobacco. She reports current alcohol use. She reports that she does not use drugs.  Current Outpatient Medications   Medication Sig Dispense Refill     acetaminophen 650 MG TABS Take 650 mg by mouth every 4 hours as needed for mild pain or fever 100 tablet      aspirin 81 MG EC tablet Take 81 mg by mouth 2 times daily       blood glucose (ACCU-CHEK PATTI PLUS) test strip USE AS DIRECTED TO TEST BLOOD SUGAR once daily or as directed. 90 strip 1     blood glucose monitoring (ACCU-CHEK PATTI PLUS) meter device kit Use to test blood sugars 4 times daily or as directed. 1 kit 0     blood glucose monitoring (ACCU-CHEK MULTICLIX) lancets Use to test blood sugar 4 times daily. 400 each 3     Blood Glucose Monitoring Suppl (ACCU-CHEK PATTI) DEANNA 1 Device daily       blood glucose test strip Use to test blood sugar 2 times daily or as directed. 1 Box prn     calcium citrate-vitamin D (CITRACAL) 315-200 MG-UNIT TABS per tablet Take 1 tablet by mouth daily       Ibuprofen (ADVIL PO) Take 200 mg by mouth every 6 hours as needed        losartan (COZAAR) 50 MG tablet Take 50 mg by mouth At Bedtime       lovastatin (MEVACOR) 40 MG tablet TAKE 1 TABLET BY MOUTH EVERYDAY AT BEDTIME 90 tablet 3     melatonin 3 MG tablet Take 6 mg by mouth At Bedtime       multivitamin (CENTRUM SILVER) tablet Take 1 tablet by mouth daily       multivitamin (OCUVITE) TABS tablet Take 1 tablet by mouth daily       vitamin D3 (CHOLECALCIFEROL) 50 mcg (2000 units) tablet Take 1 tablet by mouth 2 times daily        ROS: 10 point ROS of systems including Constitutional, Eyes, Respiratory, Cardiovascular, Gastroenterology, Genitourinary, Integumentary, Musculoskeletal, Psychiatric were all negative except for pertinent positives noted in my HPI.  Vitals:BP (!) 142/68   Pulse 53   Temp 98.2  F (36.8  C)   Resp 18   Ht 1.626 m (5' 4\")   Wt 73.5 kg (162 lb)   SpO2 99%   BMI 27.81 kg/m   "   Limited Visit Exam done given COVID-19 precautions:  GENERAL APPEARANCE:  Alert, in no distress, appears healthy, oriented, cooperative, cooperative elderly woman upright in w/c in room  EYES:  EOM, conjunctivae, lids, pupils and irises normal  RESP:  no respiratory distress, cough - none  CV:  no edema  M/S:   Gait and station abnormal - AYDE 2/2 patient being in w/c  Digits and nails abnormal - arthritic changes present  SKIN:  Inspection of skin and subcutaneous tissue baseline, skin thin with multiple areas of ecchymosis of varying stages of healing  PSYCH:  oriented X 3, normal insight, judgement and memory, affect and mood normal, mildly forgetful throughout conversation    Lab/Diagnostic data:  Recent labs in [facility] reviewed by me today.     ASSESSMENT/PLAN:  (E86.0) Dehydration  (primary encounter diagnosis)  Comment: Thought to be 2/2 decreased PO intake and PTA regimen of hydrochlorothiazide. Resolved inpatient following IVF.  Plan: Monitor closely; BMP 7/21    (E87.1) Hyponatremia  Comment: Thought to be 2/2 hydrochlorothiazide and decreased PO intake - resolved inpatient with IVF  Plan: Monitor closely; BMP as above    (F01.50) Vascular dementia without behavioral disturbance (H)  Comment: Chronic; progressive; continues to require 24-hr supportive cares provided in-home by patient's daughter; no acute concerns re:this at this time. No noted behaviors - patient forgetful but appropriate throughout conversation.  Plan: Chronic - continue with current POC and placement as these remain appropriate; SW to assist with discharge planning.    (E11.9) Type 2 diabetes mellitus without complication, without long-term current use of insulin (H)  Comment: Chronic - A1c low inpatient; stopped Glipizide  Lab Results   Component Value Date    A1C 5.3 05/03/2019    A1C 5.9 02/22/2019    A1C 5.7 11/06/2018    A1C 5.7 03/29/2018    A1C 6.3 01/09/2018   Last BG Levels:    AM: 105    Noon:172  Plan: Stable without  intervention at this time; continue to monitor QID BG as ordered for discharge planning and assessment of need for medications prior to discharge home    (I10) Hypertension goal BP (blood pressure) < 140/90  Comment: Chronic - managed PTA on regimen of hydrochlorothiazide and Cozaar; hydrochlorothiazide stopped inpatient 2/2 above noted diagnoses  Last BPs: 138/82, 142/68, 158/71  Plan: Continue medications as ordered; VS per facility protocol - adjust medications as needed for goal of < 140/90. BMP as above    (R53.81) Physical deconditioning  Comment: 2/2 above noted diagnoses and recent hosptializaiton  Plan: PT/OT eval/tx - adv per their recommendation; SW to assist with discharge planning    Orders written by provider at facility  -CBC/BMP    Electronically signed by:  MARGARITA Rios, DNP, A/GNP-[facility]  [facility] [address] [address]     Cell: [phone]  Fax: [phone]  Email: [email or web address]         Video-Visit Details  Type of service:  Video Visit  Video End Time (time video stopped): 1455  Distant Location (provider location):  [facility]                 Sincerely,        MARGARITA Levy CNP

## 2020-07-20 NOTE — PROGRESS NOTES
Clinic Care Coordination Contact  [facility]/Voicemail     Clinical Data: Care Coordinator Outreach  Outreach attempted x 2.  Left message on patient's daughter's voicemail with call back information and requested return call.    Plan: Care Coordinator will send unable to contact letter with care coordinator contact information via mail. Care Coordinator will do no further outreaches at this time.

## 2020-07-20 NOTE — PROGRESS NOTES
" [facility]  Reanna Garza is being evaluated via a billable video visit due to the restrictions of the Covid-19 pandemic.   The patient has been notified of following:  \"This video visit will be conducted via a call between you and your provider. We have found that certain health care needs can be provided without the need for an in-person physical exam.  This service lets us provide the care you need with a video conversation. If during the course of the call the provider feels a video visit is not appropriate, you will not be charged for this service.\"   The provider has received verbal consent for a Video Visit from the patient and or first contact? Yes  Patient/facility staff would like the video invitation sent by: N/A   Video Start Time: 1437  Which Facility the Patient is at during the time of visit: [facility]     PRIMARY CARE PROVIDER AND CLINIC:  Chelita Curtis MD, [address] [place] [address] / [facility] [address]  Chief Complaint   Patient presents with     Hospital F/U     [facility] Medical Record Number:  [id number]  Reanna Garza  is a 89 year old  (12/25/1927), admitted to the above facility from  [facility]. Hospital stay 7/16/20 through 7/19/20..  Admitted to this facility for  rehab, medical management and nursing care.  HPI:    HPI information obtained from: facility chart records, facility staff, patient report, [facility] chart review and Care Everywhere [facility] chart review.   Brief Summary of Hospital Course:   Patient hospitalized during above noted dates following gradual increase in weakness in falls; was noted to be suffering from hyponatremia and dehydration following being treat for a UTI recently. No noted fractures; Hyponatremia stabilized through IVF - stopped hydrochlorothiazide inpatient - recommending repeat BMP for monitoring of kidney function and electrolytes. A1c inpatient also noted low at 5.3- PTA Glipizide stopped " for concern of underlying hypoglycemia. Chronic conditions of HTN HLD and Hx of CVA stable throughout stay.    Updates on Status Since Skilled nursing Admission:   Patient seen today for admission visit to TCU. Pleasant elderly woman seen upright in room in w/c - she notes she is doing well since admission. She slept well last night, appetite is better. She remembers bits and pieces of recent hospitalization, thus we discuss the need for her to ensure adequate oral intake to prevent future problems - she verbalizes understanding of this. She has no acute concerns at this time. Denies any presence of pain. Denies CP, palpitations, fatigue, nausea, vomiting, increased SOB/MARTINEZ, fever, chills, and/or b/b concerns today.    CODE STATUS/ADVANCE DIRECTIVES DISCUSSION:   CPR/Full code   Patient's living condition: lives with spouse and adult children  ALLERGIES: Codeine; Codeine sulfate; Penicillins; and [place]  PAST MEDICAL HISTORY:  has a past medical history of Abdominal aneurysm without mention of rupture, Arthritis, CVA (cerebral infarction), DJD (degenerative joint disease), Hyperlipidemia LDL goal <130, Hypertension goal BP (blood pressure) < 140/90, Low back pain, Right hip pain, Stroke (H), and Type 2 diabetes, HbA1C goal < 8% (H).  PAST SURGICAL HISTORY:   has a past surgical history that includes TOTAL KNEE ARTHROPLASTY (6/04); CHOLECYSTOENTEROSTOMY; SHOULDER ARTHROSCOPY, DX (12/05); TOTAL ABDOM HYSTERECTOMY; replacement socket, shoulder disarticulation/interscapular thoracic, molded to (6/07); Extracapsular cataract extration with intraocular lens implant; and Blepharoplasty, brow lift, combined (6/10/2013).  FAMILY HISTORY: family history includes Alzheimer Disease in her sister; Breast Cancer in her sister; Cancer in her brother, sister, sister, sister, and sister.  SOCIAL HISTORY:   reports that she quit smoking about 20 years ago. She has never used smokeless tobacco. She reports current alcohol use. She  "reports that she does not use drugs.  Current Outpatient Medications   Medication Sig Dispense Refill     acetaminophen 650 MG TABS Take 650 mg by mouth every 4 hours as needed for mild pain or fever 100 tablet      aspirin 81 MG EC tablet Take 81 mg by mouth 2 times daily       blood glucose (ACCU-CHEK PATTI PLUS) test strip USE AS DIRECTED TO TEST BLOOD SUGAR once daily or as directed. 90 strip 1     blood glucose monitoring (ACCU-CHEK PATTI PLUS) meter device kit Use to test blood sugars 4 times daily or as directed. 1 kit 0     blood glucose monitoring (ACCU-CHEK MULTICLIX) lancets Use to test blood sugar 4 times daily. 400 each 3     Blood Glucose Monitoring Suppl (ACCU-CHEK PATTI) DEANNA 1 Device daily       blood glucose test strip Use to test blood sugar 2 times daily or as directed. 1 Box prn     calcium citrate-vitamin D (CITRACAL) 315-200 MG-UNIT TABS per tablet Take 1 tablet by mouth daily       Ibuprofen (ADVIL PO) Take 200 mg by mouth every 6 hours as needed        losartan (COZAAR) 50 MG tablet Take 50 mg by mouth At Bedtime       lovastatin (MEVACOR) 40 MG tablet TAKE 1 TABLET BY MOUTH EVERYDAY AT BEDTIME 90 tablet 3     melatonin 3 MG tablet Take 6 mg by mouth At Bedtime       multivitamin (CENTRUM SILVER) tablet Take 1 tablet by mouth daily       multivitamin (OCUVITE) TABS tablet Take 1 tablet by mouth daily       vitamin D3 (CHOLECALCIFEROL) 50 mcg (2000 units) tablet Take 1 tablet by mouth 2 times daily        ROS: 10 point ROS of systems including Constitutional, Eyes, Respiratory, Cardiovascular, Gastroenterology, Genitourinary, Integumentary, Musculoskeletal, Psychiatric were all negative except for pertinent positives noted in my HPI.  Vitals:BP (!) 142/68   Pulse 53   Temp 98.2  F (36.8  C)   Resp 18   Ht 1.626 m (5' 4\")   Wt 73.5 kg (162 lb)   SpO2 99%   BMI 27.81 kg/m     Limited Visit Exam done given COVID-19 precautions:  GENERAL APPEARANCE:  Alert, in no distress, appears healthy, " oriented, cooperative, cooperative elderly woman upright in w/c in room  EYES:  EOM, conjunctivae, lids, pupils and irises normal  RESP:  no respiratory distress, cough - none  CV:  no edema  M/S:   Gait and station abnormal - AYDE 2/2 patient being in w/c  Digits and nails abnormal - arthritic changes present  SKIN:  Inspection of skin and subcutaneous tissue baseline, skin thin with multiple areas of ecchymosis of varying stages of healing  PSYCH:  oriented X 3, normal insight, judgement and memory, affect and mood normal, mildly forgetful throughout conversation    Lab/Diagnostic data:  Recent labs in [facility] reviewed by me today.     ASSESSMENT/PLAN:  (E86.0) Dehydration  (primary encounter diagnosis)  Comment: Thought to be 2/2 decreased PO intake and PTA regimen of hydrochlorothiazide. Resolved inpatient following IVF.  Plan: Monitor closely; BMP 7/21    (E87.1) Hyponatremia  Comment: Thought to be 2/2 hydrochlorothiazide and decreased PO intake - resolved inpatient with IVF  Plan: Monitor closely; BMP as above    (F01.50) Vascular dementia without behavioral disturbance (H)  Comment: Chronic; progressive; continues to require 24-hr supportive cares provided in-home by patient's daughter; no acute concerns re:this at this time. No noted behaviors - patient forgetful but appropriate throughout conversation.  Plan: Chronic - continue with current POC and placement as these remain appropriate; SW to assist with discharge planning.    (E11.9) Type 2 diabetes mellitus without complication, without long-term current use of insulin (H)  Comment: Chronic - A1c low inpatient; stopped Glipizide  Lab Results   Component Value Date    A1C 5.3 05/03/2019    A1C 5.9 02/22/2019    A1C 5.7 11/06/2018    A1C 5.7 03/29/2018    A1C 6.3 01/09/2018   Last BG Levels:    AM: 105    Noon:172  Plan: Stable without intervention at this time; continue to monitor QID BG as ordered for discharge planning and assessment of need for medications  prior to discharge home    (I10) Hypertension goal BP (blood pressure) < 140/90  Comment: Chronic - managed PTA on regimen of hydrochlorothiazide and Cozaar; hydrochlorothiazide stopped inpatient 2/2 above noted diagnoses  Last BPs: 138/82, 142/68, 158/71  Plan: Continue medications as ordered; VS per facility protocol - adjust medications as needed for goal of < 140/90. BMP as above    (R53.81) Physical deconditioning  Comment: 2/2 above noted diagnoses and recent hosptializaiton  Plan: PT/OT eval/tx - adv per their recommendation; SW to assist with discharge planning    Orders written by provider at facility  -CBC/BMP    Electronically signed by:  Dr. Rachel Ge, APRN, DNP, A/GNP-[facility]  [address] [place] [address]     Cell: [phone]  Fax: [phone]  Email: [email or web address]         Video-Visit Details  Type of service:  Video Visit  Video End Time (time video stopped): 1455  Distant Location (provider location):  [facility]

## 2020-07-20 NOTE — PATIENT INSTRUCTIONS
Orders from today's visit  -CBC/BMP 7/21 - Dx. Falls    Dr. Rachel Ge, APRN, DNP, A/GNP-[facility]  [address] [place] [address] [address]  [place] [address]     Cell: [phone]  Fax: [phone]  Email: [email or web address]

## 2020-07-21 NOTE — TELEPHONE ENCOUNTER
Is in TCU: [facility].  See virtual visit dated 7/20/2020

## 2020-07-21 NOTE — LETTER
July 24, 2020        Reanna Garza  [address] [place] [id number]    COVID-19 Virus PCR to [facility] - Result   Date Value Ref Range Status   07/21/2020 Not Detected  Final     Comment:     Collection of multiple specimens from the same patient may be necessary to   detect the virus. The possibility of a false negative should be considered if   the patient's recent exposure or clinical presentation suggests 2019 nCOV   infection and diagnostic tests for other causes of illness are negative.   Repeat testing may be considered in this setting.  Viral RNA was extracted via a validated method and subsequently underwent   single step reverse transcriptase-real time polymerase chain reaction using   primers to the CDC specified N1,N2 gene targets of CoV2 and human RNP as an   internal control.  A negative result does not rule out the presence of real-time PCR inhibitors   in the specimen or COVID-19 RNA in concentrations below the limit of detection   of the assay. The possibility of a false negative should be considered if the   patients recent exposure or clinical presentation suggests COVID-19.   Additional testing or repeat testing requires consultation with the   laboratory.  Nasopharyngeal specimen is the preferred choice for swab-based SARS CoV2   testing. When collection of a nasopharyngeal swab is not possible the   following are acceptable alternatives:  an oropharyngeal (OP) specimen collected by a healthcare professional, or a   nasal mid-turbinate (NMT) swab collected by a healthcare professional or by   onsite self-collection (using a flocked tapered swab), or an anterior nares   specimen collected by a healthcare professional or by onsite self-collection   (using a round foam swab). (Centers for Disease Control)  Testing performed by [facility], Room 1-210, [address] [place] [address]. This test was developed and its   performance  characteristics determined by the [email or web address]   Center. It has not been cleared or approved by the FDA.  The laboratory is regulated under the Clinical Laboratory Improvement   Amendments of 1988 (CLIA-88) as qualified to perform high-complexity testing.   This test is used for clinical purposes. It should not be regarded as   investigational or for research.         No results found for: SARSCOVRES    This letter provides a written record that you were tested for COVID-19.      Your result was negative. This means that we didn t find the virus that causes COVID-19 in your sample. A test may show negative when you do actually have the virus. This can happen when the virus is in the early stages of infection, before you feel illness symptoms.    If you have symptoms   Stay home and away from others (self-isolate) until you meet ALL of the guidelines below:    You ve had no fever--and no medicine that reduces fever--for 3 full days (72 hours). And      Your other symptoms have gotten better. For example, your cough or breathing has improved. And     At least 10 days have passed since your symptoms started.    During this time:    Stay home. Don t go to work, school or anywhere else.     Stay in your own room, including for meals. Use your own bathroom if you can.    Stay away from others in your home. No hugging, kissing or shaking hands. No visitors.    Clean  high touch  surfaces often (doorknobs, counters, handles, etc.). Use a household cleaning spray or wipes. You can find a full list on the EPA website at www.epa.gov/pesticide-registration/list-n-disinfectants-use-against-sars-cov-2.    Cover your mouth and nose with a mask, tissue or washcloth to avoid spreading germs.    Wash your hands and face often with soap and water.    Going back to work  Check with your employer for any guidelines to follow for going back to work.    Employers: This document serves as formal notice that your  employee tested negative for COVID-19, as of the testing date shown above.

## 2020-07-27 NOTE — PROGRESS NOTES
[facility] DISCHARGE SUMMARY  PATIENT'S NAME: Reanna Garza  YOB: 1927  MEDICAL RECORD NUMBER:  [id number]  Place of Service where encounter took place:  [facility] ([facility]) [[id number]]    PRIMARY CARE PROVIDER AND CLINIC RESPONSIBLE AFTER TRANSFER:   Chelita Curtis MD, [address] / [facility] [address]    [facility] Provider     Transferring providers: Lily Llanos, MARGARITA ZAIDI, MD Eleanor  Recent Hospitalization/ED:  [facility] stay 7/16/20 to 7/19/20.  Date of SNF Admission: July / 19 / 2020  Date of SNF (anticipated) Discharge: July / 28 / 2020  Discharged to: with family dgtr  Cognitive Scores/ Physical Function/DME:     Skilled need/Clinical issue/Rehab: PT/OT   Ambulation: CGA min A with FWW   Sit to Stand: Min A   Transfers: Min A   UB Dressing: Min A   LB Dressing: Max A   Toileting: Max A      CODE STATUS/ADVANCE DIRECTIVES DISCUSSION:  Full Code   ALLERGIES: Codeine; Codeine sulfate; Penicillins; and [place]    DISCHARGE DIAGNOSIS/NURSING FACILITY COURSE:     Reanna Benavides is a 89 yr old female admitted to [facility] for rehabilitation s/p hospitalization [facility] 7/16-7/19/20 for weakness, falls, dehydration, hypoglycemia  (glipizide stopped), hyponatremia (hydrochlorothiazide stopped) and recent urinary tract infection (Bactrim course 7/14 started and clinically urinary tract infection signs and symptoms resolve)    Head CT neg. Xray L hip neg for fracture. Received IV fluids with improved status  PMHx belén,CVA/TIA, hypertension, HLD, and DMII    TODAY  Patient states she feels fine  Therapies report patient near baseline. Family provides 24hr care and wish to take patient home tomorrow for her to be home for patient's spouse birthday. Lives at home with daughter. Daughter quit job to provide care for her mother       Generalized Weakness, Multiple Falls  Dehydration: Improved  Moderate  Dementia  Weakness/falls likey multifactorial -- dt hyponatremia, mild hypoglycemia, dehydration (elevated BUN/Cr noted on initial BMP).   Transfer contact guard assist to moderate assist with transfers  Plans to discharge home tomorrow with homecare per patient family request. Family provide 24hr care.     Hyponatremia   hospital admission. BMP within normal limits 7/21/20  Follow up BMP and with primary care provider within 1-2wk  Hydrochlorothiazide discontinued     DM II  Mild hypoglycemia on admission: Improved  A1C was 5.3 in 5/2019. blood sugar 67 on admission hospital   glipizide 2.5mg 2 x daily discontinued in hospital due to hypoglycemia and blood sugar managed off glipizide     Hypertension  Hyperlipidemia  Hx of CVA/TIA  blood pressure managed,   Plan continue aspirin 81mg BID, lovastatin 40mg HS, & losartan 50mg HS  stop hydrochlorothiazide due to hyponatremia    Arthritis  Denies pain this AM  Continue as needed tylenol and Ibuprofen (limit NSAID due to side effect profile)         Past Medical History:  has a past medical history of Abdominal aneurysm without mention of rupture, Arthritis, CVA (cerebral infarction), DJD (degenerative joint disease), Hyperlipidemia LDL goal <130, Hypertension goal BP (blood pressure) < 140/90, Low back pain, Right hip pain, Stroke (H), and Type 2 diabetes, HbA1C goal < 8% (H).    Discharge Medications:    Current Outpatient Medications   Medication Sig Dispense Refill     acetaminophen 650 MG TABS Take 650 mg by mouth every 4 hours as needed for mild pain or fever 100 tablet      aspirin 81 MG EC tablet Take 81 mg by mouth 2 times daily       calcium citrate-vitamin D (CITRACAL) 315-200 MG-UNIT TABS per tablet Take 1 tablet by mouth daily       Ibuprofen (ADVIL PO) Take 200 mg by mouth every 6 hours as needed        losartan (COZAAR) 50 MG tablet Take 50 mg by mouth At Bedtime       lovastatin (MEVACOR) 40 MG tablet TAKE 1 TABLET BY MOUTH EVERYDAY AT BEDTIME 90  "tablet 3     melatonin 3 MG tablet Take 6 mg by mouth At Bedtime       multivitamin (CENTRUM SILVER) tablet Take 1 tablet by mouth daily       multivitamin (OCUVITE) TABS tablet Take 1 tablet by mouth daily       vitamin D3 (CHOLECALCIFEROL) 50 mcg (2000 units) tablet Take 1 tablet by mouth 2 times daily       Medication Changes/Rationale:         Controlled medications sent with patient:   not applicable/none     Last Comprehensive Metabolic Panel:  Sodium   Date Value Ref Range Status   07/21/2020 135 133 - 144 mmol/L Final     Potassium   Date Value Ref Range Status   07/21/2020 3.5 3.4 - 5.3 mmol/L Final     Chloride   Date Value Ref Range Status   07/21/2020 102 94 - 109 mmol/L Final     Carbon Dioxide   Date Value Ref Range Status   07/21/2020 26 20 - 32 mmol/L Final     Anion Gap   Date Value Ref Range Status   07/21/2020 7 3 - 14 mmol/L Final     Glucose   Date Value Ref Range Status   07/21/2020 85 70 - 99 mg/dL Final     Urea Nitrogen   Date Value Ref Range Status   07/21/2020 20 7 - 30 mg/dL Final     Creatinine   Date Value Ref Range Status   07/21/2020 0.67 0.52 - 1.04 mg/dL Final     GFR Estimate   Date Value Ref Range Status   07/21/2020 76 >60 mL/min/[1.73_m2] Final     Comment:     Non  GFR Calc  Starting 12/18/2018, serum creatinine based estimated GFR (eGFR) will be   calculated using the Chronic Kidney Disease Epidemiology Collaboration   (CKD-EPI) equation.       Calcium   Date Value Ref Range Status   07/21/2020 9.1 8.5 - 10.1 mg/dL Final          ROS:   4 point ROS including Respiratory, CV, GI and , other than that noted in the HPI,  is negative    Physical Exam:   Vitals: BP (!) 148/73   Pulse 78   Temp 96.9  F (36.1  C)   Resp 18   Ht 1.626 m (5' 4\")   Wt 72.6 kg (160 lb 1.6 oz)   SpO2 97%   BMI 27.48 kg/m    BMI= Body mass index is 27.48 kg/m .     Exam:  GENERAL APPEARANCE:  in no distress  ENT:  Mouth and posterior oropharynx normal, moist mucous membranes  EYES: "  EOM, conjunctivae, lids, pupils and irises normal  NECK:  No adenopathy,masses or thyromegaly  RESP:  no respiratory distress  M/S:   2 assist transfer at visit  SKIN:  Inspection of skin and subcutaneous tissue baseline  NEURO:   Cranial nerves 2-12 are normal tested and grossly at patient's baseline  PSYCH:  memory impaired       SNF labs: Labs done in SNF are in [facility]. Please refer to them using [facility]/Care Everywhere.      DISCHARGE PLAN:    Follow up labs: [facility]  1-2    Medical Follow Up:      Follow up with primary care provider in 1-2 week    MTM referral needed and placed by this provider: No    Current [facility] scheduled appointments:       Discharge Services: Home Care:  Occupational Therapy, Physical Therapy, Registered Nurse and Home Health Aide      TOTAL DISCHARGE TIME:   Greater than 30 minutes  Electronically signed by:  MARGARITA Boucher CNP     Home care Face to Face documentation done in [facility] attached to Home care orders for [facility].

## 2020-07-28 NOTE — TELEPHONE ENCOUNTER
Pt having [facility] see patient.     ANUP Silva RN BSN  [facility]  Cell: [phone] *No need to call-I check EPIC Everyday*

## 2020-07-29 NOTE — TELEPHONE ENCOUNTER
Reason for Call: Request for an order or referral:    Order or referral being requested: [facility] is requesting orders for PT -  2 x a week for 1 week, 3 x a week for 2 weeks, 2 x week for 2 weeks, 1 x a week for 2 weeks following hospitalization for dehydration.    Is also requesting home health aid 1 x a week for 1 week for 7 weeks, and an OT evaluation for ADL    Date needed: as soon as possible    Has the patient been seen by the PCP for this problem? Not Applicable    Phone number Patient can be reached at: [phone]    Best Time: Any    Can we leave a detailed message on this number?  YES    Call taken on 7/29/2020 at 1:00 PM by Chari Arenas

## 2020-07-29 NOTE — TELEPHONE ENCOUNTER
Discharged from SNF yesterday  Last seen at primary care clinic on 7/14/20    OK for below orders given    Santa Paiz, RN, BSN

## 2020-08-28 NOTE — LETTER
8/28/2020     RE: Reanna Garza  [address] [id number]     Dear Colleague,    Thank you for referring your patient, Reanna Garza, to the [facility]UROSPECIALTIES at [facility]. Please see a copy of my visit note below.    Chief Complaint: memory problem     History of Present Illness:  Ms. Garza is a 89 year old  female with history of DM,  presenting for evaluation of memory problems. She is accompanied to today's visit by daughter Belle,  Krupa, who assists in providing the history.    Her daughter reports that Ms. Garza started having memory problems since the stroke 5 years ago.  She had weakness on the left side.  Memory problem progressed after the stroke.  Memory is a consistent problem. Recently, she has pattie showing symptoms of sundowning, which include becoming more confused, her legs give out on her, talking to people who are not there, and she is confused about her family members.    She has developed hallucination about 2-3 years ago.   When her blood sugar is high, her memory is worse.  She says that her house is someone else's house, but looks like her house.  She doesn't know where the bathroom is.   She is verbally aggressive at times.      She can go to the bathroom by herself.     Sometimes she can't get back to sleep once every 1-2 weeks.      She is not taking donepezil and memantine.      IADL: She cleaned the kitchen.  Daughter is doing all of the cooking since about 2-3 years.      She lives with her daughter and son-in-law.     Patient has PT twice a week, since July 28.      Medical review of systems: The comprehensive review of systems is otherwise reviewed and negative.       Patient Active Problem List   Diagnosis     Abdominal aortic aneurysm (H)     Restless legs syndrome (RLS)     Abnormality of gait     Imbalance     HYPERLIPIDEMIA LDL GOAL <100     Low back pain     Lumbar spondylosis     Right hip  pain     Trochanteric bursitis     Hypertension goal BP (blood pressure) < 140/90     Shoulder pain     Disturbance of skin sensation     Macular degeneration (senile) of retina     Chronic rhinitis     Stroke (H)     Vitamin D deficiency     Advanced care planning/counseling discussion     Type 2 diabetes mellitus without complication (H)     Confusion     History of TIA (transient ischemic attack) and stroke     Vascular dementia without behavioral disturbance (H)     Hypokalemia     Hyponatremia       Past Medical History:   Diagnosis Date     Abdominal aneurysm without mention of rupture      Arthritis      CVA (cerebral infarction)      DJD (degenerative joint disease)      Hyperlipidemia LDL goal <130      Hypertension goal BP (blood pressure) < 140/90      Low back pain      Right hip pain      Stroke (H)      Type 2 diabetes, HbA1C goal < 8% (H)      She had normal childhood development and reports no major head injuries.     Past Surgical History:   Procedure Laterality Date     BLEPHAROPLASTY, BROW LIFT, COMBINED  6/10/2013    Procedure: COMBINED BLEPHAROPLASTY, BROW LIFT;  BILATERAL BLEPHAROPLASTY WITH INTERNAL BROW LIFT;  Surgeon: Chip Dai MD;  [facility]:  [facility]     C CHOLECYSTOENTEROSTOMY      thaddeus     C TOTAL ABDOM HYSTERECTOMY       C TOTAL KNEE ARTHROPLASTY  6/04    x2     EXTRACAPSULAR CATARACT EXTRATION WITH INTRAOCULAR LENS IMPLANT      both eyes     HC SHOULDER ARTHROSCOPY, DX  12/05    rotator cuff tear repair     REPLACEMENT SOCKET, SHOULDER DISARTICULATION/INTERSCAPULAR THORACIC, MOLDED TO  6/07    right       Current Outpatient Medications   Medication Sig Dispense Refill     acetaminophen 650 MG TABS Take 650 mg by mouth every 4 hours as needed for mild pain or fever 100 tablet      aspirin 81 MG EC tablet Take 81 mg by mouth 2 times daily       calcium citrate-vitamin D (CITRACAL) 315-200 MG-UNIT TABS per tablet Take 1 tablet by mouth daily       Ibuprofen (ADVIL PO) Take 200 mg by  mouth every 6 hours as needed        losartan (COZAAR) 50 MG tablet Take 50 mg by mouth At Bedtime       lovastatin (MEVACOR) 40 MG tablet TAKE 1 TABLET BY MOUTH EVERYDAY AT BEDTIME 90 tablet 3     melatonin 3 MG tablet Take 6 mg by mouth At Bedtime       multivitamin (CENTRUM SILVER) tablet Take 1 tablet by mouth daily       multivitamin (OCUVITE) TABS tablet Take 1 tablet by mouth daily       vitamin D3 (CHOLECALCIFEROL) 50 mcg (2000 units) tablet Take 1 tablet by mouth 2 times daily          Allergies   Allergen Reactions     Codeine      Hives       Codeine Sulfate Hives     Penicillins Hives     [place] Hives       Family History   Problem Relation Age of Onset     Cancer Brother      Cancer Sister      Breast Cancer Sister      Cancer Sister      Cancer Sister      Cancer Sister      Alzheimer Disease Sister        There is otherwise no family history of neurodegenerative disease.     Social History     Socioeconomic History     Marital status:      Spouse name: Not on file     Number of children: Not on file     Years of education: Not on file     Highest education level: Not on file   Occupational History     Not on file   Social Needs     Financial resource strain: Not on file     Food insecurity     Worry: Not on file     Inability: Not on file     Transportation needs     Medical: Not on file     Non-medical: Not on file   Tobacco Use     Smoking status: Former Smoker     Last attempt to quit: 1999     Years since quittin.8     Smokeless tobacco: Never Used   Substance and Sexual Activity     Alcohol use: Yes     Comment: rare     Drug use: No     Sexual activity: Yes     Partners: Male   Lifestyle     Physical activity     Days per week: Not on file     Minutes per session: Not on file     Stress: Not on file   Relationships     Social connections     Talks on phone: Not on file     Gets together: Not on file     Attends Restorationism service: Not on file     Active member of club or  organization: Not on file     Attends meetings of clubs or organizations: Not on file     Relationship status: Not on file     Intimate partner violence     Fear of current or ex partner: Not on file     Emotionally abused: Not on file     Physically abused: Not on file     Forced sexual activity: Not on file   Other Topics Concern     Parent/sibling w/ CABG, MI or angioplasty before 65F 55M? No   Social History Narrative    Cancer Warning Signs:  Patient states she has experienced none    Balanced Diet - Yes    Osteoporosis Prevention Measures - Dairy servings per day: 1-2    Regular Exercise -  No Describe     Dental Exam - YES - Date: 2009    Eye Exam - YES - Date: 2008    Self Breast Exam - Yes    Abuse: Current or Past (Physical, Sexual or Emotional)- No    Do you feel safe in your environment - Yes    Guns stored in the home - No    Sunscreen used - Yes    Seatbelts used - Yes    Lipids -  YES - Date: 6/2008    Glucose -  YES - Date: 6/2008    Colon Cancer Screening - No    Hemoccults - NO    Pap Test -  NO    Do you have any concerns about STD's -  No    Mammography - YES - Date: 7/2008    DEXA - YES - Date: 7/2008    Immunizations reviewed and up to date - Yes Last Td was 11/29/02    Updated 6/2001               General Physical examination:  There were no vitals taken for this visit.     General: Ms. Garza is well appearing and is appropriately groomed and dressed.    Neurological examination:    Mental status: Ms. Garza  is awake, alert,  She is not oriented to time (does not know the date, day, month or year).  She thinks it's 1990.  She knows that she is at home in her living room, in [place], [place]  Cranial nerves: EOMI, no facial asymmetry  Motor examination:  No pronator drift, finger tapping intact.  Coordination: Finger-nose-finger intact      Labs:  Lab Results   Component Value Date    WBC 6.5 07/21/2020    RBC 3.76 (L) 07/21/2020    HGB 10.8 (L) 07/21/2020    HCT 32.5 (L) 07/21/2020     MCV 86 07/21/2020    MCH 28.7 07/21/2020    MCHC 33.2 07/21/2020    RDW 13.7 07/21/2020     07/21/2020     07/21/2020    POTASSIUM 3.5 07/21/2020    CHLORIDE 102 07/21/2020    CO2 26 07/21/2020    ANIONGAP 7 07/21/2020    GLC 85 07/21/2020    BUN 20 07/21/2020    CR 0.67 07/21/2020    YURI 9.1 07/21/2020    TSH 2.29 10/11/2017    B12 467 05/26/2015        Imaging:  Results for orders placed or performed during the hospital encounter of 07/16/20   Head CT w/o contrast    Narrative    CT SCAN OF THE HEAD WITHOUT CONTRAST   7/16/2020 3:18 PM     HISTORY: Fall, confusion.    TECHNIQUE: Axial images of the head and coronal reformations without  IV contrast material. Radiation dose for this scan was reduced using  automated exposure control, adjustment of the mA and/or kV according  to patient size, or iterative reconstruction technique.    COMPARISON: Head MRI 7/12/2018, head CT 5/26/2015    FINDINGS: Moderate volume loss is present. White matter  hypoattenuation likely represents moderate chronic small vessel  ischemic change. The cerebral hemispheres, brainstem, and cerebellum  otherwise demonstrate normal morphology and attenuation. No evidence  of acute ischemia, hemorrhage, mass, mass effect or hydrocephalus. The  visualized calvarium, tympanic cavities, mastoid cavities, and  paranasal sinuses are unremarkable.      Impression    IMPRESSION: No acute intracranial abnormality.    DARELL HUYNH MD   XR Pelvis w Hip Left 1 View    Narrative    XR PELVIS AND LEFT HIP ONE VIEW   7/16/2020 3:56 PM     HISTORY: Fall, left hip pain.    COMPARISON: 3/24/2020 x-ray.      Impression    IMPRESSION: Advanced right hip degenerative changes with complete loss  of joint space. This is unchanged.    Left hip joint space is fairly well-preserved with some early  acetabular marginal osteophyte formation. No evidence of acute  fracture.    CORINNE CARRERO MD           Impression:  Ms. Garza is a 89 year old female  "with history of DM, stroke, hyperlipidemia, who presents with progressive decline of memory, now with dependent on iADLs, has hallucinations and sundowning.  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  Patient's CT scan shows diffuse cortical atrophy, supportive of AD.       Recommendations:  1. Start donepezil 5mg x 2 months, 10mg after that daily.    2.  Return in 3 - 4 months.    I spent a total of 40 minutes face-to-face with the patient, and over half of that time was spent counseling regarding the symptoms, diagnosis, medication risks, lifestyle modification, therapeutic options, and prognosis.    Left message that I will call  to appointment time to go over review questions.     Reanna Garza is a 89 year old female who is being evaluated via a billable video visit.      The patient has been notified of following:     \"This video visit will be conducted via a call between you and your physician/provider. We have found that certain health care needs can be provided without the need for an in-person physical exam.  This service lets us provide the care you need with a video conversation.  If a prescription is necessary we can send it directly to your pharmacy.  If lab work is needed we can place an order for that and you can then stop by our lab to have the test done at a later time.    Video visits are billed at different rates depending on your insurance coverage.  Please reach out to your insurance provider with any questions.    If during the course of the call the physician/provider feels a video visit is not appropriate, you will not be charged for this service.\"    Patient has given verbal consent for Video visit? Yes  How would you like to obtain your AVS? Mail a copy  If you are dropped from the video visit, the video invite should be resent to: Text to cell phone: [phone]  Will anyone else be joining your video visit? Yes: daughter . How would they like to " receive their invitation? Text to cell phone: [phone]        Video-Visit Details    Type of service:  Video Visit    Video Start Time: 3:00PM  Video End Time: 3:40PM    Originating Location (pt. Location): Home    Distant Location (provider location):  [facility] NEUROSPECIALTIES     Platform used for Video Visit: Shira Curry MD

## 2020-08-28 NOTE — PROGRESS NOTES
Chief Complaint: memory problem     History of Present Illness:  Ms. Garza is a 89 year old  female with history of DM,  presenting for evaluation of memory problems. She is accompanied to today's visit by daughter Belle,  Krupa, who assists in providing the history.    Her daughter reports that Ms. Garza started having memory problems since the stroke 5 years ago.  She had weakness on the left side.  Memory problem progressed after the stroke.  Memory is a consistent problem. Recently, she has pattie showing symptoms of sundowning, which include becoming more confused, her legs give out on her, talking to people who are not there, and she is confused about her family members.    She has developed hallucination about 2-3 years ago.   When her blood sugar is high, her memory is worse.  She says that her house is someone else's house, but looks like her house.  She doesn't know where the bathroom is.   She is verbally aggressive at times.      She can go to the bathroom by herself.     Sometimes she can't get back to sleep once every 1-2 weeks.      She is not taking donepezil and memantine.      IADL: She cleaned the kitchen.  Daughter is doing all of the cooking since about 2-3 years.      She lives with her daughter and son-in-law.     Patient has PT twice a week, since July 28.      Medical review of systems: The comprehensive review of systems is otherwise reviewed and negative.       Patient Active Problem List   Diagnosis     Abdominal aortic aneurysm (H)     Restless legs syndrome (RLS)     Abnormality of gait     Imbalance     HYPERLIPIDEMIA LDL GOAL <100     Low back pain     Lumbar spondylosis     Right hip pain     Trochanteric bursitis     Hypertension goal BP (blood pressure) < 140/90     Shoulder pain     Disturbance of skin sensation     Macular degeneration (senile) of retina     Chronic rhinitis     Stroke (H)     Vitamin D deficiency     Advanced care planning/counseling discussion      Type 2 diabetes mellitus without complication (H)     Confusion     History of TIA (transient ischemic attack) and stroke     Vascular dementia without behavioral disturbance (H)     Hypokalemia     Hyponatremia       Past Medical History:   Diagnosis Date     Abdominal aneurysm without mention of rupture      Arthritis      CVA (cerebral infarction)      DJD (degenerative joint disease)      Hyperlipidemia LDL goal <130      Hypertension goal BP (blood pressure) < 140/90      Low back pain      Right hip pain      Stroke (H)      Type 2 diabetes, HbA1C goal < 8% (H)      She had normal childhood development and reports no major head injuries.     Past Surgical History:   Procedure Laterality Date     BLEPHAROPLASTY, BROW LIFT, COMBINED  6/10/2013    Procedure: COMBINED BLEPHAROPLASTY, BROW LIFT;  BILATERAL BLEPHAROPLASTY WITH INTERNAL BROW LIFT;  Surgeon: [place], Chip HERNANDEZ MD;  Location:  [facility]     C CHOLECYSTOENTEROSTOMY      thaddeus     C TOTAL ABDOM HYSTERECTOMY       C TOTAL KNEE ARTHROPLASTY  6/04    x2     EXTRACAPSULAR CATARACT EXTRATION WITH INTRAOCULAR LENS IMPLANT      both eyes     HC SHOULDER ARTHROSCOPY, DX  12/05    rotator cuff tear repair     REPLACEMENT SOCKET, SHOULDER DISARTICULATION/INTERSCAPULAR THORACIC, MOLDED TO  6/07    right       Current Outpatient Medications   Medication Sig Dispense Refill     acetaminophen 650 MG TABS Take 650 mg by mouth every 4 hours as needed for mild pain or fever 100 tablet      aspirin 81 MG EC tablet Take 81 mg by mouth 2 times daily       calcium citrate-vitamin D (CITRACAL) 315-200 MG-UNIT TABS per tablet Take 1 tablet by mouth daily       Ibuprofen (ADVIL PO) Take 200 mg by mouth every 6 hours as needed        losartan (COZAAR) 50 MG tablet Take 50 mg by mouth At Bedtime       lovastatin (MEVACOR) 40 MG tablet TAKE 1 TABLET BY MOUTH EVERYDAY AT BEDTIME 90 tablet 3     melatonin 3 MG tablet Take 6 mg by mouth At Bedtime       multivitamin (CENTRUM SILVER) tablet  Take 1 tablet by mouth daily       multivitamin (OCUVITE) TABS tablet Take 1 tablet by mouth daily       vitamin D3 (CHOLECALCIFEROL) 50 mcg (2000 units) tablet Take 1 tablet by mouth 2 times daily          Allergies   Allergen Reactions     Codeine      Hives       Codeine Sulfate Hives     Penicillins Hives     [place] Hives       Family History   Problem Relation Age of Onset     Cancer Brother      Cancer Sister      Breast Cancer Sister      Cancer Sister      Cancer Sister      Cancer Sister      Alzheimer Disease Sister        There is otherwise no family history of neurodegenerative disease.     Social History     Socioeconomic History     Marital status:      Spouse name: Not on file     Number of children: Not on file     Years of education: Not on file     Highest education level: Not on file   Occupational History     Not on file   Social Needs     Financial resource strain: Not on file     Food insecurity     Worry: Not on file     Inability: Not on file     Transportation needs     Medical: Not on file     Non-medical: Not on file   Tobacco Use     Smoking status: Former Smoker     Last attempt to quit: 1999     Years since quittin.8     Smokeless tobacco: Never Used   Substance and Sexual Activity     Alcohol use: Yes     Comment: rare     Drug use: No     Sexual activity: Yes     Partners: Male   Lifestyle     Physical activity     Days per week: Not on file     Minutes per session: Not on file     Stress: Not on file   Relationships     Social connections     Talks on phone: Not on file     Gets together: Not on file     Attends Scientologist service: Not on file     Active member of club or organization: Not on file     Attends meetings of clubs or organizations: Not on file     Relationship status: Not on file     Intimate partner violence     Fear of current or ex partner: Not on file     Emotionally abused: Not on file     Physically abused: Not on file     Forced sexual  activity: Not on file   Other Topics Concern     Parent/sibling w/ CABG, MI or angioplasty before 65F 55M? No   Social History Narrative    Cancer Warning Signs:  Patient states she has experienced none    Balanced Diet - Yes    Osteoporosis Prevention Measures - Dairy servings per day: 1-2    Regular Exercise -  No Describe     Dental Exam - YES - Date: 2009    Eye Exam - YES - Date: 2008    Self Breast Exam - Yes    Abuse: Current or Past (Physical, Sexual or Emotional)- No    Do you feel safe in your environment - Yes    Guns stored in the home - No    Sunscreen used - Yes    Seatbelts used - Yes    Lipids -  YES - Date: 6/2008    Glucose -  YES - Date: 6/2008    Colon Cancer Screening - No    Hemoccults - NO    Pap Test -  NO    Do you have any concerns about STD's -  No    Mammography - YES - Date: 7/2008    DEXA - YES - Date: 7/2008    Immunizations reviewed and up to date - Yes Last Td was 11/29/02    Updated 6/2001               General Physical examination:  There were no vitals taken for this visit.     General: Ms. Garza is well appearing and is appropriately groomed and dressed.    Neurological examination:    Mental status: Ms. Garza  is awake, alert,  She is not oriented to time (does not know the date, day, month or year).  She thinks it's 1990.  She knows that she is at home in her living room, in [place], [place]  Cranial nerves: EOMI, no facial asymmetry  Motor examination:  No pronator drift, finger tapping intact.  Coordination: Finger-nose-finger intact      Labs:  Lab Results   Component Value Date    WBC 6.5 07/21/2020    RBC 3.76 (L) 07/21/2020    HGB 10.8 (L) 07/21/2020    HCT 32.5 (L) 07/21/2020    MCV 86 07/21/2020    MCH 28.7 07/21/2020    MCHC 33.2 07/21/2020    RDW 13.7 07/21/2020     07/21/2020     07/21/2020    POTASSIUM 3.5 07/21/2020    CHLORIDE 102 07/21/2020    CO2 26 07/21/2020    ANIONGAP 7 07/21/2020    GLC 85 07/21/2020    BUN 20 07/21/2020    CR 0.67  07/21/2020    YURI 9.1 07/21/2020    TSH 2.29 10/11/2017    B12 467 05/26/2015        Imaging:  Results for orders placed or performed during the hospital encounter of 07/16/20   Head CT w/o contrast    Narrative    CT SCAN OF THE HEAD WITHOUT CONTRAST   7/16/2020 3:18 PM     HISTORY: Fall, confusion.    TECHNIQUE: Axial images of the head and coronal reformations without  IV contrast material. Radiation dose for this scan was reduced using  automated exposure control, adjustment of the mA and/or kV according  to patient size, or iterative reconstruction technique.    COMPARISON: Head MRI 7/12/2018, head CT 5/26/2015    FINDINGS: Moderate volume loss is present. White matter  hypoattenuation likely represents moderate chronic small vessel  ischemic change. The cerebral hemispheres, brainstem, and cerebellum  otherwise demonstrate normal morphology and attenuation. No evidence  of acute ischemia, hemorrhage, mass, mass effect or hydrocephalus. The  visualized calvarium, tympanic cavities, mastoid cavities, and  paranasal sinuses are unremarkable.      Impression    IMPRESSION: No acute intracranial abnormality.    DARELL HUYNH MD   XR Pelvis w Hip Left 1 View    Narrative    XR PELVIS AND LEFT HIP ONE VIEW   7/16/2020 3:56 PM     HISTORY: Fall, left hip pain.    COMPARISON: 3/24/2020 x-ray.      Impression    IMPRESSION: Advanced right hip degenerative changes with complete loss  of joint space. This is unchanged.    Left hip joint space is fairly well-preserved with some early  acetabular marginal osteophyte formation. No evidence of acute  fracture.    CORINNE CARRERO MD           Impression:  Ms. Garza is a 89 year old female with history of DM, stroke, hyperlipidemia, who presents with progressive decline of memory, now with dependent on iADLs, has hallucinations and sundowning.  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  Patient's CT scan shows diffuse cortical atrophy, supportive  of AD.       Recommendations:  1. Start donepezil 5mg x 2 months, 10mg after that daily.    2.  Return in 3 - 4 months.    I spent a total of 40 minutes face-to-face with the patient, and over half of that time was spent counseling regarding the symptoms, diagnosis, medication risks, lifestyle modification, therapeutic options, and prognosis.

## 2020-08-28 NOTE — PROGRESS NOTES
"Reanna Garza is a 89 year old female who is being evaluated via a billable video visit.      The patient has been notified of following:     \"This video visit will be conducted via a call between you and your physician/provider. We have found that certain health care needs can be provided without the need for an in-person physical exam.  This service lets us provide the care you need with a video conversation.  If a prescription is necessary we can send it directly to your pharmacy.  If lab work is needed we can place an order for that and you can then stop by our lab to have the test done at a later time.    Video visits are billed at different rates depending on your insurance coverage.  Please reach out to your insurance provider with any questions.    If during the course of the call the physician/provider feels a video visit is not appropriate, you will not be charged for this service.\"    Patient has given verbal consent for Video visit? Yes  How would you like to obtain your AVS? Mail a copy  If you are dropped from the video visit, the video invite should be resent to: Text to cell phone: [phone]  Will anyone else be joining your video visit? Yes: daughter . How would they like to receive their invitation? Text to cell phone: [phone]        Video-Visit Details    Type of service:  Video Visit    Video Start Time: 3:00PM  Video End Time: 3:40PM    Originating Location (pt. Location): Home    Distant Location (provider location):  [facility] NEUROSPECIALTIES     Platform used for Video Visit: Shira Curry MD      "

## 2020-08-29 NOTE — PATIENT INSTRUCTIONS
1. Start donepezil 5mg x 2 months, 10mg after that daily.    2. If develop severe side effects, please call office.  3. Return in 3 - 4 months.

## 2020-08-29 NOTE — PROGRESS NOTES
Rx was not sent to her pharmacy so sent the Rx for donepezil.  Brenton daniel MD  August 28, 2020

## 2020-08-31 NOTE — TELEPHONE ENCOUNTER
Livier mcdaniels PT called from [facility] to report patient fell on Friday & Saturday and the fire dept had to come help

## 2020-09-09 NOTE — TELEPHONE ENCOUNTER
I called Livier and approved home care;     PT:      1 visit x 1 week effective 9-19-20     HHA:   1 visit x 2 weeks effective 9-12-20    Lenora Solitario RN

## 2020-09-09 NOTE — TELEPHONE ENCOUNTER
Reason for Call:  Home Health Care    Mildred with [facility] called regarding (reason for call): Verbal Orders    Orders are needed for this patient. PT, HHA    PT:  1 visit x 1 week effective 9-19-20    HHA: 1 visit x 2 weeks effective 9-12-20      Pt Provider: Dr. Curtis    Phone Number Homecare Nurse can be reached at: [phone]    Can we leave a detailed message on this number? YES    Phone number patient can be reached at: Home number on file [phone] (home)    Best Time: asap    Call taken on 9/9/2020 at 1:57 PM by Kierra Alberts

## 2020-09-22 NOTE — TELEPHONE ENCOUNTER
Livier, PT with [facility], is calling stating that needs verbal orders discharge PT, OT and HHA from home care as goals have not been met. Please call Livier to discuss. Thank You    Bambi Minor on 9/22/2020 at 11:06 AM

## 2020-10-01 PROBLEM — N39.0 URINARY TRACT INFECTION WITHOUT HEMATURIA, SITE UNSPECIFIED: Status: ACTIVE | Noted: 2020-01-01

## 2020-10-01 PROBLEM — M25.551 HIP PAIN, RIGHT: Status: ACTIVE | Noted: 2020-01-01
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
## 2020-10-01 NOTE — ED TRIAGE NOTES
Pt lives with daughter. Pt fell at home going to the bathroom telling EMS the walker slipped and pt fell forward. No LOC. C/o right hip pain. EMS states pt able to stand and bear weight. Pt has dementia and is able to answer some questions appropriately. Per EMS, daughter states pt has increasing falls with decreasing mobility over past 2 weeks. Daughter is Belle [phone].

## 2020-10-01 NOTE — ED NOTES
Assisted patient to bedside commode. Patient was able to take a few steps with moderate help. RN notified, and urine sample sent.

## 2020-10-01 NOTE — PROGRESS NOTES
RECEIVING UNIT ED HANDOFF REVIEW    ED Nurse Handoff Report was reviewed by: Frida Thompson RN on October 1, 2020 at 4:49 PM

## 2020-10-01 NOTE — H&P
[facility]    History and Physical - Hospitalist Service       Date of Admission:  10/1/2020    Assessment & Plan   Reanna Garza is a 89 year old female with PMH significant for CVA, hypertension, hyperlipidemia, non-insulin-dependent type 2 diabetes, DJD, abdominal aneurysm without rupture who was registered to observation on 10/1/2020 with complaints of right hip pain after mechanical fall and new finding of acute cystitis.     Asymptomatic COVID-19 admission screen: Swabbed in the emergency department. No known COVID-19 exposures. Negative 7/21, 7/16.    *Medications not reconciled on admission, will update meds and reorder as appropriate once reconciled.*    Right hip pain s/p mechanical fall  Recent frequent falls x2 weeks PTA.  Slipped while ambulating with walker to bathroom resulting in fall at right hip.  Immediate hip pain though able to bear weight and ambulate. Basic labs unremarkable. VSS. Afebrile. Rt Hip XR Neg for acute pathology or fracture. Per notes, PCP was attempting to set up HHA, PT/OT.  - Observation status  - Pain control: APAP scheduled, PRN ice to Rt hip, judicious use of opioids given advanced age and history of dementia  - Baseline ambulation with walker at home with daughter  - PT/OT/SW/CC, may need TCU on discharge vs home with home care  - No CT necessary given patient has been bearing weight and able to ambulate since her fall.  If she has more difficulty or is unable to walk would reconsider CT scan.  - D/w daughter Belle, who provided detailed information. Full code, considering changing status to DNR/DNI but will d/w family and decide. Seen info about dementia below.    Acute cystitis  Frequent falls as above and malodorous urine per daughter. No fevers, chills, or flank pain.  UA: +Nitrite, +LE, WBC 84.  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 Saw Neurologist 8/28 - pt  with memory problems, worse with hyperglycemia. Stroke x5 yrs ago with left sided weakness. Memory problem progressed after stroke. Sundowning at home, more confused, legs give out at times, talking to people not there, and confused about family members. Hallucinations for the past 2-3 years. Verbally aggressive at times. Required 24 hour care at home, patient typically able to do her own ADLs.  Following with Dr. Ashley Curry of Neurology, last visit 8/28/20.  On admit: year 1989 then 1999, unsure who president is and very surprised when I tell her Emery Bingham, month January or February, or season Fall.   Per daughter, after ~4pm she has sundowning.   - Continue PTA donepezil dosing - started 5mg x2 months 8/28/20 --> uptitrate to 10mg after 2 months per neuro note  - Rehab and  consults as above  - Reorient frequently  - Minimize use of opioids  - Maintain adequate sleep-wake cycle  - Delirium precautions  - Tends to call out for Belle (daughter) and Dad (her ), needs to be reassured that she will see them soon and reoriented. More irritated in the evening. Will add very low dose Seroquel PRN and scheduled melatonin for night.    Insulin-dependent type 2 diabetes, well controlled  PTA Regimen: Glipizide 5 mg BID  Last HbA1c: 5.3% 5/2019  - Hold PTA oral anti-glycemic's while in observation status, could consider discontinuing this given advanced age for prevention of hypoglycemia  - Hypoglycemia protocol, preprandial and HS fingerstick checks  - Sliding scale insulin Low  - Long acting regimen: None    Recent Labs   Lab 10/01/20  0920   *       Hypertension  - Continue PTA losartan 50 mg nightly with hold parameters.    - PRN hydralazine for SBP greater than 180.    Other chronic stable diagnoses: Medications will be resumed as appropriate and nonessential medications held for observation status.  Restless leg syndrome  Abdominal aneurysm  Macular degeneration  Vitamin D  deficiency  Lumbar spondylolysis  Hyperlipidemia  History of CVA with left sided weakness     Diet:  Regular, no restrictions given advanced age and dementia  DVT Prophylaxis: Pneumatic Compression Devices and Ambulate every shift  Abarca Catheter: not present  Code Status:  Full - c/w prior status, unable to reach daughter. Patient not appropriate for code discussion given dementia.  ADDENDUM: d/w daughter Belle, Full code. Discussing with family about potentially changing to DNR/DNI given age and dementia.    Disposition Plan   Expected discharge: 1-2 days, recommended to TCU vs home with home care once adequate pain management/ tolerating PO medications and safe disposition plan/ TCU bed available.  Entered: Cassy Amaya PA-C 10/01/2020, 12:08 PM     The patient's care was discussed with the Attending Physician, Dr. Fragoso, Patient and ER Physician Dr. Whitlock\.    Cassy Amaya ([place])YG  Hospitalist FRACISCO  [facility]    ______________________________________________________________________    Chief Complaint   Fall, hip pain    History is obtained from the patient, electronic health record, emergency department physician and patient's daughter    History of Present Illness   Reanna Garza is a 89 year old female with PMH significant for CVA, hypertension, hyperlipidemia, non-insulin-dependent type 2 diabetes, DJD, abdominal aneurysm without rupture who presented to the emergency department with complaints of right hip pain after mechanical fall.  The patient has had frequent falls over the past couple of weeks.  Her daughter has noticed malodorous urine.  Today the patient was ambulating with her usual walker when she slipped on a rug while ambulating from bedroom to bathroom causing the patient to fall forward and landed on her right hip.  No loss of consciousness.  Denies striking of head.  Complained of right hip pain although able to stand and bear weight.  No  headache, neck pain, or back pain.  No chest pain, palpitations, shortness of breath, lightheadedness, or dizziness.  No nausea, vomiting, abdominal pain, or flank pain.  No changes to bowels.  No new cough, sore throat, or other respiratory difficulties.  No fevers or chills.  The patient does live with her daughter prior to admission who helps care for her.  No blood thinners prior to admission though does take a daily aspirin 81 mg.    In the ER, patient was hemodynamically stable and afebrile.  Basic labs unremarkable.  X-ray of the right hip without acute pathology or fracture.  Given the patient is able to stand and bear weight and utilize bedside commode do not feel a CT scan would be necessary in this case.  UA is infectious appearing with positive nitrites, large leukocyte esterase, and 84 white blood cells.  Urine culture obtained and pending.  EKG rate 56 bpm sinus bradycardia, LAD.  No significant change from prior study.  She is not septic on admission.  Blood cultures were not drawn.  She will receive 1 dose of Rocephin in the ER and be swabbed for asymptomatic COVID-19 admission screen.    Review of Systems    The 10 point Review of Systems is negative other than noted in the HPI or here. Limited by dementia.    Past Medical History    I have reviewed this patient's medical history and updated it with pertinent information if needed.   Past Medical History:   Diagnosis Date     Abdominal aneurysm without mention of rupture      Arthritis      CVA (cerebral infarction)      DJD (degenerative joint disease)      Hyperlipidemia LDL goal <130      Hypertension goal BP (blood pressure) < 140/90      Low back pain      Right hip pain      Stroke (H)      Type 2 diabetes, HbA1C goal < 8% (H)        Past Surgical History   I have reviewed this patient's surgical history and updated it with pertinent information if needed.  Past Surgical History:   Procedure Laterality Date     BLEPHAROPLASTY, BROW LIFT,  COMBINED  6/10/2013    Procedure: COMBINED BLEPHAROPLASTY, BROW LIFT;  BILATERAL BLEPHAROPLASTY WITH INTERNAL BROW LIFT;  Surgeon: Suhas, Chip HERNANDEZ MD;  Location:  [facility]     C CHOLECYSTOENTEROSTOMY      thaddeus     C TOTAL ABDOM HYSTERECTOMY       C TOTAL KNEE ARTHROPLASTY  6/04    x2     EXTRACAPSULAR CATARACT EXTRATION WITH INTRAOCULAR LENS IMPLANT      both eyes     HC SHOULDER ARTHROSCOPY, DX      rotator cuff tear repair     REPLACEMENT SOCKET, SHOULDER DISARTICULATION/INTERSCAPULAR THORACIC, MOLDED TO      right       Social History   I have reviewed this patient's social history and updated it with pertinent information if needed.  Social History     Tobacco Use     Smoking status: Former Smoker     Quit date: 1999     Years since quittin.9     Smokeless tobacco: Never Used   Substance Use Topics     Alcohol use: Yes     Comment: rare     Drug use: No       Family History   I have reviewed this patient's family history and updated it with pertinent information if needed.   Family History   Problem Relation Age of Onset     Cancer Brother      Cancer Sister      Breast Cancer Sister      Cancer Sister      Cancer Sister      Cancer Sister      Alzheimer Disease Sister        Prior to Admission Medications   Prior to Admission Medications   Prescriptions Last Dose Informant Patient Reported? Taking?   acetaminophen (TYLENOL) 325 MG tablet prn Daughter Yes Yes   Sig: Take 650 mg by mouth every 4 hours as needed (hip pain)   aspirin 81 MG [facility] tablet 2020 at pm Daughter Yes Yes   Sig: Take 81 mg by mouth 2 times daily   calcium citrate-vitamin D (CITRACAL) 315-200 MG-UNIT TABS per tablet 2020 at pm Daughter Yes Yes   Sig: Take 1 tablet by mouth daily   donepezil (ARICEPT) 10 MG tablet  Pharmacy No No   Sig: Take 1 tablet (10 mg) by mouth At Bedtime   donepezil (ARICEPT) 5 MG tablet 2020 at am Pharmacy No Yes   Simg tab by mouth daily every morning for 2 months then increase to  10mg daily   glipiZIDE (GLUCOTROL) 5 MG tablet 9/30/2020 at pm Daughter Yes Yes   Sig: Take 2.5 mg by mouth 2 times daily (before meals)    ibuprofen (ADVIL/MOTRIN) 200 MG capsule prn Daughter Yes Yes   Sig: Take 200 mg by mouth every 6 hours as needed for fever   losartan (COZAAR) 50 MG tablet 9/30/2020 at pm Pharmacy Yes Yes   Sig: Take 50 mg by mouth At Bedtime   lovastatin (MEVACOR) 40 MG tablet 9/30/2020 at pm Pharmacy No Yes   Sig: TAKE 1 TABLET BY MOUTH EVERYDAY AT BEDTIME   melatonin 3 MG tablet 9/30/2020 at hs Daughter Yes Yes   Sig: Take 6 mg by mouth At Bedtime   multivitamin (CENTRUM SILVER) tablet 9/30/2020 at am Daughter Yes Yes   Sig: Take 1 tablet by mouth daily   multivitamin (OCUVITE) TABS tablet 9/30/2020 at am Daughter Yes Yes   Sig: Take 1 tablet by mouth daily   vitamin D3 (CHOLECALCIFEROL) 50 mcg (2000 units) tablet 9/30/2020 at pm Daughter Yes Yes   Sig: Take 1 tablet by mouth 2 times daily      Facility-Administered Medications: None     Allergies   Allergies   Allergen Reactions     Codeine      Hives       Codeine Sulfate Hives     Penicillins Hives       Physical Exam   Vital Signs: Temp: 98.6  F (37  C) Temp src: Oral BP: (!) 151/72 Pulse: 62   Resp: 16 SpO2: 96 % O2 Device: None (Room air)    Weight: 160 lbs 0 oz    General: Awake, alert, elderly woman who appears stated age. Looks comfortable laying in bed. No acute distress.  HEENT: Normocephalic, atraumatic. Extraocular movements intact. Pupils equal round and reactive to light bilaterally. Oropharynx clear without exudates.   Respiratory: Clear to auscultation bilaterally, no rales, wheezing, or rhonchi.  Cardiovascular: Regular rate and rhythm, +S1 and S2, no murmur auscultated. No peripheral edema.   Gastrointestinal: Soft, non-tender, non-distended. Bowel sounds present.  Skin: Warm, dry. No obvious rashes or lesions on exposed skin. Dorsalis pedis pulses palpable bilaterally.  Musculoskeletal: No joint swelling, erythema or  tenderness. Moves all extremities equally.  Neurologic: AAO x2. Cranial nerves 2-12 grossly intact, normal strength and sensation. Thinks year 1989 or 1999. Unsure of president, surprised to hear it is Emery Bingham. Month Jan/Feb. Season fall.  Psychiatric: Appropriate mood and affect. No obvious anxiety or depression.    Data   Data reviewed today: I reviewed all medications, new labs and imaging results over the last 24 hours. I personally reviewed no images or EKG's today.    Recent Labs   Lab 10/01/20  0920   WBC 6.5   HGB 12.1   MCV 87         POTASSIUM 3.4   CHLORIDE 108   CO2 28   BUN 24   CR 0.73   ANIONGAP 4   YURI 8.6   *     Recent Results (from the past 24 hour(s))   XR Pelvis w Hip Right 1 View    Narrative    XR PELVIS AND HIP RIGHT 1 VIEW  10/1/2020 9:40 AM     HISTORY: Fall, right hip pain    COMPARISON: 7/16/2020      Impression    IMPRESSION: No acute fracture or malalignment. Severe right hip  degenerative changes with bone-on-bone articulation. Moderate left hip  degenerative changes. Moderate bilateral sacroiliac joint degenerative  changes. Severe degenerative changes of the lower lumbar spine.  Osteopenia.     HELENA PACHECO MD

## 2020-10-01 NOTE — PHARMACY-ADMISSION MEDICATION HISTORY
Admission medication history interview status for the 10/1/2020  admission is complete. See EPIC admission navigator for prior to admission medications     Medication history source reliability:Good    Actions taken by pharmacist (provider contacted, etc): verified medications with daughter and [facility] pharmacist     Additional medication history information not noted on PTA med list : last dose taken listed on outside med list and confirmed with [facility] pharmacist. Glipizide prescription written for 5mg BID per [facility] pharmacist but daughter confirmed that the patient takes half a tablet BID. Daughter stated that Hydrochlorothiazide 12.5mg oral was discontinued due to low sodium levels.    Medication reconciliation/reorder completed by provider prior to medication history? No    Time spent in this activity: 30 mins    Prior to Admission medications    Medication Sig Last Dose Taking? Auth Provider   acetaminophen (TYLENOL) 325 MG tablet Take 650 mg by mouth every 4 hours as needed (hip pain) prn Yes Unknown, Entered By History   aspirin 81 MG [facility] tablet Take 81 mg by mouth 2 times daily 9/30/2020 at pm Yes Unknown, Entered By History   calcium citrate-vitamin D (CITRACAL) 315-200 MG-UNIT TABS per tablet Take 1 tablet by mouth daily 9/30/2020 at pm Yes Unknown, Entered By History   donepezil (ARICEPT) 5 MG tablet 5mg tab by mouth daily every morning for 2 months then increase to 10mg daily 9/30/2020 at am Yes Brenton Pedraza MD   glipiZIDE (GLUCOTROL) 5 MG tablet Take 2.5 mg by mouth 2 times daily (before meals)  9/30/2020 at pm Yes Reported, Patient   ibuprofen (ADVIL/MOTRIN) 200 MG capsule Take 200 mg by mouth every 6 hours as needed for fever prn Yes Unknown, Entered By History   losartan (COZAAR) 50 MG tablet Take 50 mg by mouth At Bedtime 9/30/2020 at pm Yes Unknown, Entered By History   lovastatin (MEVACOR) 40 MG tablet TAKE 1 TABLET BY MOUTH EVERYDAY AT BEDTIME 9/30/2020 at pm Yes Waleska Cervantes APRN CNP    melatonin 3 MG tablet Take 6 mg by mouth At Bedtime 9/30/2020 at hs Yes Unknown, Entered By History   multivitamin (CENTRUM SILVER) tablet Take 1 tablet by mouth daily 9/30/2020 at am Yes Unknown, Entered By History   multivitamin (OCUVITE) TABS tablet Take 1 tablet by mouth daily 9/30/2020 at am Yes Unknown, Entered By History   vitamin D3 (CHOLECALCIFEROL) 50 mcg (2000 units) tablet Take 1 tablet by mouth 2 times daily 9/30/2020 at pm Yes Unknown, Entered By History   donepezil (ARICEPT) 10 MG tablet Take 1 tablet (10 mg) by mouth At Bedtime   Ashley Curry MD

## 2020-10-01 NOTE — ED NOTES
Patient boosted in bed, extra linen removed from under her, fresh gown placed, pillows under legs, placed in trendelenburg position. Lights dimmed, emotional support given

## 2020-10-01 NOTE — ED NOTES
Patient screaming for help, and is confused. She appears to be more forgetful than when she arrived. RN and MD notified.

## 2020-10-01 NOTE — ED NOTES
[facility]  ED Nurse Handoff Report    ED Chief complaint: Fall      ED Diagnosis:   Final diagnoses:   Urinary tract infection without hematuria, site unspecified   Fall, initial encounter   Hip pain, right       Code Status: Full Code    Allergies:   Allergies   Allergen Reactions     Codeine      Hives       Codeine Sulfate Hives     Penicillins Hives       Patient Story: Pt lives with daughter. Pt fell at home going to the bathroom telling EMS the walker slipped and pt fell forward. No LOC. C/o right hip pain. EMS states pt able to stand and bear weight. Pt has dementia and is able to answer some questions appropriately. Per EMS, daughter states pt has increasing falls with decreasing mobility over past 2 weeks. Daughter is Belle [phone].     Focused Assessment:  Pt mildly confused about some things. Pt able to bear weight on hip and take a couple steps with assistance. Frequent falls at home.    Treatments and/or interventions provided: antibiotics for UTI  Patient's response to treatments and/or interventions: na    To be done/followed up on inpatient unit:  na    Does this patient have any cognitive concerns?: Short term memory loss    Activity level - Baseline/Home:  Stand with Assist-uses a walker  Activity Level - Current:   Stand with Assist    Patient's Preferred language: English   Needed?: No    Isolation: None  Infection: Not Applicable  Bariatric?: No    Vital Signs:   Vitals:    10/01/20 1032 10/01/20 1033 10/01/20 1034 10/01/20 1035   BP: (!) 151/72      Pulse: 62      Resp:       Temp:       TempSrc:       SpO2:  95% 100% 96%   Weight:       Height:           Cardiac Rhythm:     Was the PSS-3 completed:   Yes  What interventions are required if any?               Family Comments: ER physician spoke with pt's daughter via phone to inform of admission  OBS brochure/video discussed/provided to patient/family: Yes              Name of person given brochure if not  patient: na              Relationship to patient: na    For the majority of the shift this patient's behavior was Green.   Behavioral interventions performed were na.    ED NURSE PHONE NUMBER: [phone]

## 2020-10-01 NOTE — ED NOTES
"Patient attempted to get up out of bed saying \"I am going home, get me out of here you people are mean\". I explained to the patient that she was going to be admitted to the hospital, and she did not remember that we have had that conversation multiple times. Patient tucked back into bed.   "

## 2020-10-01 NOTE — ED PROVIDER NOTES
History     Chief Complaint:  Fall      HPI   Reanna Garza is a 89 year old female with a history of dementia, stroke, DM, and HTNwho presents via EMS with hip pain after a fall.  The patient reports she slipped on a rug when ambulating from her bedroom to the bathroom, causing her walker to slip and she fell forward.  There was no loss of consciousness.  She complained of right hip pain although was able to stand and bear weight.  She denies any headache, neck pain, or back pain.  She also denies any chest pain, shortness of breath, abdominal pain, nausea, cough or fever.  She lives with her daughter who reported more frequent falls in the past 2 weeks.  The patient denies any diarrhea, black or bloody stools, or changes in urination.  She is on daily baby Aspirin but does not take any blood thinners.    Allergies:  Codeine - hives   Penicillins - hives      Medications:    Aricept  Glipizide   Losartan  Lovastatin   Aspirin   Melatonin     Past Medical History:    Cerebral infarction  Vascular dementia   Hypertension  Hyperlipidemia   Type 2 diabetes mellitus   Restless leg syndrome   Abdominal aneurysm   Macular degeneration  Vitamin D deficiency  Lumbar spondylosis    Past Surgical History:    Blepharoplasty, bilateral 6/10/13  Shoulder replacement, right 2007  Rotator cuff repair 2005  Total knee arthroplasty 2004  Cholecystectomy   Hysterectomy, total abdominal   Cataract removal    Family History:    Cancer - brother, sister   Breast cancer - sister   Alzheimer's disease - sister     Social History:  Presents to the ED alone.  Living Situation: lives with daughter   Tobacco Use: Former smoker, quit 1999.   Alcohol Use: Rare  PCP: Chelita Curtis   Marital Status:        Review of Systems   Constitutional: Negative for appetite change, chills and fever.   Respiratory: Negative for cough and shortness of breath.    Cardiovascular: Negative for chest pain.   Gastrointestinal: Negative for  "abdominal pain, blood in stool, diarrhea and vomiting.   Genitourinary: Negative for difficulty urinating and dysuria.   Musculoskeletal: Negative for back pain and neck pain.        Positive for hip pain.   Neurological: Negative for light-headedness and headaches.   All other systems reviewed and are negative.    Physical Exam   First Vitals:  BP: (!) 142/67  Pulse: 59  Temp: 98.6  F (37  C)  Resp: 16  Height: 162.6 cm (5' 4\")  Weight: 72.6 kg (160 lb)  SpO2: 97 %    Physical Exam  SKIN:  Warm, dry.  Atraumatic.  HEMATOLOGIC/IMMUNOLOGIC/LYMPHATIC:  No pallor.  No extremity edema.  HENT:  Moist oral mucosa.  No oral mucosal, lingual or dental trauma.  Painless passive range of motion of head and neck.  EYES:  Conjunctivae normal.  Normal extraocular motion.  Pupils equal round and reactive to light.  CARDIOVASCULAR:  Regular rate and rhythm.  No murmur.  RESPIRATORY:  No respiratory distress, breath sounds equal and normal.  GASTROINTESTINAL:  Soft, nontender abdomen with active bowel sounds.  No distention.  No palpable mass.  MUSCULOSKELETAL: Painless passive range of motion of the torso/spine/upper extremities/left lower extremity.  Passive range of motion of right lower extremity is well-tolerated but does produce right hip pain.  NEUROLOGIC:  Alert, conversant.  Oriented to self place and time.  No gross motor deficit.  No tactile sensory deficit.  Gait not tested.  PSYCHIATRIC:  Normal mood.    Emergency Department Course   ECG:  @ 0906  Indication: falls  Vent. Rate 56 bpm. NE interval 156 ms. QRS duration 86 ms. QT/QTc 438/422 ms. P-R-T axis 29 -35 -5.   Sinus bradycardia. Left axis deviation. Low voltage QRS. Inferior infarct, age undetermined. Cannot rule out anterior infarct, age undetermined. Abnormal ECG.  No significant change when compared to previous ECG from 5/26/15   Read @ 0914 by Dr. Whitlock.     Previous ECG from 5/26/15:  Vent. Rate 63 bpm. NE interval 138 ms. QRS duration 88 ms. QT/QTc 484/495 " ms. P-R-T axis 22 -14 45.   Sinus rhythm. Low voltage QRS. Nonspecific T-wave abnormality. Abnormal ECG.     Imaging:  Pelvis x-ray with right hip:  No acute fracture or malalignment. Severe right hip degenerative changes with bone-on-bone articulation. Moderate left hip degenerative changes. Moderate bilateral sacroiliac joint degenerative changes. Severe degenerative changes of the lower lumbar spine. Osteopenia.   Report per radiology.     Radiographic findings were communicated with the patient and family who voiced understanding of the findings.     Laboratory:  CBC: WNL (WBC 6.5, HGB 12.1, )   BMP: Glucose 106 (H), otherwise WNL (Creatinine 0.73)     UA: Slightly cloudy yellow urine, Blood trace, Protein 10, Nitrite positive, Leukocyte Esterase large, WBC/HPF 84 (H), RBC/HPF 12 (H), Bacteria moderate, Squamous Epithelial Cells/HPF 5 (H), Mucous present, otherwise WNL  Urine Culture: pending     Asymptomatic COVID19 Virus PCR, nasopharyngeal: pending      Interventions:  (1208) Ceftriaxone, 1 g, IV infusion      Emergency Department Course:  The patient arrived in the emergency department via [facility].   Nursing notes and vitals reviewed.  I performed an exam of the patient as documented above. GCS 15    A peripheral IV was established. Blood was drawn from the patient. This was sent for laboratory testing, findings above.       The patient was sent for a x-ray of the pelvis while in the emergency department, findings above.      EKG was done, interpretation as above.      Urine sample was obtained and sent for laboratory analysis, findings above.     (4005) Findings and plan explained to the patient's daughter, Belle, who consents to observation.     (5026) I discussed the patient with Dr. Yang Fragoso of the hospitalist service, who will place the patient in observation in a medical bed.      The patient's nose was swabbed and this sample was sent for COVID testing.    Impression & Plan       Medical Decision Making:  This patient suffered a fall from a standing height with resulting right hip pain and report of multiple falls in the past 2 weeks and generalized weakness.  After discussion with the patient's daughter, who helps take care of her, she also had noted foul odor of the urine recently as well.  Testing was unremarkable barring the urinalysis.  After further discussion with the patient's daughter, the patient will be admitted for further care.  Rocephin initiated for treatment of UTI.    Diagnosis:    ICD-10-CM    1. Urinary tract infection without hematuria, site unspecified  N39.0 Urine Culture Aerobic Bacterial     Urine Culture Aerobic Bacterial   2. Fall, initial encounter  W19.XXXA    3. Hip pain, right  M25.551        Disposition:  Observation       I, Malika Araizajuan, am serving as a scribe on 10/1/2020 at 9:06 AM to personally document services performed by Albin Whitlock based on my observations and the provider's statements to me.    Malika Rosenberg  10/1/2020   [facility] EMERGENCY DEPT       Albin Whitlock MD  10/01/20 5301

## 2020-10-01 NOTE — PLAN OF CARE
Admission    Patient arrives to room 618-2 via cart from ED.  Care plan note:   DATE & TIME: 10/1/20 3358-8339   Cognitive Concerns/ Orientation : A&O to self, pleasantly confused, needing frequent orientation.    BEHAVIOR & AGGRESSION TOOL COLOR: green  CIWA SCORE: NA   ABNL VS/O2: BP elevated 156/79 other VSS, O2 wnl RA   MOBILITY: UW1-2, yet to get OOB, per daughter pt uses walker at baseline, Lt sided weakness from hx of stroke 5 years ago.   PAIN MANAGMENT: denies pain at rest, on scheduled Tylenol x3 daily, C/o Rt hip pain with repositioning.   DIET: Regular diet   BOWEL/BLADDER: yet to void, continent per daughter  ABNL LAB/BG: UA abn, UCx pending. .   DRAIN/DEVICES: unable to get IV access, paged IV team and flying squad.   TELEMETRY RHYTHM: NA  SKIN: redness under abdominal fold , groin area Rt>Lt, skin tear around rectal area, blanchable redness coccyx. Ordered miconazole powder.   TESTS/PROCEDURES: none  D/C DAY/GOALS/PLACE: 1-2 days  OTHER IMPORTANT INFO: Observation status. on IV Rocephine q24h for Cystitis. PT/OT/CC following, may need TCU at discharge vs home with home care.   MD/RN ROUNDING SIGNED OFF D/E SHIFT: NA   COMMIT TO SIT DONE AND SIGNED OFF: Yes        Inpatient nursing criteria listed below were met:    PCD's Documented: Yes  Skin issues/needs documented :Yes  Isolation education started/completed Yes  Patient allergies verified with patient: Yes  Verified completion of [place] Risk Assessment Tool:  Yes  Verified completion of Guardianship screening tool: Yes  Fall Prevention: Care plan updated, Education given and documented Yes  Care Plan initiated: Yes  Home medications documented in belongings flowsheet: NA  Patient belongings documented in belongings flowsheet: Yes  Reminder note (belongings/ medications) placed in discharge instructions:Yes  Admission profile/ required documentation complete: Yes  Bedside Report Letter given and explained to patient Yes  Visitor Designated?  Yes  If patient is a 72 hour hold/Commitment are belongings removed from room and locked up? NA

## 2020-10-01 NOTE — ED NOTES
"Pt yelling out of room with the call light in her hand when writer walked into the room.  Writer attempted to reposition pt for comfort however pt pulling off her covers and not remembering that staff was just at her bedside with ice for her hip.  Pt yelling at writer.  \"I can't see you.\"  Writer opened curtain and turned up the lights  "

## 2020-10-01 NOTE — ED NOTES
"Patient pulled out her IV stating \"she is going home\". Patient's blood cleaned up on the floor, and wound cleaned up. Dressing applied.  "

## 2020-10-01 NOTE — ED NOTES
Bed: ED03  Expected date:   Expected time:   Means of arrival:   Comments:  [facility] - 89 F fall eta 0842

## 2020-10-02 NOTE — PROGRESS NOTES
Care Management Follow Up Note    Length of Stay (days) 0    Patient plan of care discussed at Interdisciplinary Rounds:   Expected Discharge Date: 10/03/20  Concerns to be Addressed:    TCU is recommended and daughter in agreement per PA .  Daughter reports patient as been to [facility].   She did not like [facility] and [facility] no longer has a TCU.  Referral made via DOD process to [facility].     Anticipated Discharge Disposition:  TCU  Anticipated Discharge Services:    Anticipated Discharge DME:      Plan: [place] [facility]  Patient's insurance will require pre-authorization prior to discharge to TCU.  Her insurance is [facility] Medicare  Advantage. This plan does on a  case by case basis waive the standard medicare qualifying hsopital stay.  The approving agency is [employer] which is open on Saturdays.    JOB Jiménez

## 2020-10-02 NOTE — PROGRESS NOTES
10/02/20 1038   Quick Adds   Type of Visit Initial PT Evaluation   Living Environment   People in home child(mann), adult   Living Environment Comments Pt lives with dtr    Self-Care   Usual Activity Tolerance moderate   Current Activity Tolerance poor   Equipment Currently Used at Home walker, rolling   Activity/Exercise/Self-Care Comment Pt uses FWW at baseline   Disability/Function   Fall history within last six months yes   Number of times patient has fallen within last six months 2   Change in Functional Status Since Onset of Current Illness/Injury yes   General Information   Onset of Illness/Injury or Date of Surgery 10/01/20   Referring Physician Cassy Amyaa PA-C   Pertinent History of Current Problem (include personal factors and/or comorbidities that impact the POC) Reanna Garza is a 89 year old female with a history of dementia, stroke, DM, and HTNwho presents via EMS with hip pain after a fall.  The patient reports she slipped on a rug when ambulating from her bedroom to the bathroom, causing her walker to slip and she fell forward.  There was no loss of consciousness.  She complained of right hip pain although was able to stand and bear weight.  She denies any headache, neck pain, or back pain.  She also denies any chest pain, shortness of breath, abdominal pain, nausea, cough or fever.  She lives with her daughter who reported more frequent falls in the past 2 weeks.  The patient denies any diarrhea, black or bloody stools, or changes in urination.  She is on daily baby Aspirin but does not take any blood thinners.   Existing Precautions/Restrictions fall   Cognition   Orientation Status (Cognition) person   Affect/Mental Status (Cognition) confused   Follows Commands (Cognition) follows one-step commands   Cognitive Status Comments Pt has baseline dementia, impaired STM, pleasantly confused   Pain Assessment   Patient Currently in Pain Yes, see Vital Sign flowsheet  (R hip pain )  "  Posture    Posture Forward head position   Range of Motion (ROM)   ROM Comment BLE WFL   Strength   Strength Comments Pt demonstrates gross functional weakness with OOB mobility    Bed Mobility   Comment (Bed Mobility) Supine > sit with mod A x 1   Transfers   Transfer Safety Comments STS with FWW and mod A x 1    Gait/Stairs (Locomotion)   Comment (Gait/Stairs) NT due to R hip pain    Balance   Balance Comments Fair static sitting balance with BUE support. Fair-poor standing dynamic balance   Clinical Impression   Criteria for Skilled Therapeutic Intervention yes, treatment indicated   PT Diagnosis (PT) impaired IND with functional mobility from baseline   Influenced by the following impairments weakness, pain, cognition   Functional limitations due to impairments impaired IND with mobility   Clinical Presentation Evolving/Changing   Clinical Presentation Rationale clinical judgement   Clinical Decision Making (Complexity) moderate complexity   Therapy Frequency (PT) 3x/week   Predicted Duration of Therapy Intervention (days/wks) 1 week   Planned Therapy Interventions (PT) balance training;bed mobility training;gait training;strengthening;transfer training   Risk & Benefits of therapy have been explained patient;evaluation/treatment results reviewed;care plan/treatment goals reviewed   PT Discharge Planning    PT Discharge Recommendation (DC Rec) Transitional Care Facility   PT Rationale for DC Rec Pt requires A x 2, unable to ambulate, R hip pain, fall risk   PT Brief overview of current status  Mod A x 1 bed mobility, STS with mod A x 1 and FWW. Pt unable to tolerate ambulation c/o R hip pain RN informed. Pt far below baseline for mobility at this time    [facility] AM-PAC  \"6 Clicks\" V.2 Basic Mobility Inpatient Short Form   1. Turning from your back to your side while in a flat bed without using bedrails? 3 - A Little   2. Moving from lying on your back to sitting on the side of a flat bed without " using bedrails? 3 - A Little   3. Moving to and from a bed to a chair (including a wheelchair)? 2 - A Lot   4. Standing up from a chair using your arms (e.g., wheelchair, or bedside chair)? 2 - A Lot   5. To walk in hospital room? 2 - A Lot   6. Climbing 3-5 steps with a railing? 1 - Total   Basic Mobility Raw Score (Score out of 24.Lower scores equate to lower levels of function) 13   Total Evaluation Time   Total Evaluation Time (Minutes) 15

## 2020-10-02 NOTE — PROGRESS NOTES
[facility]  Hospitalist Progress Note  Date of Service (when I saw the patient): 10/02/2020    Summary:  Reanna Garza is a 89 year old year old female who has a PMH significant for Stage 6 dementia, CVA, hypertension, hyperlipidemia, non-insulin-dependent type 2 diabetes, DJD, abdominal aneurysm without rupture. Pt was admitted to [facility] on 10/1/2020 with complaints of right hip pain after mechanical fall and new finding of acute cystitis.  The following problems were addressed during her hospitalization:    Assessment & Plan:  Right hip pain s/p mechanical fall  Recent frequent falls x2 weeks PTA.  Slipped while ambulating with walker to bathroom resulting in fall at right hip.  Immediate hip pain though able to bear weight and ambulate. Basic labs unremarkable. VSS. Afebrile. Rt Hip XR Neg for acute pathology or fracture. Per notes, PCP was attempting to set up HHA, PT/OT.  - Observation status  - Pain control: APAP scheduled, PRN ice to Rt hip, judicious use of opioids given advanced age and history of dementia  - Baseline ambulation with walker at home with daughter  - PT/OT/SW/CC consulted, PT recommending TCU  - No CT necessary given patient has been bearing weight and able to ambulate since her fall.  If she has more difficulty or is unable to walk would reconsider CT scan.  - D/w daughter Belle, who provided detailed information. Full code, considering changing status to DNR/DNI but will d/w family and decide at a later time. See info about dementia below.     Acute cystitis  Frequent falls as above and malodorous urine per daughter. No fevers, chills, or flank pain.UA: +Nitrite, +LE, WBC 84.  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 Saw Neurologist 8/28 - pt with memory problems, worse with hyperglycemia. Stroke x5 yrs ago  with left sided weakness. Memory problem progressed after stroke. Sundowning at home, more confused, legs give out at times, talking to people not there, and confused about family members. Hallucinations for the past 2-3 years. Verbally aggressive at times. Required 24 hour care at home, patient typically able to do her own ADLs.  Following with Dr. Ashley Curry of Neurology, last visit 8/28/20.  On admit: year 1989 then 1999, unsure who president is and very surprised when I tell her Emery Bingham, month January or February, or season Fall.   Per daughter, after ~4pm she has sundowning.   - Continue PTA donepezil dosing - started 5mg x2 months 8/28/20 --> uptitrate to 10mg after 2 months per neuro note  - Rehab and  consults as above  - Reorient frequently  - Minimize use of opioids  - Maintain adequate sleep-wake cycle  - Delirium precautions  - Tends to call out for Belle (daughter) and Dad (her ), needs to be reassured that she will see them soon and reoriented. More irritated in the evening. Will add very low dose Seroquel PRN and scheduled melatonin for night.     Insulin-dependent type 2 diabetes, well controlled  PTA Regimen: Glipizide 5 mg BID  Last HbA1c: 5.3% 5/2019  - Hold PTA oral anti-glycemic's while in observation status, could consider discontinuing this given advanced age for prevention of hypoglycemia  - Hypoglycemia protocol, preprandial and HS fingerstick checks  - Sliding scale insulin Low  - Long acting regimen: None         Recent Labs   Lab 10/01/20  0920   *         Hypertension  - Continue PTA losartan 50 mg nightly with hold parameters.    - PRN hydralazine for SBP greater than 180.     Other chronic stable diagnoses: Medications will be resumed as appropriate and nonessential medications held for observation status.  Restless leg syndrome  Abdominal aneurysm  Macular degeneration  Vitamin D deficiency  Lumbar spondylolysis  Hyperlipidemia  History of CVA with left  sided weakness       DVT Prophylaxis: Pneumatic Compression Devices and Ambulate every shift  Code Status: Full Code- Discussed with daughter 10/2 who confirmed status is full code, but will discuss with siblings and consider changing to DNR/DNI after family discussion  Disposition: Expected discharge in 1-3 days once TCU placement is found.    The patient's care was discussed with the Attending Physician, Dr. Minor, Care Coordinator/, patient's daughter Belle, and Patient.    Cinda KLINE-NAY      Interval History   Pt found resting in bed. Pt oriented to self, pleasant, interactive. Denies fevers, chills, nausea, or vomiting. Pt confirms pain in right hip, but denies other pain. No questions or concerns. Discussed pt with daughter Belle via phone- Belle was expecting pt to need TCU and is agreeable to placement. Belle said her mom likes [facility] and the [facility], but did not like [facility]. She plans to visit today from 3-6:30pm today.     -Data reviewed today: I reviewed all new labs and imaging results over the last 24 hours. I personally reviewed no images or EKG's today.    Physical Exam   Temp: 98.1  F (36.7  C) Temp src: Oral BP: 137/66 Pulse: 64   Resp: 16 SpO2: 96 % O2 Device: None (Room air)    Vitals:    10/01/20 0854 10/01/20 1718   Weight: 72.6 kg (160 lb) 70.6 kg (155 lb 10.3 oz)     Vital Signs with Ranges  Temp:  [98.1  F (36.7  C)-98.5  F (36.9  C)] 98.1  F (36.7  C)  Pulse:  [64-68] 64  Resp:  [16-18] 16  BP: (137-156)/(66-79) 137/66  SpO2:  [96 %-98 %] 96 %  I/O last 3 completed shifts:  In: 132.5 [P.O.:100; I.V.:32.5]  Out: -     Constitutional: alert to voice, oriented to self, no acute distress  Eyes: No scleral icterus or injection  ENT: Normocephalic, atraumatic  Respiratory: No secondary muscle use or labored breathing. Lungs clear to auscultation bilaterally without wheezes or rhonchi   Cardiovascular: RRR without murmur  GI: Abdomen soft, non-tender to palpation, non-distended.  Bowel  sounds active  MSK: able to move extremities on command without unexpected weakness, right hip mildly tender to palpation, no gross hematoma  Skin/Integumen: warm, dry, in tact without rash or hives  Psych: oriented to self only, pleasant, interactive    Medications     lactated ringers 50 mL/hr at 10/01/20 2121       acetaminophen  1,000 mg Oral TID     aspirin  81 mg Oral BID     cefTRIAXone  1 g Intravenous Q24H     donepezil  5 mg Oral Daily     insulin aspart  1-3 Units Subcutaneous TID AC     insulin aspart  1-3 Units Subcutaneous At Bedtime     losartan  50 mg Oral At Bedtime     melatonin  1 mg Oral At Bedtime     senna-docusate  1 tablet Oral BID    Or     senna-docusate  2 tablet Oral BID     sodium chloride (PF)  3 mL Intracatheter Q8H       Data   Recent Labs   Lab 10/02/20  0904 10/01/20  0920   WBC 5.8 6.5   HGB 11.5* 12.1   MCV 86 87    222    140   POTASSIUM 3.6 3.4   CHLORIDE 111* 108   CO2 29 28   BUN 21 24   CR 0.64 0.73   ANIONGAP 3 4   YURI 8.6 8.6   GLC 96 106*       No results found for this or any previous visit (from the past 24 hour(s)).

## 2020-10-02 NOTE — PLAN OF CARE
DATE & TIME: 10/2/20 7149-0448  Cognitive Concerns/ Orientation : A&O to self only. Pleasantly confused  BEHAVIOR & AGGRESSION TOOL COLOR: green  CIWA SCORE: NA    ABNL VS/O2: VSS on RA   MOBILITY: UW assist of strong 2 walker/gait belt, falls risk (walker at baseline), L sided weakness from hx stroke, got up to the chair x1.   PAIN MANAGMENT: Denies pain  DIET: Regular diet, fair appetite   BOWEL/BLADDER: Incontinent of urine   ABNL LAB/BG: BG 99, 112  DRAIN/DEVICES: PIV infusing LR 50 mL/hr  TELEMETRY RHYTHM: NA  SKIN: Groin & artie area redness blanchable, blanchable coccyx, skin tear around rectal area, applie miconazole powder, turn/repo q2h.   TESTS/PROCEDURES: none scheduled   D/C DAY/GOALS/PLACE: 1-2 days. Lives at home with daughter. PT rec'd TCU, pt's daughter in agreement, SW following for placement.   OTHER IMPORTANT INFO: Observation status. On IV Rocephin Q24h for Cystitis. PT/OT/CC following     Obs goals:  -diagnostic tests and consults completed and resulted : not met   -vital signs normal or at patient baseline : met   -tolerating oral intake to maintain hydration : met   -adequate pain control on oral analgesics : met   -tolerating oral antibiotics or has plans for home infusion setup : not met   -infection is improving : not met   -safe disposition plan has been identified : not met

## 2020-10-02 NOTE — PROGRESS NOTES
Obs goals:  -diagnostic tests and consults completed and resulted : not met   -vital signs normal or at patient baseline : met   -tolerating oral intake to maintain hydration : met   -adequate pain control on oral analgesics : met   -tolerating oral antibiotics or has plans for home infusion setup : not met   -infection is improving : not met   -safe disposition plan has been identified : not met

## 2020-10-02 NOTE — PLAN OF CARE
Admission    Care plan note:   DATE & TIME: 10/1/20 1930 to 2330  Cognitive Concerns/ Orientation : A&O to self, pleasantly confused, needing frequent orientation.    BEHAVIOR & AGGRESSION TOOL COLOR: green  CIWA SCORE: NA    ABNL VS/O2: BP elevated 154/75 other VSS, O2 wnl RA   MOBILITY: UW1-2, yet to get OOB, per daughter pt uses walker at baseline, Lt sided weakness from hx of stroke 5 years ago.   PAIN MANAGMENT: denies pain at rest, on scheduled Tylenol x3 daily,  denies Rt hip pain with repositioning.   DIET: Regular diet   BOWEL/BLADDER: yin continent of urine, continent per daughter  ABNL LAB/BG: UA abn, UCx pending.   DRAIN/DEVICES: IV access IVF inusing   TELEMETRY RHYTHM: NA  SKIN: redness under abdominal fold , groin area Rt>Lt, skin tear around rectal area, blanchable redness coccyx. miconazole powder. applied  TESTS/PROCEDURES: none  D/C DAY/GOALS/PLACE: 1-2 days  OTHER IMPORTANT INFO: Observation status. on IV Rocephine q24h for Cystitis. PT/OT/CC following, may need TCU at discharge vs home with home care.

## 2020-10-02 NOTE — PLAN OF CARE
OT: Orders received. Chart reviewed and discussed with care team.  OT not indicated as pt w/ likely discharge to TCU (recommended by PT) and adriana in agreement. Therapy needs being met by PT while IP. Further OT deferred to next level of care at TCU. Defer discharge recommendations to PT.  Will complete orders.

## 2020-10-02 NOTE — PLAN OF CARE
PT: Eval orders received.  Attempted to see pt in AM however pt eating breakfast upon initial attempt

## 2020-10-02 NOTE — PROVIDER NOTIFICATION
"Filing PT recommendation note per PA request, new CM role       10/02/20 1038   Signing Clinician's Name / Credentials   Signing clinician's name / credentials Svitlana Gay DPT   Quick Adds   Rehab Discipline PT   Therapeutic Activity   Minutes of Treatment 25 minutes   Symptoms Noted During/After Treatment Fatigue;Increased pain   Treatment Detail PT: Eval complete, treatment indicated. Pt pleasantly confused. Cooperative. Pt denies R hip pain when supine. Denies hip pain with R hip ROM and resisted extension. Supine > sittinng EOB mod A  x1 and cues for proper use of bed rail. Once seated cues for use of UE support to scoot towards EOB for proper BLE ft position for transfer training. STS x 3 with FWW and mod A x 1, pt braces BLE against bed, posterior lean, cues for improved wt shift. Pt able to correct. Attempted ambulation however pt having difficulty with tolerating WB through RLE to clear LLE from floor. Pt able to side step towards L with FWW and min A x 1. Rehab aide present for safety. Sit > supine mod A x 2. Total A x 2 boosting in bed   PT Discharge Planning    PT Discharge Recommendation (DC Rec) Transitional Care Facility   PT Rationale for DC Rec Pt requires A x 2, unable to ambulate, R hip pain, fall risk   PT Brief overview of current status  Mod A x 1 bed mobility, STS with mod A x 1 and FWW. Pt unable to tolerate ambulation c/o R hip pain RN informed. Pt far below baseline for mobility at this time    Additional Documentation   Rehab Comments Pt c/o R hip pain with OOB mobility RN informed; 3x/wk OBS status    PT Plan PT: Bed mobility, transfers, gait with WC follow   [facility] AM-PAC  \"6 Clicks\" V.2 Basic Mobility Inpatient Short Form   1. Turning from your back to your side while in a flat bed without using bedrails? 3 - A Little   2. Moving from lying on your back to sitting on the side of a flat bed without using bedrails? 3 - A Little   3. Moving to and from a bed to a chair " (including a wheelchair)? 2 - A Lot   4. Standing up from a chair using your arms (e.g., wheelchair, or bedside chair)? 2 - A Lot   5. To walk in hospital room? 2 - A Lot   6. Climbing 3-5 steps with a railing? 1 - Total   Basic Mobility Raw Score (Score out of 24.Lower scores equate to lower levels of function) 13   Total Session Time   Total Session Time (minutes) 40 minutes  (eval 15)

## 2020-10-02 NOTE — PLAN OF CARE
DATE & TIME: 10/1/20 0840-2416  Cognitive Concerns/ Orientation : A&O to self only. Pleasant  BEHAVIOR & AGGRESSION TOOL COLOR: green  CIWA SCORE: NA    ABNL VS/O2: VSS on RA   MOBILITY: A x1-2 (walker at baseline), did not get OOB this shift (slept throughout night). Independently turned over night, weight shift assistance PRN. L sided weakness from hx stroke.   PAIN MANAGMENT: Denies pain  DIET: Regular diet   BOWEL/BLADDER: Incontinent of urine   ABNL LAB/BG: BG 85  DRAIN/DEVICES: R arm PIV infusing NS@50mL/hr  TELEMETRY RHYTHM: NA  SKIN: Groin & artie area redness blanchable, blanchable coccyx, skin tear around rectal area  TESTS/PROCEDURES: none scheduled   D/C DAY/GOALS/PLACE: 1-2 days, pending improvement. Lives at home with daughter. TCU after discharge?  OTHER IMPORTANT INFO: Observation status. On IV abx Q24h for Cystitis. PT/OT/CC following    Obs goals:  -diagnostic tests and consults completed and resulted : not met   -vital signs normal or at patient baseline : met   -tolerating oral intake to maintain hydration : not met   -adequate pain control on oral analgesics : met   -tolerating oral antibiotics or has plans for home infusion setup : not met   -infection is improving : not met   -safe disposition plan has been identified

## 2020-10-03 NOTE — PROGRESS NOTES
[facility]    Medicine Progress Note - Hospitalist Service       Date of Admission:  10/1/2020  Assessment & Plan   Reanna Garza is a 89 year old year old female who has a PMH significant for Stage 6 dementia, CVA, HTN, HLD, non-insulin-dependent type 2 diabetes, DJD, abdominal aneurysm without rupture. Pt was admitted to [facility] on 10/1/2020 with complaints of right hip pain after mechanical fall and new finding of acute cystitis.     Right hip pain s/p mechanical fall, improved  Recent frequent falls x2 weeks PTA.  Slipped while ambulating with walker to bathroom resulting in fall at right hip.  Immediate hip pain though able to bear weight and ambulate. Basic labs unremarkable. VSS. Afebrile. Rt Hip XR Neg for acute pathology or fracture. Per notes, PCP was attempting to set up HHA, PT/OT.  - Observation status  - Pain control: APAP scheduled, PRN ice to Rt hip, judicious use of opioids given advanced age and history of dementia  - Right hip pain much improved 10/3  - Baseline ambulation with walker at home with daughter  - PT/OT/SW/CC consulted, PT recommending TCU, SW assisting, [facility] referral sent an pending  - No CT necessary given patient has been bearing weight and able to ambulate since her fall.  If she has more difficulty or is unable to walk would reconsider CT scan.  - D/w daughter Belle on admission, who provided detailed information. Full code, considering changing status to DNR/DNI but will d/w family and decide at a later time. See info about dementia below.     Acute cystitis  Frequent falls as above and malodorous urine per daughter. No fevers, chills, or flank pain.   UA: +Nitrite, +LE, WBC 84.  - F/u UCx  - IV Rocephin while UCx is pending and pt remains in hospital, do not need to wait for UCx to return to change to oral abx  - E-scribed 4 days of PO Cipro on discharge as she will have a total of 3 doses of ceftriaxone while hospitalized.  Apparently has Sulfa allergy so did not use Bactrim. Previous Ecoli resistant to cephalosporins it appears but sensitive to FQs. This could be adjusted pending UCx, not yet back 10/3.  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 Saw Neurologist 8/28 - pt with memory problems, worse with hyperglycemia. Stroke x5 yrs ago with left sided weakness. Memory problem progressed after stroke. Sundowning at home, more confused, legs give out at times, talking to people not there, and confused about family members. Hallucinations for the past 2-3 years. Verbally aggressive at times. Required 24 hour care at home, patient typically able to do her own ADLs.  Following with Dr. Ashley Curry of Neurology, last visit 8/28/20.  On admit: year 1989 then 1999, unsure who president is and very surprised when told her Emery Bingham, month January or February, or season Fall.   Per daughter, after ~4pm she has sundowning.   - Continue PTA donepezil dosing - started 5mg x2 months 8/28/20 --> uptitrate to 10mg after 2 months per neuro note  - Rehab and  consults as above  - Reorient frequently  - Minimize use of opioids  - Maintain adequate sleep-wake cycle  - Delirium precautions  - Tends to call out for Belle (daughter) and Dad (her ), needs to be reassured that she will see them soon and reoriented. More irritated in the evening. Will add very low dose Seroquel PRN and scheduled melatonin for night.     Insulin-dependent type 2 diabetes, well controlled  PTA Regimen: Glipizide 5 mg BID  Last HbA1c: 5.3% 5/2019 --> 5.4% 10/1/20  - Discontinue PTA glipizide now and on discharge given advanced age and very well controlled DM2, for prevention of hypoglycemia  - Hypoglycemia protocol, preprandial and HS fingerstick checks  - Sliding scale insulin Low  - Long acting regimen: None     Recent Labs   Lab 10/03/20  1358 10/03/20  0931 10/03/20  0142  10/02/20  2107 10/02/20  1607 10/02/20  1210 10/02/20  0904 10/01/20  0920 10/01/20  0920   GLC  --   --   --   --   --   --  96  --  106*   * 88 93 124* 147* 112*  --    < >  --     < > = values in this interval not displayed.        Hypertension  - Continue PTA losartan 50 mg nightly with hold parameters.    - PRN hydralazine for SBP greater than 180.     Other chronic stable diagnoses: Medications will be resumed as appropriate and nonessential medications held for observation status.  Restless leg syndrome  Abdominal aneurysm  Macular degeneration  Vitamin D deficiency  Lumbar spondylolysis  Hyperlipidemia  History of CVA with left sided weakness     Diet: Regular Diet Adult    DVT Prophylaxis: Pneumatic Compression Devices and Ambulate every shift  Abraca Catheter: not present  Code Status: Full Code      Disposition Plan   Expected discharge: today or tomorrow, recommended to transitional care unit once safe disposition plan/ TCU bed available.  Entered: Cassy Amaya PA-C 10/03/2020, 3:36 PM       The patient's care was discussed with the Attending Physician, Dr. Minor, Bedside Nurse and Patient.    Cassy ClementParra)YG  Hospitalist Service  [facility]    ______________________________________________________________________    Interval History   Seen and examined. Feeling well. No c/o hip pain. Would like to go home, agrees to go to rehab facility until he can go home. Taking PO. No N/V.     Data reviewed today: I reviewed all medications, new labs and imaging results over the last 24 hours. I personally reviewed no images or EKG's today.    Physical Exam   Vital Signs: Temp: 98.1  F (36.7  C) Temp src: Oral BP: (!) 171/80 Pulse: 60   Resp: 18 SpO2: 98 % O2 Device: None (Room air)    Weight: 155 lbs 10.32 oz    General: Awake, alert, elderly woman who appears stated age. Looks comfortable sitting up in bed. No acute distress.  HEENT: Normocephalic,  atraumatic. Extraocular movements intact.    Respiratory: Clear to auscultation bilaterally, no rales, wheezing, or rhonchi.  Cardiovascular: Regular rate and rhythm, +S1 and S2, no murmur auscultated. No peripheral edema.   Gastrointestinal: Soft, non-tender, non-distended. Bowel sounds present.  Skin: Warm, dry. No obvious rashes or lesions on exposed skin. Dorsalis pedis pulses palpable bilaterally.  Musculoskeletal: No joint swelling, erythema or tenderness. Moves all extremities equally.  Neurologic: AAO x2. Cranial nerves 2-12 grossly intact, normal strength and sensation.   Psychiatric: Appropriate mood and affect. No obvious anxiety or depression.    Data   Recent Labs   Lab 10/02/20  0904 10/01/20  0920   WBC 5.8 6.5   HGB 11.5* 12.1   MCV 86 87    222    140   POTASSIUM 3.6 3.4   CHLORIDE 111* 108   CO2 29 28   BUN 21 24   CR 0.64 0.73   ANIONGAP 3 4   YURI 8.6 8.6   GLC 96 106*     No results found for this or any previous visit (from the past 24 hour(s)).  Medications       acetaminophen  1,000 mg Oral TID     aspirin  81 mg Oral BID     cefTRIAXone  1 g Intravenous Q24H     donepezil  5 mg Oral Daily     insulin aspart  1-3 Units Subcutaneous TID AC     insulin aspart  1-3 Units Subcutaneous At Bedtime     losartan  50 mg Oral At Bedtime     melatonin  1 mg Oral At Bedtime     senna-docusate  1 tablet Oral BID    Or     senna-docusate  2 tablet Oral BID     sodium chloride (PF)  3 mL Intracatheter Q8H

## 2020-10-03 NOTE — PLAN OF CARE
DATE & TIME: 10/2/20   Cognitive Concerns/ Orientation : A&O to self only. Increasingy confused during the shift. PRN Seroquel given x1 for increasing agitation/restlessness.  BEHAVIOR & AGGRESSION TOOL COLOR: green/yellow  CIWA SCORE: NA    ABNL VS/O2: VSS on RA   MOBILITY: UW assist of strong 2 walker/gait belt, falls risk (walker at baseline), L sided weakness from hx stroke, got up to the chair x1.   PAIN MANAGMENT: Tylenol  scheuled  DIET: Regular diet, fair appetite   BOWEL/BLADDER: Incontinent of urine   ABNL LAB/BG: BG 99, 112  DRAIN/DEVICES: PIV infusing LR 50 mL/hr  TELEMETRY RHYTHM: NA  SKIN: Groin & artie area redness blanchable, blanchable coccyx,  old skin tear around rectal area, applie miconazole powder, turn/repo q2h.   TESTS/PROCEDURES: none scheduled   D/C DAY/GOALS/PLACE: 1-2 days. Lives at home with daughter. PT rec'd TCU, pt's daughter in agreement, SW following for placement.   OTHER IMPORTANT INFO: Observation status. On IV Rocephin Q24h for Cystitis. PT/OT/CC following

## 2020-10-03 NOTE — PROGRESS NOTES
SW  I) Sent TCU referral to [facility], which was not sent on Friday.  Obtained [facility] Medicare Advantage [facility] Auth #[id number] for the dates 10/3-10/12.  P) Awaiting response from [facility].    Update  No response yet at 1550. Left a message for Admissions at [facility] asking for an update.

## 2020-10-03 NOTE — PROVIDER NOTIFICATION
Discharge pharmacy called stating that pt has an order for bactrim but has sulfa allergy. Allergy not listed in Epic but is listed on their system. RAISA Madera notified via web-page.

## 2020-10-03 NOTE — PLAN OF CARE
DATE & TIME: 10/2/20 0870-9571  Cognitive Concerns/ Orientation : A&O to self only. Slept throughout most of shift. Calm & cooperative.   BEHAVIOR & AGGRESSION TOOL COLOR: green/yellow. Sign on door.   CIWA SCORE: NA    ABNL VS/O2: VSS on RA   MOBILITY: Strong Ax2 (ind with walker at baseline), L sided weakness from hx stroke. T&R Q2h (refuses at times with agitation)  PAIN MANAGMENT: Denies pain, scheduled tylenol when awake    DIET: Regular diet, fair appetite   BOWEL/BLADDER: Incontinent of urine. No BM overnight   ABNL LAB/BG: BG 93  DRAIN/DEVICES: R arm PIV infusing LR @50 mL/hr  TELEMETRY RHYTHM: NA  SKIN: Groin & artie area redness blanchable, blanchable coccyx, old skin tear around rectal area. Miconazole powder applied  TESTS/PROCEDURES: none scheduled   D/C DAY/GOALS/PLACE: 1-2 days to TCU. SW following  OTHER IMPORTANT INFO: Observation status. On IV Rocephin Q24h for Cystitis. PT/OT following.

## 2020-10-03 NOTE — UTILIZATION REVIEW
"  [facility] Utilization Review  Admission Status; Secondary Review Determination     Admission Date: 10/1/2020  8:47 AM      Under the authority of the Utilization Management Committee, the utilization review process indicated a secondary review on the above patient.  The review outcome is based on review of the medical records, discussions with staff, and applying clinical experience noted on the date of the review.        (X) Observation Status Appropriate - This patient does not meet hospital inpatient criteria and is placed in observation status. If this patient's primary payer is Medicare and was admitted as an inpatient, Condition Code 44 should be used and patient status changed to \"observation\".   () Observation Status concurrent Review           RATIONALE FOR DETERMINATION   89-year-old female with history of vascular dementia, CVA, hypertension, hyperlipidemia, diabetes mellitus, DJD, abdominal aneurysm, admitted with fall, UTI and hip pain.  Patient is admitted to observation status, for pain control to right hip, which is improving now.  Started on IV antibiotics for UTI, and switched to oral antibiotics on discharge.  Patient is waiting for placement, appears to be medically stable, does not meet criteria for inpatient stay, recommend continue observation status      The severity of illness, intensity of service provided, expected LOS make the care appropriate for observation status at this time.        The information on this document is developed by the utilization review team in order for the business office to ensure compliance.  This only denotes the appropriateness of proper admission status and does not reflect the quality of care rendered.         The definitions of Inpatient Status and Observation Status used in making the determination above are those provided in the CMS Coverage Manual, Chapter 1 and Chapter 6, section 70.4.      Sincerely,       Santiago Rai MD  Physician " Advisor  Utilization Review-[place]    Phone: [phone]

## 2020-10-04 NOTE — PLAN OF CARE
DATE & TIME: 10/3/20 9193-7868  Cognitive Concerns/ Orientation : A&O self only. Calm & cooperative, anxious/agitated at times. Refuses cares at times   BEHAVIOR & AGGRESSION TOOL COLOR: Green/yellow. Sign in place  CIWA SCORE: NA    ABNL VS/O2: VSS on RA   MOBILITY: Strong A x2 (ind with walker at baseline), L sided weakness from hx stroke. T&R Q2h (refuses at times with agitation overnight)  PAIN MANAGMENT: Denies pain, scheduled tylenol when awake    DIET: Regular diet  BOWEL/BLADDER: Incontinent of urine. Purewick in place, working well. No BM this shift   ABNL LAB/BG: , 94, & 92 (delayed due to pt refusing, wanting to sleep)   DRAIN/DEVICES: R arm PIV SL. IV abx Q24h  TELEMETRY RHYTHM: NA  SKIN: Groin & artie area redness blanchable, blanchable coccyx, old skin tear around rectal area. Miconazole powder applied  TESTS/PROCEDURES: none scheduled   D/C DAY/GOALS/PLACE: 1-2 days to TCU. SW following, see note regarding Eladio Bradford   OTHER IMPORTANT INFO: On IV Rocephin Q24h for Cystitis. PT/OT following.

## 2020-10-04 NOTE — PROGRESS NOTES
SPIRITUAL HEALTH SERVICES Progress Note  [facility] 66    Visited pt as she saw me visiting pt in next bed and asked me to talk to her. Pt seemed confused as she told me that she has been looking for her father and wanted my help in finding him. She became tearful and told me how much she loved me. I offered her assurance of our love and care for her and offered blessing for her.    SHS remains available.      María August  Chaplain Resident

## 2020-10-04 NOTE — PLAN OF CARE
DATE & TIME: 10/2/20; 0866-2697  Cognitive Concerns/ Orientation : A&O to self and place at times. Calm & cooperative during the day but slightly anxious/restless this evening; PRN Seroquel given. .   BEHAVIOR & AGGRESSION TOOL COLOR: green/yellow; Sign on door.   CIWA SCORE: NA    ABNL VS/O2: VSS except for hypertension; on RA   MOBILITY: Strong Ax2 (ind with walker at baseline), L sided weakness from hx stroke. T&R Q2h (refuses at times with agitation, per report)  PAIN MANAGMENT: Denies pain, scheduled tylenol when awake    DIET: Regular diet, fair appetite   BOWEL/BLADDER: Incontinent of urine. Purewick in place. No BM   ABNL LAB/BG:   DRAIN/DEVICES: R arm IV ecchymotic/ leaking. New IV placed but pt pulled out.  TELEMETRY RHYTHM: NA  SKIN: Groin & artie area redness blanchable, blanchable coccyx, old skin tear around rectal area. Miconazole powder applied  TESTS/PROCEDURES: none scheduled   D/C DAY/GOALS/PLACE: 1-2 days to TCU. SW following  OTHER IMPORTANT INFO: On IV Rocephin Q24h for Cystitis. PT/OT following.

## 2020-10-04 NOTE — PLAN OF CARE
Discharge    Patient discharged to [facility] via cart with HE transport.   Care plan note: VSS on RA, pt up alert to self and place. Eager to leave hospital. LS diminished. Incontinent of B/B. Large BM during shift. Handoff given to HE transport.     Listed belongings gathered and returned to patient. Yes  Care Plan and Patient education resolved: Yes  Prescriptions if needed, hard copies sent with patient  NA  Home and hospital acquired medications returned to patient: NA  Medication Bin checked and emptied on discharge Yes  Follow up appointment made for patient: No

## 2020-10-04 NOTE — PROGRESS NOTES
Care Management Discharge Note    Discharge Planning:  Expected Discharge Date: 10/04/20  Concerns to be Addressed:  [facility] has accepted patient for placement today. Spoke with daughter, Belle who requests wheelchair transport. She was advised patient will be billed for this. [facility] w/c ride at 1430. YG was paged by the bedside RN for orders, which will be faxed to [facility] with the [employer] auth and the PAS.     Patient/Family in Agreement with the Plan:  Yes    PAS # [id number]    JASWINDER Desouza

## 2020-10-04 NOTE — DISCHARGE SUMMARY
[facility]    Discharge Summary  Hospitalist    Date of Admission:  10/1/2020  Date of Discharge:  10/4/2020  Discharging Provider: Murali Sawyer PA-C    Discharge Diagnoses      Urinary tract infection without hematuria, site unspecified  Fall, initial encounter  Hip pain, right  Vascular dementia without behavioral disturbance (H)    History of Present Illness   Reanna Garza is an 89 year old female who presented with right hip pain following fall.    HPI from admission H&P:  Reanna Garza is a 89 year old female with PMH significant for CVA, hypertension, hyperlipidemia, non-insulin-dependent type 2 diabetes, DJD, abdominal aneurysm without rupture who presented to the emergency department with complaints of right hip pain after mechanical fall.  The patient has had frequent falls over the past couple of weeks.  Her daughter has noticed malodorous urine.  Today the patient was ambulating with her usual walker when she slipped on a rug while ambulating from bedroom to bathroom causing the patient to fall forward and landed on her right hip.  No loss of consciousness.  Denies striking of head.  Complained of right hip pain although able to stand and bear weight.  No headache, neck pain, or back pain.  No chest pain, palpitations, shortness of breath, lightheadedness, or dizziness.  No nausea, vomiting, abdominal pain, or flank pain.  No changes to bowels.  No new cough, sore throat, or other respiratory difficulties.  No fevers or chills.  The patient does live with her daughter prior to admission who helps care for her.  No blood thinners prior to admission though does take a daily aspirin 81 mg.     In the ER, patient was hemodynamically stable and afebrile.  Basic labs unremarkable.  X-ray of the right hip without acute pathology or fracture.  Given the patient is able to stand and bear weight and utilize bedside commode do not feel a CT scan would be necessary in this case.   UA is infectious appearing with positive nitrites, large leukocyte esterase, and 84 white blood cells.  Urine culture obtained and pending.  EKG rate 56 bpm sinus bradycardia, LAD.  No significant change from prior study.  She is not septic on admission.  Blood cultures were not drawn.  She will receive 1 dose of Rocephin in the ER and be swabbed for asymptomatic COVID-19 admission screen.    Hospital Course   Reanna Garza was admitted on 10/1/2020.  The following problems were addressed during her hospitalization:    Right hip pain s/p mechanical fall, improved  Recent frequent falls x2 weeks PTA.  Slipped while ambulating with walker to bathroom resulting in fall at right hip.  Immediate hip pain though able to bear weight and ambulate. Basic labs unremarkable. VSS. Afebrile. Rt Hip XR Neg for acute pathology or fracture. Per notes, PCP was attempting to set up HHA, PT/OT.  - Pain control: APAP scheduled, PRN ice to Rt hip, judicious use of opioids given advanced age and history of dementia  - Right hip pain much improved 10/3  - Baseline ambulation with walker at home with daughter  - PT/OT/SW/CC consulted, PT recommending TCU  - No CT necessary given patient has been bearing weight and able to ambulate since her fall.  If she has more difficulty or is unable to walk would reconsider CT scan.     Acute cystitis  Frequent falls as above and malodorous urine per daughter. No fevers, chills, or flank pain. UA concerning for infection, treated empirically with Rocephin. UCx returned with mixed urogenital uday. Not discharged with further Abx.     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 Saw Neurologist 8/28 - pt with memory problems, worse with hyperglycemia. Stroke x5 yrs ago with left sided weakness. Memory problem progressed after stroke. Sundowning at home, more confused, legs give out at times, talking to people not there, and confused about family members.  Hallucinations for the past 2-3 years. Verbally aggressive at times. Required 24 hour care at home, patient typically able to do her own ADLs.  - Continue PTA donepezil dosing - started 5mg x2 months 8/28/20 --> uptitrate to 10mg after 2 months per neuro note  - Tends to call out for Belle (daughter) and Dad (her ), needs to be reassured that she will see them soon and reoriented. More irritated in the evening. Will add very low dose Seroquel PRN and scheduled melatonin for night.     Insulin-dependent type 2 diabetes, well controlled  PTA regimen of Glipizide 5 mg BID. Last Hgb A1c 5.4% 10/1/20.  - Discontinue PTA glipizide now and on discharge given advanced age and very well controlled DM2, for prevention of hypoglycemia     Hypertension  - Continue PTA losartan 50 mg nightly with hold parameters.       Other chronic stable diagnoses: Medications will be resumed as appropriate and nonessential medications held for observation status.  Restless leg syndrome  Abdominal aneurysm  Macular degeneration  Vitamin D deficiency  Lumbar spondylolysis  Hyperlipidemia  History of CVA with left sided weakness       Murali Sawyer PA-C    Significant Results and Procedures   As noted    Pending Results   These results will be followed up by n/a  Unresulted Labs Ordered in the Past 30 Days of this Admission     No orders found from 9/1/2020 to 10/2/2020.          Code Status   Full Code       Primary Care Physician   Chelita Curtis    Physical Exam   Temp: 97.5  F (36.4  C) Temp src: Axillary BP: 136/64 Pulse: 53   Resp: 19 SpO2: 96 % O2 Device: None (Room air)    Vitals:    10/01/20 0854 10/01/20 1718   Weight: 72.6 kg (160 lb) 70.6 kg (155 lb 10.3 oz)     Vital Signs with Ranges  Temp:  [97.5  F (36.4  C)-98.3  F (36.8  C)] 97.5  F (36.4  C)  Pulse:  [53-65] 53  Resp:  [18-19] 19  BP: (121-171)/(59-80) 136/64  SpO2:  [96 %-98 %] 96 %  I/O last 3 completed shifts:  In: 680 [P.O.:680]  Out: 2639  [Urine:1425]    GEN: well-developed, well-nourished, appears comfortable  PULM: lungs CTA bilaterally, no increased work of breathing, no wheeze, rales, rhonchi  CV: RRR, S1 & S2, no murmur  GI: soft, nontender, nondistended, no guarding or rigidity, +BS in all 4 quadrants  SKIN: warm & dry without rash, wound, or pedal edema    Discharge Disposition   Discharged to home  Condition at discharge: Stable    Consultations This Hospital Stay   CARE MANAGEMENT / SOCIAL WORK IP CONSULT  PHYSICAL THERAPY ADULT IP CONSULT  OCCUPATIONAL THERAPY ADULT IP CONSULT  PHYSICAL THERAPY ADULT IP CONSULT  OCCUPATIONAL THERAPY ADULT IP CONSULT    Time Spent on this Encounter   IMurali PA-C, personally saw the patient today and spent less than or equal to 30 minutes discharging this patient.    Discharge Orders      General info for SNF    Length of Stay Estimate: Short Term Care: Estimated # of Days <30  Condition at Discharge: Improving  Level of care:skilled   Rehabilitation Potential: Good  Admission H&P remains valid and up-to-date: Yes  Recent Chemotherapy: N/A  Use Nursing Home Standing Orders: Yes     Mantoux instructions    Give two-step Mantoux (PPD) Per Facility Policy Yes     Glucose monitor nursing POCT    Before meals and at bedtime     Activity - Up with nursing assistance     Physical Therapy Adult Consult    Evaluate and treat as clinically indicated.    Reason:  Physical deconditioning, hip pain     Occupational Therapy Adult Consult    Evaluate and treat as clinically indicated.    Reason:  Physical deconditioning, hip pain     Fall precautions     Advance Diet as Tolerated    Follow this diet upon discharge: Orders Placed This Encounter      Regular Diet Adult     Discharge Medications   Current Discharge Medication List      START taking these medications    Details   QUEtiapine (SEROQUEL) 25 MG tablet Take 0.5 tablets (12.5 mg) by mouth 3 times daily as needed (agitation)  Qty: 10 tablet, Refills: 0     Comments: Future refills by PCP Dr. Chelita Curtis with phone number [phone].  Associated Diagnoses: Vascular dementia without behavioral disturbance (H)         CONTINUE these medications which have NOT CHANGED    Details   acetaminophen (TYLENOL) 325 MG tablet Take 650 mg by mouth every 4 hours as needed (hip pain)      aspirin 81 MG EC tablet Take 81 mg by mouth 2 times daily      calcium citrate-vitamin D (CITRACAL) 315-200 MG-UNIT TABS per tablet Take 1 tablet by mouth daily      !! donepezil (ARICEPT) 5 MG tablet 5mg tab by mouth daily every morning for 2 months then increase to 10mg daily  Qty: 60 tablet, Refills: 11    Associated Diagnoses: Dementia with behavioral disturbance, unspecified dementia type (H)      losartan (COZAAR) 50 MG tablet Take 50 mg by mouth At Bedtime      lovastatin (MEVACOR) 40 MG tablet TAKE 1 TABLET BY MOUTH EVERYDAY AT BEDTIME  Qty: 90 tablet, Refills: 3    Associated Diagnoses: Hyperlipidemia LDL goal <100      melatonin 3 MG tablet Take 6 mg by mouth At Bedtime      !! multivitamin (CENTRUM SILVER) tablet Take 1 tablet by mouth daily      !! multivitamin (OCUVITE) TABS tablet Take 1 tablet by mouth daily      vitamin D3 (CHOLECALCIFEROL) 50 mcg (2000 units) tablet Take 1 tablet by mouth 2 times daily      !! donepezil (ARICEPT) 10 MG tablet Take 1 tablet (10 mg) by mouth At Bedtime  Qty: 30 tablet, Refills: 11    Associated Diagnoses: Late onset Alzheimer's disease with behavioral disturbance (H)       !! - Potential duplicate medications found. Please discuss with provider.      STOP taking these medications       glipiZIDE (GLUCOTROL) 5 MG tablet Comments:   Reason for Stopping:         ibuprofen (ADVIL/MOTRIN) 200 MG capsule Comments:   Reason for Stopping:             Allergies   Allergies   Allergen Reactions     Codeine      Hives       Codeine Sulfate Hives     Penicillins Hives     Sulfa Drugs Unknown     Data   Most Recent 3 CBC's:  Recent Labs   Lab Test  10/02/20  0904 10/01/20  0920 07/21/20  0557   WBC 5.8 6.5 6.5   HGB 11.5* 12.1 10.8*   MCV 86 87 86    222 231      Most Recent 3 BMP's:  Recent Labs   Lab Test 10/02/20  0904 10/01/20  0920 07/21/20  0557    140 135   POTASSIUM 3.6 3.4 3.5   CHLORIDE 111* 108 102   CO2 29 28 26   BUN 21 24 20   CR 0.64 0.73 0.67   ANIONGAP 3 4 7   YURI 8.6 8.6 9.1   GLC 96 106* 85     Most Recent 2 LFT's:  Recent Labs   Lab Test 07/16/20  1413 05/03/19  1232   AST 40 27   ALT 35 20   ALKPHOS 55 52   BILITOTAL 0.5 0.6     Most Recent INR's and Anticoagulation Dosing History:  Anticoagulation Dose History     Recent Dosing and Labs Latest Ref Rng & Units 6/25/2007 5/26/2015    INR 0.86 - 1.14 0.95 1.03        Most Recent 3 Troponin's:No lab results found.  Most Recent Cholesterol Panel:  Recent Labs   Lab Test 05/03/19  1232   CHOL 128   LDL 69   HDL 38*   TRIG 103     Most Recent 6 Bacteria Isolates From Any Culture (See EPIC Reports for Culture Details):  Recent Labs   Lab Test 10/01/20  1027   CULT >100,000 colonies/mL  mixed urogenital uday  Multiple morphotypes present with no predominant organism.  Growth consistent with   probable contamination during collection.  Suggest repeat specimen if clinically   indicated.  Susceptibility testing not routinely done       Most Recent TSH, T4 and A1c Labs:  Recent Labs   Lab Test 10/01/20  0920 10/11/17  1410 10/11/17  1410   TSH  --   --  2.29   A1C 5.4   < > 5.7    < > = values in this interval not displayed.     Results for orders placed or performed during the hospital encounter of 10/01/20   XR Pelvis w Hip Right 1 View    Narrative    XR PELVIS AND HIP RIGHT 1 VIEW  10/1/2020 9:40 AM     HISTORY: Fall, right hip pain    COMPARISON: 7/16/2020      Impression    IMPRESSION: No acute fracture or malalignment. Severe right hip  degenerative changes with bone-on-bone articulation. Moderate left hip  degenerative changes. Moderate bilateral sacroiliac joint  degenerative  changes. Severe degenerative changes of the lower lumbar spine.  Osteopenia.     HELENA PACHECO MD

## 2020-10-05 NOTE — PROGRESS NOTES
[place] [facility]  PRIMARY CARE PROVIDER AND CLINIC:  Chelita Curtis MD, [id number] [address]  Chief Complaint   Patient presents with     Hospital F/U     [place] Medical Record Number:  [id number]  Place of Service where encounter took place:  [facility] ([facility]) [[id number]]    Reanna Garza  is a 89 year old  (12/25/1927), admitted to the above facility from  [facility]. Hospital stay 10/1/20 through 10/4/20..  Admitted to this facility for  rehab, medical management and nursing care.    HPI:    HPI information obtained from: facility chart records, facility staff and patient report.   Brief Summary of Hospital Course:   Updates on Status Since Skilled nursing Admission:     Patient Reanna Garza is 89 yr old female admitted to [facility] for rehabilitation s/p hospitalization for fall with right hip pain. negative for fracture. negative UA/UC  Lives with daughter. Walks with walker    PMHx frequent fall over past couple of weeks, dementia, hypertension, hyperlipidemia, hx CVA with left sided weakness, non-insulin-dependent type 2 diabetes, DJD, abdominal aneurysm without rupture, RLS, macular degeneration and lumbar spondylolysis    TODAY  Patient states she feels fine. Eating meals  Denies pain today  Review code status and patient wishes to be full code          CODE STATUS/ADVANCE DIRECTIVES DISCUSSION:   CPR/Full code   Patient's living condition: lives with family, dgtr   ALLERGIES: Codeine, Codeine sulfate, Penicillins, and Sulfa drugs  PAST MEDICAL HISTORY:  has a past medical history of Abdominal aneurysm without mention of rupture, Arthritis, CVA (cerebral infarction), DJD (degenerative joint disease), Hyperlipidemia LDL goal <130, Hypertension goal BP (blood pressure) < 140/90, Low back pain, Right hip pain, Stroke (H), and Type 2 diabetes, HbA1C goal < 8% (H).  PAST SURGICAL HISTORY:   has a past surgical  history that includes TOTAL KNEE ARTHROPLASTY (6/04); CHOLECYSTOENTEROSTOMY; SHOULDER ARTHROSCOPY, DX (12/05); TOTAL ABDOM HYSTERECTOMY; replacement socket, shoulder disarticulation/interscapular thoracic, molded to (6/07); Extracapsular cataract extration with intraocular lens implant; and Blepharoplasty, brow lift, combined (6/10/2013).  FAMILY HISTORY: family history includes Alzheimer Disease in her sister; Breast Cancer in her sister; Cancer in her brother, sister, sister, sister, and sister.  SOCIAL HISTORY:   reports that she quit smoking about 20 years ago. She has never used smokeless tobacco. She reports current alcohol use. She reports that she does not use drugs.    Post Discharge Medication Reconciliation Status: discharge medications reconciled and changed, per note/orders    Current Outpatient Medications   Medication Sig Dispense Refill     acetaminophen (TYLENOL) 325 MG tablet Take 650 mg by mouth every 4 hours as needed (hip pain)       aspirin 81 MG EC tablet Take 81 mg by mouth 2 times daily       calcium citrate-vitamin D (CITRACAL) 315-200 MG-UNIT TABS per tablet Take 1 tablet by mouth daily       [START ON 10/22/2020] donepezil (ARICEPT) 10 MG tablet Take 1 tablet (10 mg) by mouth At Bedtime 30 tablet 11     donepezil (ARICEPT) 5 MG tablet 5mg tab by mouth daily every morning for 2 months then increase to 10mg daily 60 tablet 11     losartan (COZAAR) 50 MG tablet Take 50 mg by mouth At Bedtime       lovastatin (MEVACOR) 40 MG tablet TAKE 1 TABLET BY MOUTH EVERYDAY AT BEDTIME 90 tablet 3     melatonin 3 MG tablet Take 6 mg by mouth At Bedtime       multivitamin (CENTRUM SILVER) tablet Take 1 tablet by mouth daily       multivitamin (OCUVITE) TABS tablet Take 1 tablet by mouth daily       QUEtiapine (SEROQUEL) 25 MG tablet Take 0.5 tablets (12.5 mg) by mouth 3 times daily as needed (agitation) 10 tablet 0     vitamin D3 (CHOLECALCIFEROL) 50 mcg (2000 units) tablet Take 1 tablet by mouth 2 times  "daily         ROS:  10 point ROS of systems including Constitutional, Eyes, Respiratory, Cardiovascular, Gastroenterology, Genitourinary, Integumentary, Musculoskeletal, Psychiatric were all negative except for pertinent positives noted in my HPI.    Vitals:  /67   Pulse 61   Temp 97.6  F (36.4  C)   Resp 18   Ht 1.626 m (5' 4\")   Wt 71.4 kg (157 lb 8 oz)   SpO2 96%   BMI 27.03 kg/m    Exam:  GENERAL APPEARANCE:  Alert, in no distress  ENT:  Mouth and posterior oropharynx normal, moist mucous membranes  EYES:  EOM, conjunctivae, lids, pupils and irises normal  NECK:  No adenopathy,masses or thyromegaly  RESP:  no respiratory distress  M/S:   sitting in WC  SKIN:  Inspection of skin and subcutaneous tissue baseline  NEURO:   Cranial nerves 2-12 are normal tested and grossly at patient's baseline  PSYCH:  Mood stable    Lab/Diagnostic data:  Labs done in SNF are in [facility]. Please refer to them using [facility]Care Everywhere.    ASSESSMENT/PLAN:  Right hip pain s/p mechanical fall, improved  Pain controlled with tylenol and ice; negative for fracture  Continue therapies   Ambulates with walker at baseline   involved in safe plan home  Has been walking with walker. 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 Saw Neurologist 8/28 - pt with memory problems, worse with hyperglycemia. Stroke x5 yrs ago with left sided weakness. Memory problem progressed after stroke. Sundowning at home, more confused, legs give out at times, talking to people not there, and confused about family members. Hallucinations for the past 2-3 years. Verbally aggressive at times. Required 24 hour care at home, patient typically able to do her own ADLs.  - Continue PTA donepezil dosing - started 5mg x2 months 8/28/20 --> uptitrate to 10mg after 2 months per neuro note  - Tends to call out for Belle (daughter) and Dad (her " ), needs to be reassured that she will see them soon and reoriented. More irritated in the evening. Will add very low dose Seroquel PRN and scheduled melatonin for night.     Insulin-dependent type 2 diabetes, well controlled  Glipizide discontinue given age and well controlled DM2. Last Hgb A1c 5.4% 10/1/20.  Avoid hypoglycemia     Hypertension  chronic managed   Continue losartan 50 mg, monitor             Total time spent with patient visit at the skilled nursing facility was 39 min including patient visit and review of past records. Greater than 50% of total time spent with counseling and coordinating care due to discussion on functional goals, advanced care planning, chronic disease management and follow up labs.     Electronically signed by:  MARGARITA Boucher CNP

## 2020-10-05 NOTE — PLAN OF CARE
Physical Therapy Discharge Summary    Reason for therapy discharge:    Discharged to transitional care facility.    Progress towards therapy goal(s). See goals on Care Plan in [facility] electronic health record for goal details.  Goals not met.  Barriers to achieving goals:   discharge from facility.    Therapy recommendation(s):    Continued therapy is recommended.  Rationale/Recommendations:  to improve IND with functional mobility as pt below baseline at this time.

## 2020-10-09 NOTE — PROGRESS NOTES
[place] [facility]  [place] Medical Record Number:  [id number]  Place of Service where encounter took place:  [facility] ([facility]) [[id number]]  Chief Complaint   Patient presents with     RECHECK       HPI:    Reanna Garza  is a 89 year old (12/25/1927), who is being seen today for an episodic care visit.  HPI information obtained from: facility chart records, facility staff and patient report. Today's concern is:    Patient Reanna Garza is 89 yr old female admitted to [facility] for rehabilitation s/p hospitalization for fall with right hip pain. negative for fracture. negative UA/UC  Lives with daughter. Walks with walker    PMHx frequent fall over past couple of weeks, dementia, hypertension, hyperlipidemia, hx CVA with left sided weakness, non-insulin-dependent type 2 diabetes, DJD, abdominal aneurysm without rupture, RLS, macular degeneration and lumbar spondylolysis       Fall, sequela  Physical deconditioning  Vascular dementia without behavioral disturbance (H)     Patient states eating meals; per dietary patient stated yesterday she thought food had poison in it and was not eating. No statements like that today. Eating % meals  Participating in therapies and making progress  Labs, vitals stable with no acute concerns   Pain managed     Therapies update   Ambulation: 10 ft with FWW Min   Sit to Stand: Mod   Transfers: Mod with max verbal cues   Bed Mobility: Mod   UB Dressing: Mod   LB Dressing: Max   Toileting: Max to total dependence   Grooming/Hygiene: Min    Patient states her roommate is annoying, however, that she is getting along alright    Updated hair [place] today on patient status and no acute concerns  She states the goal is for Reanna to return home and live with her and get homecare    Past Medical and Surgical History reviewed in Epic today.    MEDICATIONS:    Current Outpatient Medications   Medication Sig Dispense Refill      "acetaminophen (TYLENOL) 325 MG tablet Take 650 mg by mouth every 4 hours as needed (hip pain)       aspirin 81 MG EC tablet Take 81 mg by mouth 2 times daily       calcium citrate-vitamin D (CITRACAL) 315-200 MG-UNIT TABS per tablet Take 1 tablet by mouth daily       [START ON 10/22/2020] donepezil (ARICEPT) 10 MG tablet Take 1 tablet (10 mg) by mouth At Bedtime 30 tablet 11     donepezil (ARICEPT) 5 MG tablet 5mg tab by mouth daily every morning for 2 months then increase to 10mg daily 60 tablet 11     losartan (COZAAR) 50 MG tablet Take 50 mg by mouth At Bedtime       lovastatin (MEVACOR) 40 MG tablet TAKE 1 TABLET BY MOUTH EVERYDAY AT BEDTIME 90 tablet 3     melatonin 3 MG tablet Take 6 mg by mouth At Bedtime       multivitamin (CENTRUM SILVER) tablet Take 1 tablet by mouth daily       multivitamin (OCUVITE) TABS tablet Take 1 tablet by mouth daily       QUEtiapine (SEROQUEL) 25 MG tablet Take 0.5 tablets (12.5 mg) by mouth 3 times daily as needed (agitation) 10 tablet 0     vitamin D3 (CHOLECALCIFEROL) 50 mcg (2000 units) tablet Take 1 tablet by mouth 2 times daily           REVIEW OF SYSTEMS:  10 point ROS of systems including Constitutional, Eyes, Respiratory, Cardiovascular, Gastroenterology, Genitourinary, Integumentary, Musculoskeletal, Psychiatric were all negative except for pertinent positives noted in my HPI.    Objective:  /89   Pulse 65   Temp 98.2  F (36.8  C)   Resp 18   Ht 1.626 m (5' 4\")   Wt 70.3 kg (154 lb 14.4 oz)   SpO2 96%   BMI 26.59 kg/m    Exam:  GENERAL APPEARANCE:  Alert, in no distress  ENT:  Mouth and posterior oropharynx normal, moist mucous membranes  EYES:  EOM, conjunctivae, lids, pupils and irises normal  NECK:  No adenopathy,masses or thyromegaly  RESP:  no respiratory distress  M/S:   sitting in WC  SKIN:  Inspection of skin and subcutaneous tissue baseline  NEURO:   Cranial nerves 2-12 are normal tested and grossly at patient's baseline  PSYCH:  affect and mood " normal    Labs:   Labs done in SNF are in [place] EPIC. Please refer to them using EPIC/Care Everywhere.    ASSESSMENT/PLAN:     Fall, sequela  Physical deconditioning  Vascular dementia without behavioral disturbance (H)     Making progress in therapies; continue therapies   involved in safe plan home  Mood stable, monitor   Seroquel used few time; max once daily  Decrease as needed Seroquel to 12.5mg daily as needed           Electronically signed by:  MARGARITA Boucher CNP

## 2020-10-13 NOTE — PROGRESS NOTES
[facility] DISCHARGE SUMMARY  PATIENT'S NAME: Reanna Garza  YOB: 1927  MEDICAL RECORD NUMBER:  [id number]  Place of Service where encounter took place:  [facility] ([facility]) [[id number]]    PRIMARY CARE PROVIDER AND CLINIC RESPONSIBLE AFTER TRANSFER:   Chelita Curtis MD, [address] / [facility] [address]    Non-FMG Provider     Transferring providers: Lily Llanos, APRN CNP, MD  Recent Hospitalization/ED:  [facility] Hospital stay 10/1/20 to 10/4/20.  Date of SNF Admission: October / 04 / 2020  Date of SNF (anticipated) Discharge: October / 17 / 2020  Discharged to: previous independent home with [facility]: PT, OT, HHA and RN  Cognitive Scores/Physical Function/DME:    Ambulation: 10 ft with FWW Min   Sit to Stand: Mod   Transfers: Mod with max verbal cues   Bed Mobility: Mod   UB Dressing: Mod   LB Dressing: Max   Toileting: Max to total dependence   Grooming/Hygiene: Min      CODE STATUS/ADVANCE DIRECTIVES DISCUSSION:  Full Code   ALLERGIES: Codeine, Codeine sulfate, Penicillins, and Sulfa drugs    DISCHARGE DIAGNOSIS/NURSING FACILITY COURSE:     Patient Reanna Garza is 89 yr old female admitted to [facility] for rehabilitation s/p hospitalization for fall with right hip pain. negative for fracture. negative UA/UC  Lives with daughter. 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 Saw Neurologist 8/28 - pt with memory problems, worse with hyperglycemia. Stroke x5 yrs ago with left sided weakness. Memory problem progressed after stroke. Sundowning at home, more confused, legs give out at times, talking to people not there, and confused about family members. Hallucinations for the past 2-3 years. Verbally aggressive at times. Required 24 hour care at home, patient typically able to do her own ADLs.  - Continue PTA donepezil dosing - started 5mg x2 months 8/28/20 --> uptitrate to 10mg after 2 months per neuro note  - Tends to call out for Belle (daughter) and Dad (her ), needs to be reassured that she will see them soon and reoriented. More irritated in the evening. Will add very low dose Seroquel PRN and scheduled melatonin for night.     Insulin-dependent type 2 diabetes, well controlled  Glipizide discontinue given age and well controlled DM2. Last Hgb A1c 5.4% 10/1/20.  Avoid hypoglycemia  blood sugar managed with diet      Hypertension  chronic managed   Continue losartan 50 mg, monitor      Past Medical History:  has a past medical history of Abdominal aneurysm without mention of rupture, Arthritis, CVA (cerebral infarction), DJD (degenerative joint disease), Hyperlipidemia LDL goal <130, Hypertension goal BP (blood pressure) < 140/90, Low back pain, Right hip pain, Stroke (H), and Type 2 diabetes, HbA1C goal < 8% (H).    Discharge Medications:    Current Outpatient Medications   Medication Sig Dispense Refill     acetaminophen (TYLENOL) 325 MG tablet Take 650 mg by mouth every 4 hours as needed (hip pain)       aspirin 81 MG EC tablet Take 81 mg by mouth 2 times daily       calcium citrate-vitamin D (CITRACAL) 315-200 MG-UNIT TABS per tablet Take 1 tablet by mouth daily       [START ON 10/22/2020] donepezil (ARICEPT) 10 MG tablet Take 1 tablet (10 mg) by mouth At Bedtime (Patient taking differently: Take 10 mg by mouth At Bedtime  "Starts 10/22) 30 tablet 11     donepezil (ARICEPT) 5 MG tablet 5mg tab by mouth daily every morning for 2 months then increase to 10mg daily 60 tablet 11     losartan (COZAAR) 50 MG tablet Take 50 mg by mouth At Bedtime       lovastatin (MEVACOR) 40 MG tablet TAKE 1 TABLET BY MOUTH EVERYDAY AT BEDTIME 90 tablet 3     melatonin 3 MG tablet Take 6 mg by mouth At Bedtime       multivitamin (CENTRUM SILVER) tablet Take 1 tablet by mouth daily       QUEtiapine (SEROQUEL) 12.5 mg TABS half-tab Take 12.5 mg by mouth daily as needed       vitamin D3 (CHOLECALCIFEROL) 50 mcg (2000 units) tablet Take 1 tablet by mouth 2 times daily         Medication Changes/Rationale:         Controlled medications sent with patient:   not applicable/none     ROS:   10 point ROS of systems including Constitutional, Eyes, Respiratory, Cardiovascular, Gastroenterology, Genitourinary, Integumentary, Musculoskeletal, Psychiatric were all negative except for pertinent positives noted in my HPI.    Physical Exam:   Vitals: BP (!) 150/80   Pulse 66   Temp 98.3  F (36.8  C)   Resp 18   Ht 1.626 m (5' 4\")   Wt 70.3 kg (155 lb)   SpO2 99%   BMI 26.61 kg/m    BMI= Body mass index is 26.61 kg/m .  GENERAL APPEARANCE:  Alert, in no distress  ENT:  Mouth and posterior oropharynx normal, moist mucous membranes  EYES:  EOM, conjunctivae, lids, pupils and irises normal  NECK:  No adenopathy,masses or thyromegaly  RESP:  no respiratory distress  M/S:   sitting on WC  SKIN:  Inspection of skin and subcutaneous tissue baseline  NEURO:   Cranial nerves 2-12 are normal tested and grossly at patient's baseline  PSYCH:  memory impaired , affect and mood normal     SNF labs: Labs done in SNF are in [place] EPIC. Please refer to them using EPIC/Care Everywhere.      DISCHARGE PLAN:    Follow up labs: No labs orders/due    Medical Follow Up:      Follow up with primary care provider in 1-2 weeks    MTM referral needed and placed by this provider: No    Current " [place] scheduled appointments:       Discharge Services: Home Care:  Occupational Therapy, Physical Therapy, Registered Nurse, Home Health Aide and From:  [facility]      TOTAL DISCHARGE TIME:   Greater than 30 minutes  Electronically signed by:  MARGARITA Boucher CNP     Home care Face to Face documentation done in EPIC attached to Home care orders for NON- [facility] homecare.

## 2020-10-14 NOTE — LETTER
10/14/2020        RE: Reanna Garza  [address] [facility] [address]        [facility] DISCHARGE SUMMARY  PATIENT'S NAME: Reanna Garza  YOB: 1927  MEDICAL RECORD NUMBER:  [id number]  Place of Service where encounter took place:  [facility] ([facility]) [[id number]]    PRIMARY CARE PROVIDER AND CLINIC RESPONSIBLE AFTER TRANSFER:   Chelita Curtis MD, [address] [facility] [address]    Non-[facility]G Provider     Transferring providers: Lily Llanos, APRN CNP, MD  Recent Hospitalization/ED:  [facility] Hospital stay 10/1/20 to 10/4/20.  Date of SNF Admission: October / 04 / 2020  Date of SNF (anticipated) Discharge: October / 17 / 2020  Discharged to: previous independent home with [facility]: PT, OT, HHA and RN  Cognitive Scores/Physical Function/DME:    Ambulation: 10 ft with FWW Min   Sit to Stand: Mod   Transfers: Mod with max verbal cues   Bed Mobility: Mod   UB Dressing: Mod   LB Dressing: Max   Toileting: Max to total dependence   Grooming/Hygiene: Min      CODE STATUS/ADVANCE DIRECTIVES DISCUSSION:  Full Code   ALLERGIES: Codeine, Codeine sulfate, Penicillins, and Sulfa drugs    DISCHARGE DIAGNOSIS/NURSING FACILITY COURSE:     Patient Reanna Garza is 89 yr old female admitted to [facility] for rehabilitation s/p hospitalization for fall with right hip pain. negative for fracture. negative UA/UC  Lives with daughter. Walks with walker    PMHx frequent fall over past couple of weeks, dementia, hypertension, hyperlipidemia, hx CVA with left sided weakness, non-insulin-dependent type 2 diabetes, DJD, abdominal aneurysm without rupture, RLS, macular degeneration and lumbar spondylolysis      Right hip pain s/p mechanical fall, improved  Pain controlled with tylenol; negative for fracture  Continue therapies   Ambulates with walker at baseline and has been walking with  walker  Plans to discharge home with homecare    Moderate dementia due to mixed vascular and alzheimer's disease  Per hospital note  Lives with daughter. Saw Neurologist 8/28 - pt with memory problems, worse with hyperglycemia. Stroke x5 yrs ago with left sided weakness. Memory problem progressed after stroke. Sundowning at home, more confused, legs give out at times, talking to people not there, and confused about family members. Hallucinations for the past 2-3 years. Verbally aggressive at times. Required 24 hour care at home, patient typically able to do her own ADLs.  - Continue PTA donepezil dosing - started 5mg x2 months 8/28/20 --> uptitrate to 10mg after 2 months per neuro note  - Tends to call out for Belle (daughter) and Dad (her ), needs to be reassured that she will see them soon and reoriented. More irritated in the evening. Will add very low dose Seroquel PRN and scheduled melatonin for night.     Insulin-dependent type 2 diabetes, well controlled  Glipizide discontinue given age and well controlled DM2. Last Hgb A1c 5.4% 10/1/20.  Avoid hypoglycemia  blood sugar managed with diet      Hypertension  chronic managed   Continue losartan 50 mg, monitor      Past Medical History:  has a past medical history of Abdominal aneurysm without mention of rupture, Arthritis, CVA (cerebral infarction), DJD (degenerative joint disease), Hyperlipidemia LDL goal <130, Hypertension goal BP (blood pressure) < 140/90, Low back pain, Right hip pain, Stroke (H), and Type 2 diabetes, HbA1C goal < 8% (H).    Discharge Medications:    Current Outpatient Medications   Medication Sig Dispense Refill     acetaminophen (TYLENOL) 325 MG tablet Take 650 mg by mouth every 4 hours as needed (hip pain)       aspirin 81 MG EC tablet Take 81 mg by mouth 2 times daily       calcium citrate-vitamin D (CITRACAL) 315-200 MG-UNIT TABS per tablet Take 1 tablet by mouth daily       [START ON 10/22/2020] donepezil (ARICEPT) 10 MG tablet  "Take 1 tablet (10 mg) by mouth At Bedtime (Patient taking differently: Take 10 mg by mouth At Bedtime Starts 10/22) 30 tablet 11     donepezil (ARICEPT) 5 MG tablet 5mg tab by mouth daily every morning for 2 months then increase to 10mg daily 60 tablet 11     losartan (COZAAR) 50 MG tablet Take 50 mg by mouth At Bedtime       lovastatin (MEVACOR) 40 MG tablet TAKE 1 TABLET BY MOUTH EVERYDAY AT BEDTIME 90 tablet 3     melatonin 3 MG tablet Take 6 mg by mouth At Bedtime       multivitamin (CENTRUM SILVER) tablet Take 1 tablet by mouth daily       QUEtiapine (SEROQUEL) 12.5 mg TABS half-tab Take 12.5 mg by mouth daily as needed       vitamin D3 (CHOLECALCIFEROL) 50 mcg (2000 units) tablet Take 1 tablet by mouth 2 times daily         Medication Changes/Rationale:         Controlled medications sent with patient:   not applicable/none     ROS:   10 point ROS of systems including Constitutional, Eyes, Respiratory, Cardiovascular, Gastroenterology, Genitourinary, Integumentary, Musculoskeletal, Psychiatric were all negative except for pertinent positives noted in my HPI.    Physical Exam:   Vitals: BP (!) 150/80   Pulse 66   Temp 98.3  F (36.8  C)   Resp 18   Ht 1.626 m (5' 4\")   Wt 70.3 kg (155 lb)   SpO2 99%   BMI 26.61 kg/m    BMI= Body mass index is 26.61 kg/m .  GENERAL APPEARANCE:  Alert, in no distress  ENT:  Mouth and posterior oropharynx normal, moist mucous membranes  EYES:  EOM, conjunctivae, lids, pupils and irises normal  NECK:  No adenopathy,masses or thyromegaly  RESP:  no respiratory distress  M/S:   sitting on WC  SKIN:  Inspection of skin and subcutaneous tissue baseline  NEURO:   Cranial nerves 2-12 are normal tested and grossly at patient's baseline  PSYCH:  memory impaired , affect and mood normal     SNF labs: Labs done in SNF are in [place] EPIC. Please refer to them using EPIC/Care Everywhere.      DISCHARGE PLAN:    Follow up labs: No labs orders/due    Medical Follow Up:      Follow up " with primary care provider in 1-2 weeks    MTM referral needed and placed by this provider: No    Current [place] scheduled appointments:       Discharge Services: Home Care:  Occupational Therapy, Physical Therapy, Registered Nurse, Home Health Aide and From:  [facility]      TOTAL DISCHARGE TIME:   Greater than 30 minutes  Electronically signed by:  MARGARITA Boucher CNP     Home care Face to Face documentation done in EPIC attached to Home care orders for NON- [facility] homecare.                       Sincerely,        MARGARITA Boucher CNP

## 2020-10-20 NOTE — TELEPHONE ENCOUNTER
Left message on voice mail OK to delay until tomorrow due to staffing.  Needs to be seen tomorrow for start of care.  Nayana Larkin RN

## 2020-10-20 NOTE — TELEPHONE ENCOUNTER
Reason for Call:  Other call back / orders    Detailed comments: [facility] is requesting verbal order for delay start of care for nursing, PT OT and home health aid - going out tomorrow due to availability. Please assist. Thanks!    Phone Number Patient can be reached at: deb [phone]    Best Time: Any    Can we leave a detailed message on this number? YES    Call taken on 10/20/2020 at 11:12 AM by Chari Arenas

## 2020-10-20 NOTE — TELEPHONE ENCOUNTER
No further action. VM left relaying ok to delay of start of care.    Thanks! Jessica Augustin RN

## 2020-10-21 NOTE — TELEPHONE ENCOUNTER
I called and approved home care orders:    Skilled Nursing: eval wound    Home health Aid: 1x a week x 7 weeks for shower and assist     Social  Work: Eval     PT: 2x a week x 3 weeks, 1x a week x 4 weeks     OT: Eval.    Lenora Solitario RN

## 2020-10-21 NOTE — TELEPHONE ENCOUNTER
Reason for Call:  Home Health Care    Kelsi with [facility] called regarding (reason for call): verbal orders    Orders are needed for this patient. Home care    Home health Aid 1x a week for 7 weeks for shower and assist    Social  Work: Eval    PT: 2x a week for 3 weeks, 1x a week for 4 weeks    OT: Eval    Skilled Nursing: eval wound    Pt Provider: Dr Curtis    Phone Number Homecare Nurse can be reached at: [phone]    Can we leave a detailed message on this number? YES    Phone number patient can be reached at:     Best Time:     Call taken on 10/21/2020 at 1:44 PM by Mai Jacobson

## 2020-10-22 NOTE — ED NOTES
I was asked to assist with a possible discharge plan as this patient just got [place] [facility] [place] and when the [facility] came to the house she said this patient needed to go to the ER as she has a UTI.  I spoke with this patients daughter Belle who lives with this patient and cares for her.  Belle said there are 3 of them that rotate caring for both the mother and father.  Belle said she isn't able to lift her off the floor.  Belle has been calling 911 to lift this patient up off the floor.  I had a discussion with Belle that this patients recovery will take months and that she may not get back to her baseline.  I discussed that the care that is needed for this patient will take money whether the care is in the home or in a facility.  Belle said there are 7 kids and she said that 3 are caring for this patient. She said she will call her sisters and the others can pay for a facility if that's what we need.  She asked for some time to make a call.  I left my call back number for Belle.    I spoke with [facility] and they can accept this patient back and the family has to understand that if this patient doesn't get auth from insurance that the family will need to pay around $5,000 up front.  I spoke with Belle this patient's daughter and she agrees for this patient to return to [facility], she agrees to private pay if [employer] doesn't auth and she agrees to a stretcher transport to [facility].  I gave [facility] hospital bed number as Belle wants to keep this patient at home and care for her mom.  [facility].  Belle would like to keep this patient in her home as do 2 of her other sisters (instead of LTC facility).  I explained to her to get the equipment as soon as possible.  Belle agreed. I had faxed the facesheet, notes, labs, xrays, orders, med list and PAS to [facility].  I also put that paperwork in an envelop to send with this patient.  Stretcher ride is arranged with   at around 5 pm.  I let [facility] know of the ETA.  I have updated the staff of the discharge plan. I left the packet with the RN to send with this patient.     Pt/family was provided with the Medicare Compare list for SNF.  Discussed associated Medicare star ratings to assist with choice for referrals/discharge planning Yes    Education was given to pt/family that star ratings are updated/maintained by Medicare and can be reviewed by visiting www.medicare.gov Yes    PAS-RR   ?   D: Per DHS regulation, SW completed and submitted PAS-RR to [place] Board on Aging Direct Connect via the Senior LinkAge Line. PAS-RR confirmation # is : [id number]  I: CC spoke with patient's daughter Belle and she is aware a PAS-RR has been submitted. CC reviewed with Belle that she may be contacted for a follow up appointment within 10 days of hospital discharge if their SNF stay is < 30 days. Contact information for [facility] LinkAge Line was also provided.   A: Belle verbalized understanding.   P: Further questions may be directed to [facility] LinkAge Line at #[phone], option #4 for PAS-RR staff.

## 2020-10-22 NOTE — ED NOTES
Bed: ED11  Expected date:   Expected time:   Means of arrival:   Comments:  [facility] - 89 F weakness UTI no covid eta 123

## 2020-10-22 NOTE — PHARMACY-ADMISSION MEDICATION HISTORY
Pharmacy Medication History  Admission medication history interview status for the 10/22/2020  admission is complete. See EPIC admission navigator for prior to admission medications       Medication history sources: Patient's family/friend (daughter Belle), Surescripts and Care Everywhere  Location of interview: {Phone,  Medication history source reliability: Good  Adherence assessment: Good    Significant changes made to the medication list:  Added Glipizide, Donepezil increased to 10mg starting today, added Ocuvite, and Aleve       Additional medication history information:       Medication reconciliation completed by provider prior to medication history? No    Time spent in this activity: 15 min      Prior to Admission medications    Medication Sig Last Dose Taking? Auth Provider   acetaminophen (TYLENOL) 325 MG tablet Take 650 mg by mouth every 4 hours as needed (hip pain) Past Week at Unknown time Yes Unknown, Entered By History   aspirin 81 MG EC tablet Take 81 mg by mouth 2 times daily 10/22/2020 at Unknown time Yes Unknown, Entered By History   calcium citrate-vitamin D (CITRACAL) 315-200 MG-UNIT TABS per tablet Take 1 tablet by mouth daily 10/22/2020 at Unknown time Yes Unknown, Entered By History   donepezil (ARICEPT) 10 MG tablet Take 10 mg by mouth every morning 10/22/2020 at Unknown time Yes Unknown, Entered By History   glipiZIDE (GLUCOTROL) 5 MG tablet Take 2.5 mg by mouth 2 times daily (with meals) 10/22/2020 at Unknown time Yes Unknown, Entered By History   losartan (COZAAR) 50 MG tablet Take 50 mg by mouth At Bedtime 10/21/2020 at Unknown time Yes Unknown, Entered By History   lovastatin (MEVACOR) 40 MG tablet TAKE 1 TABLET BY MOUTH EVERYDAY AT BEDTIME  Patient taking differently: Take 40 mg by mouth At Bedtime TAKE 1 TABLET BY MOUTH EVERYDAY AT BEDTIME 10/21/2020 at Unknown time Yes Waleska Cervantes APRN CNP   melatonin 3 MG tablet Take 6 mg by mouth At Bedtime 10/21/2020 at Unknown time Yes  Unknown, Entered By History   multivitamin  with lutein (OCUVITE WITH LTEIN) CAPS per capsule Take 1 capsule by mouth daily 10/22/2020 at Unknown time Yes Unknown, Entered By History   multivitamin (CENTRUM SILVER) tablet Take 1 tablet by mouth daily 10/22/2020 at Unknown time Yes Unknown, Entered By History   naproxen sodium (ANAPROX) 220 MG tablet Take 220 mg by mouth 2 times daily as needed for moderate pain 10/21/2020 at Unknown time Yes Unknown, Entered By History   vitamin D3 (CHOLECALCIFEROL) 50 mcg (2000 units) tablet Take 1 tablet by mouth 2 times daily 10/22/2020 at Unknown time Yes Unknown, Entered By History   QUEtiapine (SEROQUEL) 12.5 mg TABS half-tab Take 12.5 mg by mouth daily as needed not started  Reported, Patient

## 2020-10-22 NOTE — ED PROVIDER NOTES
History     Chief Complaint:  Generalized Weakness       History limited by: dementia       Reanna Garza is a 89 year old female with a history of CVA, diabetes, dementia, hypertension, and hyperlipidemia who presents with generalized weakness. The patient was recently in TCU from 10/4/20-10/17/20 after she was hospitalized for a fall from 10/1/20-10/4/20. The patient is currently living at home with her daughter.The patient's family called 911 today due to increasing weakness, incontinence, and confusion. Here, she denies having any pain or headache.     Allergies:  Codeine  Codeine Sulfate  Penicillins  Sulfa Drugs     Medications:    Aspirin   Donepezil   Losartan   Lovastatin   seroquel     Past Medical History:    Abdominal aneurysm without mention of rupture   Arthritis   CVA (cerebral infarction)   DJD (degenerative joint disease)   Hyperlipidemia  Hypertension   Stroke   Type 2 diabetes  Vascular dementia  Vitamin D deficiency   Lumbar spondylosis     Past Surgical History:    Blepharoplasty   Cholecystoenterostomy   Total abdominal hysterectomy   Total knee arthroplasty   Shoulder arthroscopy   Replacement socket, shoulder disarticulation disarticulation/interscapular thoracic    Family History:    Cancer     Social History:  Smoking Status: former  Smokeless Tobacco: Never Used  Alcohol Use: Yes  Drug Use: No  PCP: Chelita Curtis     Review of Systems   Unable to perform ROS: Dementia         Physical Exam     Patient Vitals for the past 24 hrs:   BP Temp Temp src Pulse Resp SpO2   10/22/20 1530 (!) 158/71 -- -- 61 -- --   10/22/20 1510 -- -- -- -- -- 98 %   10/22/20 1500 (!) 149/66 -- -- 59 -- --   10/22/20 1430 (!) 143/65 -- -- 60 -- 98 %   10/22/20 1420 -- -- -- -- -- 96 %   10/22/20 1410 (!) 147/72 -- -- 60 -- 97 %   10/22/20 1340 -- -- -- -- -- 97 %   10/22/20 1330 138/61 -- -- 60 -- 96 %   10/22/20 1258 (!) 150/70 98.1  F (36.7  C) Oral 54 16 97 %        Physical  Exam  General: Resting comfortably on the gurney.  He has no complaints  Head:  The scalp, face, and head appear normal  Eyes:  The pupils are equal, round, and reactive to light    There is no nystagmus    Extraocular muscles are intact    Conjunctivae and sclerae are normal  ENT:    The nose is normal    Pinnae are normal    The oropharynx is normal    Uvula is in the midline  Neck:  Normal range of motion    There is no rigidity noted    There is no midline cervical spine pain/tenderness    Trachea is in the midline    No mass is detected  CV:  Regular rate and underlying rhythm     Normal S1/S2, no S3/S4    No pathological murmur detected  Resp:  Lungs are clear    There is no tachypnea    Non-labored    No rales    No wheezing   GI:  Abdomen is soft, there is no rigidity    Healed abdominal surgical scar in the right upper quadrant    No distension    No tympani    No rebound tenderness     Non-surgical without peritoneal features  MS:  Normal muscular tone    Symmetric motor strength    No major joint effusions    No asymmetric leg swelling, no calf tenderness  Skin:  No rash or acute skin lesions noted  Neuro: There is cognitive decline.  The patient know she is at the hospital.  She does not know the name.  She was not oriented to the day of week.  She did recognize the time of day.  She is able to follow commands.  She can lift all extremities off the bed.  She does not normally ambulate without assistive devices and often uses a wheelchair.  Psych:  Awake. Alert.      Normal affect.  Appropriate interactions.  Lymph: No anterior cervical lymphadenopathy noted              Emergency Department Course     Imaging:  Radiology findings were communicated with the patient who voiced understanding of the findings.    Head CT w/o contrast  Final Result  IMPRESSION:  Diffuse cerebral volume loss and cerebral white matter  changes consistent with chronic small vessel ischemic disease. No  evidence for acute  intracranial pathology.  Radiation dose for this scan was reduced using automated exposure  control, adjustment of the mA and/or kV according to patient size, or  iterative reconstruction technique.  ANITA NAILS MD  Reading per radiology       Laboratory:  Laboratory findings were communicated with the patient who voiced understanding of the findings.    CBC:  WBC 6.2, HGB 11.0 (L), , o/w WNL     BMP: AWNL (Creatinine 0.72)     UA with micro: Urine Blood trace (A) Leukocyte esterase urine small (A) RBC/HPF 9 (H)  o/w wnl/negative       Urine culture: pending     Asymptomatic COVID-19 Virus (Coronavirus) by PCR Nasopharyngeal swab: pending      Interventions:      Emergency Department Course:  Past medical records, nursing notes, and vitals reviewed.    1333 I performed an exam of the patient as documented above.    IV was inserted and blood was drawn for laboratory testing, results above.     The patient was sent for imaging while in the emergency department, results above.      The patient provided a urine sample here in the emergency department. This was sent for laboratory testing, findings above.      3:52 PM  Patient rechecked and updated.           Impression & Plan   Covid-19  Reanna Garza was evaluated during a global COVID-19 pandemic, which necessitated consideration that the patient might be at risk for infection with the SARS-CoV-2 virus that causes COVID-19.   Applicable protocols for evaluation were followed during the patient's care.   COVID-19 was considered as part of the patient's evaluation. The plan for testing is:  a test was obtained during this visit.    Medical Decision Making:  Reanna Garza is a 89 year old female who presents to the emergency department today with general weakness.  She has had some falls.  She just got out of the transitional care unit and she apparently uses a wheelchair at baseline.  The patient has no acute complaints.  She is not in pain.  She may  have struck her head in one of the falls.  CT scan is negative.  Blood work is nonacute and the urinalysis is also nonacute.  Family was contacted.  The patient is generally declining.  She does have deconditioning.  She will be returned to the transitional care unit at this time.  No life-threatening etiologies are detected at this moment.    With the care manager, we placed all of the necessary medication and TCU orders for this patient.  She will be transferred to [facility] transitional care unit via ambulance when a rig is available.    She will be set up for blood glucose monitoring, physical therapy and occupational therapy and general support.    Discharge Diagnosis:    ICD-10-CM    1. Generalized muscle weakness  M62.81 Urine Culture       Disposition:  Discharged to a transitional care center.    Discharge Medications:  New Prescriptions    No medications on file       Scribe Disclosure:  Brenden TRUJILLO, am serving as a scribe at 1:33 PM on 10/22/2020 to document services personally performed by Dmitriy Fajardo MD based on my observations and the provider's statements to me.      10/22/2020   Dmitriy Fajardo MD Rock, Michael P, MD  10/22/20 7054

## 2020-10-22 NOTE — ED TRIAGE NOTES
Increased weakness, incontinence this week.  Was recently in TCU and returned home.  Family has called 911 for lift assist x 5 this week.  Today medics called for weakness, incontinence, increased confusion.  ABCs intact.

## 2020-10-23 NOTE — LETTER
"    10/23/2020        RE: Reanna Garza  [address] [place] [id number]        Telephone visit  [facility]  Reanna Garza is a 89 year old year old adult who is being evaluated via a billable telephone visit due to the restrictions of the Covid-19 pandemic. The patient has been notified of following: \"This telephone visit will be conducted via a call between you and your provider. We have found that certain health care needs can be provided without the need for a physical exam. This service lets us provide the care you need with a short phone conversation. If a prescription is necessary we will work with the facility you are at and can send it directly to your pharmacy. If lab work is needed we can place an order to have it drawn at the facility you are at. If during the course of the call the provider feels a telephone visit is not appropriate, you will not be charged for this service.\"   Patient or Patient's first contact has given verbal consent for Telephone visit?  Yes  Place of Location at the time of visit: [facility]     Reanna Garza complains of :   Chief Complaint   Patient presents with     Hospital F/U     I have reviewed and updated the patient's Past Medical History, Social History, Family History and Medication List.  ALLERGIES:   Allergies   Allergen Reactions     Codeine      Hives       Codeine Sulfate Hives     Penicillins Hives     Sulfa Drugs Unknown      HPI: HPI information obtained from: facility chart records, facility staff and patient report.      Patient Reanna Garza is 89 yr old female admitted to [facility] for rehabilitation s/p hospitalization ER visit 10/22/20 for generalized weakness with falls. Has urinary incontinence. UA/UC negative  Recent hospitalization for fall 10/1-10/4/20  Lives with daughter. Walks with walker    PMHx frequent fall, dementia, hypertension, hyperlipidemia, hx CVA with " left sided weakness, non-insulin-dependent type 2 diabetes, DJD, abdominal aneurysm without rupture, RLS, macular degeneration and lumbar spondylolysis    TODAY    Patient states she feels fine with no concerns;denies pain, states she participating in therapies; patient has confusion (unable to state where she is), pleasantly confused; reports mood good and she is sleeping, denies shortness of breath or chest pain and reports regular elimination  Vitals stable    Code status FULL code    MEDICATIONS:    Current Outpatient Medications   Medication Sig Dispense Refill     acetaminophen (TYLENOL) 325 MG tablet Take 650 mg by mouth every 4 hours as needed (hip pain)       aspirin 81 MG EC tablet Take 81 mg by mouth 2 times daily       calcium citrate-vitamin D (CITRACAL) 315-200 MG-UNIT TABS per tablet Take 1 tablet by mouth daily       donepezil (ARICEPT) 10 MG tablet Take 10 mg by mouth every morning       glipiZIDE (GLUCOTROL) 5 MG tablet Take 2.5 mg by mouth 2 times daily (with meals)       losartan (COZAAR) 50 MG tablet Take 50 mg by mouth At Bedtime       lovastatin (MEVACOR) 40 MG tablet Take 40 mg by mouth At Bedtime       melatonin 3 MG tablet Take 6 mg by mouth At Bedtime       multivitamin  with lutein (OCUVITE WITH LTEIN) CAPS per capsule Take 1 capsule by mouth daily       multivitamin (CENTRUM SILVER) tablet Take 1 tablet by mouth daily       naproxen sodium (ANAPROX) 220 MG tablet Take 220 mg by mouth 2 times daily as needed for moderate pain       QUEtiapine (SEROQUEL) 12.5 mg TABS half-tab Take 12.5 mg by mouth daily as needed       vitamin D3 (CHOLECALCIFEROL) 50 mcg (2000 units) tablet Take 1 tablet by mouth 2 times daily       REVIEW OF SYSTEMS: 10 point ROS of systems including Constitutional, Eyes, Respiratory, Cardiovascular, Gastroenterology, Genitourinary, Integumentary, Musculoskeletal, Psychiatric were all negative except for pertinent positives noted in my HPI.  Objective: /68   Pulse  "52   Temp 97.6  F (36.4  C)   Resp 18   Ht 1.626 m (5' 4\")   Wt 70.3 kg (155 lb)   SpO2 96%   BMI 26.61 kg/m    Limited visit exam done given COVID-19 precautions.     Labs:   Labs done in SNF are in [facility]. Please refer to them using EPIC/Care Everywhere.  Assessment/Plan:    Weakness, history of falls  History hip pain  Pain controlled with tylenol; avoid NSAIDs if able due to side effect profile  Continue therapies;ambulates with walker at baseline   May need more help in home or more supportive setting.  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 Saw Neurologist 8/28 - pt with memory problems, worse with hyperglycemia. Stroke x5 yrs ago with left sided weakness. Memory problem progressed after stroke. Sundowning at home, more confused, legs give out at times, talking to people not there, and confused about family members. Hallucinations for the past 2-3 years. Verbally aggressive at times. Required 24 hour care at home, patient typically able to do her own ADLs.  - Tends to call out for Belle (daughter) and Dad (her ), needs to be reassured that she will see them soon and reoriented. More irritated in the evening.   Continue Aricept  Has as needed Seroquel for agitation, wean off as able  Continue Melatonin for sleep  Monitor   History CVA with left sided weakness  Continue aspirin   HLD  Continue Lovastatin  Hypertension  chronic managed   Continue losartan 50 mg, monitor       Insulin-dependent type 2 diabetes, well controlled  Last Hgb A1c 5.4% 10/1/20.  Continue low dose Glipizide  Monitor blood sugar        Phone call duration:  21 minutes  MARGARITA Boucher CNP                   Sincerely,        MARGARITA Boucher CNP      "

## 2020-10-23 NOTE — PROGRESS NOTES
"Telephone visit  [facility]  Reanna Garza is a 89 year old year old adult who is being evaluated via a billable telephone visit due to the restrictions of the Covid-19 pandemic. The patient has been notified of following: \"This telephone visit will be conducted via a call between you and your provider. We have found that certain health care needs can be provided without the need for a physical exam. This service lets us provide the care you need with a short phone conversation. If a prescription is necessary we will work with the facility you are at and can send it directly to your pharmacy. If lab work is needed we can place an order to have it drawn at the facility you are at. If during the course of the call the provider feels a telephone visit is not appropriate, you will not be charged for this service.\"   Patient or Patient's first contact has given verbal consent for Telephone visit?  Yes  Place of Location at the time of visit: [facility]     Reanna Garza complains of :   Chief Complaint   Patient presents with     Hospital F/U     I have reviewed and updated the patient's Past Medical History, Social History, Family History and Medication List.  ALLERGIES:   Allergies   Allergen Reactions     Codeine      Hives       Codeine Sulfate Hives     Penicillins Hives     Sulfa Drugs Unknown      HPI: HPI information obtained from: facility chart records, facility staff and patient report.      Patient Reanna Garza is 89 yr old female admitted to [facility] for rehabilitation s/p hospitalization ER visit 10/22/20 for generalized weakness with falls. Has urinary incontinence. Pending UC results  Recent hospitalization for fall 10/1-10/4/20  Lives with daughter. Walks with walker    PMHx frequent fall, dementia, hypertension, hyperlipidemia, hx CVA with left sided weakness, non-insulin-dependent type 2 diabetes, DJD, abdominal aneurysm without " "rupture, RLS, macular degeneration and lumbar spondylolysis    TODAY    Patient states she feels fine with no concerns;denies pain, states she participating in therapies; patient has confusion (unable to state where she is), pleasantly confused; reports mood good and she is sleeping, denies shortness of breath or chest pain and reports regular elimination  Vitals stable    Code status FULL code    MEDICATIONS:    Current Outpatient Medications   Medication Sig Dispense Refill     acetaminophen (TYLENOL) 325 MG tablet Take 650 mg by mouth every 4 hours as needed (hip pain)       aspirin 81 MG EC tablet Take 81 mg by mouth 2 times daily       calcium citrate-vitamin D (CITRACAL) 315-200 MG-UNIT TABS per tablet Take 1 tablet by mouth daily       donepezil (ARICEPT) 10 MG tablet Take 10 mg by mouth every morning       glipiZIDE (GLUCOTROL) 5 MG tablet Take 2.5 mg by mouth 2 times daily (with meals)       losartan (COZAAR) 50 MG tablet Take 50 mg by mouth At Bedtime       lovastatin (MEVACOR) 40 MG tablet Take 40 mg by mouth At Bedtime       melatonin 3 MG tablet Take 6 mg by mouth At Bedtime       multivitamin  with lutein (OCUVITE WITH LTEIN) CAPS per capsule Take 1 capsule by mouth daily       multivitamin (CENTRUM SILVER) tablet Take 1 tablet by mouth daily       naproxen sodium (ANAPROX) 220 MG tablet Take 220 mg by mouth 2 times daily as needed for moderate pain       QUEtiapine (SEROQUEL) 12.5 mg TABS half-tab Take 12.5 mg by mouth daily as needed       vitamin D3 (CHOLECALCIFEROL) 50 mcg (2000 units) tablet Take 1 tablet by mouth 2 times daily       REVIEW OF SYSTEMS: 10 point ROS of systems including Constitutional, Eyes, Respiratory, Cardiovascular, Gastroenterology, Genitourinary, Integumentary, Musculoskeletal, Psychiatric were all negative except for pertinent positives noted in my HPI.  Objective: /68   Pulse 52   Temp 97.6  F (36.4  C)   Resp 18   Ht 1.626 m (5' 4\")   Wt 70.3 kg (155 lb)   SpO2 " 96%   BMI 26.61 kg/m    Limited visit exam done given COVID-19 precautions.     Labs:   Labs done in SNF are in [facility]. Please refer to them using [facility]/Care Everywhere.  Assessment/Plan:    Weakness, history of falls  History hip pain  Pain controlled with tylenol; avoid NSAIDs if able due to side effect profile  Continue therapies;ambulates with walker at baseline   May need more help in home or more supportive setting.  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 Saw Neurologist 8/28 - pt with memory problems, worse with hyperglycemia. Stroke x5 yrs ago with left sided weakness. Memory problem progressed after stroke. Sundowning at home, more confused, legs give out at times, talking to people not there, and confused about family members. Hallucinations for the past 2-3 years. Verbally aggressive at times. Required 24 hour care at home, patient typically able to do her own ADLs.  - Tends to call out for Belle (daughter) and Dad (her ), needs to be reassured that she will see them soon and reoriented. More irritated in the evening.   Continue Aricept  Has as needed Seroquel for agitation, wean off as able  Continue Melatonin for sleep  Monitor   History CVA with left sided weakness  Continue aspirin   HLD  Continue Lovastatin  Hypertension  chronic managed   Continue losartan 50 mg, monitor       Insulin-dependent type 2 diabetes, well controlled  Last Hgb A1c 5.4% 10/1/20.  Continue low dose Glipizide  Monitor blood sugar     urinary tract infection  UC results >100, 000   Enterococcus faecalis  Will start Macrobid 100 mg 2 x daily x 7 days        Phone call duration:  21 minutes  MARGARITA Boucher CNP

## 2020-10-28 NOTE — PROGRESS NOTES
[facility]  [facility] Record Number:  [id number]  Place of Service where encounter took place:  [facility] ([facility]) [[id number]]  Chief Complaint   Patient presents with     Nursing Home Acute       HPI:    Reanna Garza  is a 89 year old (12/25/1927), who is being seen today for an episodic care visit.  HPI information obtained from: facility chart records, facility staff and patient report. Today's concern is:      Patient Reanna Garza is 89 yr old female admitted to [facility] for rehabilitation s/p hospitalization ER visit 10/22/20 for generalized weakness with falls. Has urinary incontinence. Pending UC results  Recent hospitalization for fall 10/1-10/4/20  Lives with daughter. Walks with walker    PMHx frequent fall, dementia, hypertension, hyperlipidemia, hx CVA with left sided weakness, non-insulin-dependent type 2 diabetes, DJD, abdominal aneurysm without rupture, RLS, macular degeneration and lumbar spondylolysis         Fall, sequela  Generalized muscle weakness  Vascular dementia without behavioral disturbance (H)  Urinary tract infection without hematuria, site unspecified    Variable progress in therapies  2 assist with transfers at times  Therapies report  Ambulation: 3 ft Max   Sit to Stand: Max assist of 2   Transfers: Max assist of 2   Bed Mobility: Max assist of 2   UB Dressing: Mod   LB Dressing: Max   Toileting: Max   Grooming/Hygiene: Min    Difficulty with participation due to dementia and ability to follow direction; cognition wax and wane  Patient irritable at times and not participate or follow directions with cares or therapies  Vitals and labs stable; blood pressure elevate at times, monitor trend  Remains on Macrobid for urinary tract infection  Variable intake at times ~%; patient is on glipizide; blood sugar stable        Past Medical and Surgical History reviewed in Epic today.    MEDICATIONS:    Current  "Outpatient Medications   Medication Sig Dispense Refill     acetaminophen (TYLENOL) 325 MG tablet Take 650 mg by mouth every 4 hours as needed (hip pain)       aspirin 81 MG EC tablet Take 81 mg by mouth 2 times daily       calcium citrate-vitamin D (CITRACAL) 315-200 MG-UNIT TABS per tablet Take 1 tablet by mouth daily       donepezil (ARICEPT) 10 MG tablet Take 10 mg by mouth every morning       glipiZIDE (GLUCOTROL) 5 MG tablet Take 2.5 mg by mouth 2 times daily (with meals)       losartan (COZAAR) 50 MG tablet Take 50 mg by mouth At Bedtime       lovastatin (MEVACOR) 40 MG tablet Take 40 mg by mouth At Bedtime       melatonin 3 MG tablet Take 6 mg by mouth At Bedtime       multivitamin  with lutein (OCUVITE WITH LTEIN) CAPS per capsule Take 1 capsule by mouth daily       multivitamin (CENTRUM SILVER) tablet Take 1 tablet by mouth daily       naproxen sodium (ANAPROX) 220 MG tablet Take 220 mg by mouth 2 times daily as needed for moderate pain       nitroFURantoin macrocrystal-monohydrate (MACROBID) 100 MG capsule Take 100 mg by mouth 2 times daily       QUEtiapine (SEROQUEL) 12.5 mg TABS half-tab Take 12.5 mg by mouth daily as needed       vitamin D3 (CHOLECALCIFEROL) 50 mcg (2000 units) tablet Take 1 tablet by mouth 2 times daily           REVIEW OF SYSTEMS:  4 point ROS including Respiratory, CV, GI and , other than that noted in the HPI,  is negative    Objective:  BP (!) 167/78   Pulse 59   Temp 97.5  F (36.4  C)   Resp 20   Ht 1.626 m (5' 4\")   Wt 70 kg (154 lb 6.4 oz)   SpO2 94%   BMI 26.50 kg/m    Exam:  GENERAL APPEARANCE:  Alert  ENT:  Mouth and posterior oropharynx normal, moist mucous membranes  EYES:  EOM, conjunctivae, lids, pupils and irises normal  NECK:  No adenopathy,masses or thyromegaly  RESP:  no respiratory distress  M/S:   sitting up in bed  SKIN:  Inspection of skin and subcutaneous tissue baseline  NEURO:   Cranial nerves 2-12 are normal tested and grossly at patient's " baseline  PSYCH:  memory impaired     Labs:   Labs done in SNF are in [place] [facility]. Please refer to them using EPIC/Care Everywhere.    ASSESSMENT/PLAN:     Fall, sequela  Generalized muscle weakness  Vascular dementia without behavioral disturbance (H)  Urinary tract infection without hematuria, site unspecified     Variable participation in therapies due to dementia  Needing 2 assist at times  Likely needs long term care or more help in home  Continue course of antibiotic for urinary tract infection, monitor   Continue therapies at this time;  involved in safe plan home  Monitor blood pressure and blood sugar, may need further adjustments  Monitor intake      Electronically signed by:  MARGARITA Boucher CNP

## 2020-11-04 NOTE — PROGRESS NOTES
[place] [facility]  [place] Medical Record Number:  [id number]  Place of Service where encounter took place:  [facility] ([facility]) [[id number]]  Chief Complaint   Patient presents with     Nursing Home Acute       HPI:    Reanna Garza  is a 89 year old (12/25/1927), who is being seen today for an episodic care visit.  HPI information obtained from: facility chart records, facility staff and patient report. Today's concern is:    Patient Reanna Garza is 89 yr old female admitted to [facility] for rehabilitation s/p hospitalization ER visit 10/22/20 for generalized weakness with falls. Has urinary incontinence. Pending UC results  Recent hospitalization for fall 10/1-10/4/20  Lives with daughter. Walks with walker    PMHx frequent fall, dementia, hypertension, hyperlipidemia, hx CVA with left sided weakness, non-insulin-dependent type 2 diabetes, DJD, abdominal aneurysm without rupture, RLS, macular degeneration and lumbar spondylolysis       Generalized muscle weakness  Vascular dementia without behavioral disturbance (H)  Urinary tract infection without hematuria, site unspecified  Clinical diagnosis of COVID-19     patient has decreased oral intake and weights trend down slightly; 10/31/20 150#  10/4/20  158#  7/19/20 162#.   Staff now assisting patient with feeding, patient started on supplements and dietary involved  Patient came back positive for COVID19 today; she denies feeling ill. She does states she would like to go home  She has variable participation in therapies and is mostly wheelchair bound; daughter looking into getting EZ stand for home    Completed course of antibiotic Macrobid, denies signs and symptoms urinary tract infection         Past Medical and Surgical History reviewed in Epic today.    MEDICATIONS:    Current Outpatient Medications   Medication Sig Dispense Refill     acetaminophen (TYLENOL) 325 MG tablet Take 650 mg by mouth every 4  "hours as needed (hip pain)       aspirin 81 MG EC tablet Take 81 mg by mouth 2 times daily       calcium citrate-vitamin D (CITRACAL) 315-200 MG-UNIT TABS per tablet Take 1 tablet by mouth daily       donepezil (ARICEPT) 10 MG tablet Take 10 mg by mouth every morning       glipiZIDE (GLUCOTROL) 5 MG tablet Take 2.5 mg by mouth 2 times daily (with meals)       losartan (COZAAR) 50 MG tablet Take 50 mg by mouth At Bedtime       lovastatin (MEVACOR) 40 MG tablet Take 40 mg by mouth At Bedtime       melatonin 3 MG tablet Take 6 mg by mouth At Bedtime       multivitamin  with lutein (OCUVITE WITH LTEIN) CAPS per capsule Take 1 capsule by mouth daily       multivitamin (CENTRUM SILVER) tablet Take 1 tablet by mouth daily       vitamin D3 (CHOLECALCIFEROL) 50 mcg (2000 units) tablet Take 1 tablet by mouth 2 times daily           REVIEW OF SYSTEMS:  10 point ROS of systems including Constitutional, Eyes, Respiratory, Cardiovascular, Gastroenterology, Genitourinary, Integumentary, Musculoskeletal, Psychiatric were all negative except for pertinent positives noted in my HPI.    Objective:  /69   Pulse 58   Temp 97.1  F (36.2  C)   Resp 18   Ht 1.626 m (5' 4\")   Wt 68 kg (150 lb)   SpO2 94%   BMI 25.75 kg/m    Exam:  GENERAL APPEARANCE:  Alert, in no distress  ENT:  Mouth and posterior oropharynx normal, moist mucous membranes  EYES:  EOM, conjunctivae, lids, pupils and irises normal  NECK:  No adenopathy,masses or thyromegaly  RESP:  no respiratory distress  M/S:   lying in bed  SKIN:  Inspection of skin and subcutaneous tissue baseline  NEURO:   Cranial nerves 2-12 are normal tested and grossly at patient's baseline  PSYCH:  memory impaired     Labs:   Labs done in SNF are in [place] EPIC. Please refer to them using EPIC/Care Everywhere.    ASSESSMENT/PLAN:     Generalized muscle weakness  Vascular dementia without behavioral disturbance (H)  Urinary tract infection without hematuria, site unspecified  Clinical " diagnosis of COVID-19    Continue therapies at this time. Poor progress- she is primarily wheelchair bound and feeder; concern for progression of dementia   involved in safe plan home. Daughter wanting to take patient home when complete TCU stay -with homecare & EZ stand per      Completed course of antibiotic for urinary tract infection, no signs and symptoms urinary tract infection at this time    Diagnosed with Covid19 today, has decreased appetite at baseline; no acute clinical signs and symptoms COVID19, will monitor     Med review: discontinue Seroquel and Naproxen due to not use  Discontinue calcium and vitamin D; patient wheelchair bound therapies report        Electronically signed by:  MARGARITA Boucher CNP

## 2020-11-09 NOTE — TELEPHONE ENCOUNTER
Caroline from [facility] is calling asking if you would be the attending provider for patient for willing to sigh hospice order's and death certificate at appropriate time.  Patient has been diagnosed with covid-19    Last seen May 2019-Looks like she was at a nursing home and was going to go home now with Daughter.

## 2020-11-09 NOTE — TELEPHONE ENCOUNTER
"Marcella (daughter) is calling. Consent to communicate on file. Reports her mother was recently in the hospital and discharged into [facility] TCU for therapy and helping her mother regain her strength. Reports now the patient has Covid, since being in the TCU. Caller reports she went to see her mother through the window and reports her mother was looking sedated, her mother did \"Stick her tongue out\" at the caller. But did fall asleep in the wheel chair. Caller reports she is concerned about her mothers blood sugar. Caller reports she called the charge nurse at the TCU, and was told the nurse practitioner took patient off of her diabetes medications Glipizide and stopped her blood sugar checks. Caller is frustrated that no one has communicated this to her and given her reasons why her mother is being taken off medications. Caller wanted patient's blood sugar checked because of how she was acting and the nurse at the TCU told caller they were unable to check it because they didn't have an order.     Advised to call back the TCU, and speak with charge nurse or supervisor regarding her concerns again, and request that an on call provider be called to get an order to check the patient's blood sugar. Caller states the last time she called them, the charge offered to have the nurse practitioner call the daughter. Advised if she is concerned about her mothers overall care at the facility, ask to speak with the nurse practitioner or MD.     Caller verbalized understanding and had no further questions.     Baylee Saba RN/[facility]      Additional Information    Negative: [1] Caller is not with the adult (patient) AND [2] reporting urgent symptoms    Negative: Lab result questions    Negative: Medication questions    Negative: Caller can't be reached by phone    Negative: Caller has already spoken to PCP or another triager    Negative: RN needs further essential information from " caller in order to complete triage    Negative: Requesting regular office appointment    Negative: [1] Caller requesting NON-URGENT health information AND [2] PCP's office is the best resource    Health Information question, no triage required and triager able to answer question    Protocols used: INFORMATION ONLY CALL-A-AH

## 2020-11-10 NOTE — TELEPHONE ENCOUNTER
I called and said I'd be happy to help anyway I could, I can certainly sign hospice orders and others forms as needed.  Sincerely,  Dr. Chelita Curtis MD  11/10/2020

## 2020-11-11 NOTE — PROGRESS NOTES
[place] [facility] DISCHARGE SUMMARY  PATIENT'S NAME: Reanna Garza  YOB: 1927  MEDICAL RECORD NUMBER:  [id number]  Place of Service where encounter took place:  [facility] ([facility]) [[id number]]    PRIMARY CARE PROVIDER AND CLINIC RESPONSIBLE AFTER TRANSFER:   Chelita Curtis MD, [address] / [facility] [address]    [facility] Provider     Transferring providers: MARGARITA Boucher CNP; Solitario Webster MD  Recent Hospitalization/ED:  [facility] Hospital stay 10/1/20 to 10/04/20.  Date of SNF Admission: October / 04 / 2020  Date of SNF (anticipated) Discharge: November / 17 / 2020  Discharged to: with family HOME  Cognitive Scores/Physical Function/DME:    Sit to Stand: Mod x2   Standing: Mod-Max Ax2   Transfers: Mod-Max Ax2 (EZ-stand)   Bed Mobility: Mod-Max Ax2   UB Dressing: Mod A   LB Dressing: Max A (Ax2)   Toileting: Max A  6 steps in parallel bars    CODE STATUS/ADVANCE DIRECTIVES DISCUSSION:  Full Code   ALLERGIES: Codeine, Codeine sulfate, Penicillins, and Sulfa drugs    DISCHARGE DIAGNOSIS/NURSING FACILITY COURSE:     Patient Reanna Garza is 89 yr old female admitted to [facility] for rehabilitation s/p hospitalization ER visit 10/22/20 for generalized weakness with falls. Has urinary incontinence. Positive UC  Recent hospitalization for fall 10/1-10/4/20  Lives with daughter  EDYID19 positive on TCU 11/3/20 and asymptomatic except worsening decrease appetite    PMHx frequent fall, dementia, hypertension, hyperlipidemia, hx CVA with left sided weakness, non-insulin-dependent type 2 diabetes, DJD, abdominal aneurysm without rupture, RLS, macular degeneration and lumbar spondylolysis      Weakness, history of falls  History hip pain  No c/o pain, continue as needed Tylenol  Participation in therapies variable due to cognition  wheelchair bound; decline from prior where patient used to ambulate. Decline present  prior to COVID19  Patient needs to be fed and has had progressive weight loss. 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 Saw Neurologist 8/28 - pt with memory problems, worse with hyperglycemia. Stroke x5 yrs ago with left sided weakness. Memory problem progressed after stroke. Sundowning at home, more confused, legs give out at times, talking to people not there, and confused about family members. Hallucinations for the past 2-3 years. Verbally aggressive at times. Required 24 hour care at home,  Overall physical and mental decline; continue Aricept and Melatonin for dementia/sundowning    History CVA with left sided weakness  Continue aspirin, lower dose 81mg daily  HLD  Discontinue Lovastatin due to likely not benefit  Hypertension  blood pressure managed;   Losartan stopped due to hypotension and goals of care    COVID19 positive  No clinical signs and symptoms other then perhaps decreased appetite, however, had decreased appetite prior to COVID19  Complete 14 day course quarantine at discharge      type 2 diabetes, well controlled  Last Hgb A1c 5.4% 10/1/20  Monitor blood sugar,   Stop glipizide if blood sugar trend low; family would like to continue Glipizide at this time and risk hypoglycemia reviewed given patient decreased appetite    urinary tract infection  UC results >100, 000 Enterococcus faecalis  Completed course Macrobid 100 mg 2 x daily x 7 days and clinically resolved           Past Medical History:  has a past medical history of Abdominal aneurysm without mention of rupture, Arthritis, CVA (cerebral infarction), DJD (degenerative joint disease), Hyperlipidemia LDL goal <130, Hypertension goal BP (blood pressure) < 140/90, Low back pain, Right hip pain, Stroke (H), and Type 2 diabetes, HbA1C  "goal < 8% (H).    Discharge Medications:    Current Outpatient Medications   Medication Sig Dispense Refill     acetaminophen (TYLENOL) 325 MG tablet Take 650 mg by mouth every 4 hours as needed (hip pain)       aspirin 81 MG EC tablet Take 81 mg by mouth daily        donepezil (ARICEPT) 10 MG tablet Take 10 mg by mouth every morning       GLIPIZIDE PO Take 2.5 mg by mouth 2 times daily (before meals)       melatonin 3 MG tablet Take 6 mg by mouth At Bedtime         Medication Changes/Rationale:         Controlled medications sent with patient:   not applicable/none     ROS:   4 point ROS including Respiratory, CV, GI and , other than that noted in the HPI,  is negative    Physical Exam:   Vitals: /57   Pulse 51   Temp 97.9  F (36.6  C)   Resp 18   Ht 1.626 m (5' 4\")   Wt 67.4 kg (148 lb 11.2 oz)   SpO2 96%   BMI 25.52 kg/m    BMI= Body mass index is 25.52 kg/m .  GENERAL APPEARANCE:  Alert, in no distress     SNF labs: Labs done in SNF are in [facility] EPIC. Please refer to them using EPIC/Care Everywhere.      DISCHARGE PLAN:    Follow up labs: No labs orders/due    Medical Follow Up:      Follow up with hospice    MTM referral needed and placed by this provider: No    Current [facility] scheduled appointments:       Discharge Services: Home Care:  Hospice          TOTAL DISCHARGE TIME:   Greater than 30 minutes  Electronically signed by:  MARGARITA Boucher CNP                 "

## 2020-11-11 NOTE — LETTER
11/11/2020        RE: Reanna Garza  [address] [facility] [address]        [place] [facility] DISCHARGE SUMMARY  PATIENT'S NAME: Reanna Garza  YOB: 1927  MEDICAL RECORD NUMBER:  [id number]  Place of Service where encounter took place:  [facility] ([facility]) [[id number]]    PRIMARY CARE PROVIDER AND CLINIC RESPONSIBLE AFTER TRANSFER:   [facility] [address] [facility] [facility] [address]    [facility] Provider     Transferring providers: MARGARTIA Boucher CNP; Solitario Webster MD  Recent Hospitalization/ED:  [facility] Hospital stay 10/1/20 to 10/04/20.  Date of SNF Admission: October / 04 / 2020  Date of SNF (anticipated) Discharge: November / 17 / 2020  Discharged to: with family HOME  Cognitive Scores/Physical Function/DME:    Sit to Stand: Mod x2   Standing: Mod-Max Ax2   Transfers: Mod-Max Ax2 (EZ-stand)   Bed Mobility: Mod-Max Ax2   UB Dressing: Mod A   LB Dressing: Max A (Ax2)   Toileting: Max A  6 steps in parallel bars    CODE STATUS/ADVANCE DIRECTIVES DISCUSSION:  Full Code   ALLERGIES: Codeine, Codeine sulfate, Penicillins, and Sulfa drugs    DISCHARGE DIAGNOSIS/NURSING FACILITY COURSE:     Patient Reanna Garza is 89 yr old female admitted to [facility] for rehabilitation s/p hospitalization ER visit 10/22/20 for generalized weakness with falls. Has urinary incontinence. Positive UC  Recent hospitalization for fall 10/1-10/4/20  Lives with daughter  GWENDOLYN positive on TCU 11/3/20 and asymptomatic except worsening decrease appetite    PMHx frequent fall, dementia, hypertension, hyperlipidemia, hx CVA with left sided weakness, non-insulin-dependent type 2 diabetes, DJD, abdominal aneurysm without rupture, RLS, macular degeneration and lumbar spondylolysis      Weakness, history of falls  History hip pain  No c/o pain, continue as needed Tylenol  Participation in therapies variable due  to cognition  wheelchair bound; decline from prior where patient used to ambulate. Decline present prior to COVID19  Patient needs to be fed and has had progressive weight loss. 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 Saw Neurologist 8/28 - pt with memory problems, worse with hyperglycemia. Stroke x5 yrs ago with left sided weakness. Memory problem progressed after stroke. Sundowning at home, more confused, legs give out at times, talking to people not there, and confused about family members. Hallucinations for the past 2-3 years. Verbally aggressive at times. Required 24 hour care at home,  Overall physical and mental decline; continue Aricept and Melatonin for dementia/sundowning    History CVA with left sided weakness  Continue aspirin, lower dose 81mg daily  HLD  Discontinue Lovastatin due to likely not benefit  Hypertension  blood pressure managed;   Losartan stopped due to hypotension and goals of care    COVID19 positive  No clinical signs and symptoms other then perhaps decreased appetite, however, had decreased appetite prior to COVID19  Complete 14 day course quarantine at discharge      type 2 diabetes, well controlled  Last Hgb A1c 5.4% 10/1/20  Monitor blood sugar,   Stop glipizide if blood sugar trend low; family would like to continue Glipizide at this time and risk hypoglycemia reviewed given patient decreased appetite    urinary tract infection  UC results >100, 000 Enterococcus faecalis  Completed course Macrobid 100 mg 2 x daily x 7 days and clinically resolved           Past Medical History:  has a past medical history of Abdominal aneurysm without mention of rupture, Arthritis, CVA (cerebral infarction), DJD (degenerative joint disease), Hyperlipidemia LDL goal <130, Hypertension goal BP  "(blood pressure) < 140/90, Low back pain, Right hip pain, Stroke (H), and Type 2 diabetes, HbA1C goal < 8% (H).    Discharge Medications:    Current Outpatient Medications   Medication Sig Dispense Refill     acetaminophen (TYLENOL) 325 MG tablet Take 650 mg by mouth every 4 hours as needed (hip pain)       aspirin 81 MG EC tablet Take 81 mg by mouth daily        donepezil (ARICEPT) 10 MG tablet Take 10 mg by mouth every morning       GLIPIZIDE PO Take 2.5 mg by mouth 2 times daily (before meals)       melatonin 3 MG tablet Take 6 mg by mouth At Bedtime         Medication Changes/Rationale:         Controlled medications sent with patient:   not applicable/none     ROS:   4 point ROS including Respiratory, CV, GI and , other than that noted in the HPI,  is negative    Physical Exam:   Vitals: /57   Pulse 51   Temp 97.9  F (36.6  C)   Resp 18   Ht 1.626 m (5' 4\")   Wt 67.4 kg (148 lb 11.2 oz)   SpO2 96%   BMI 25.52 kg/m    BMI= Body mass index is 25.52 kg/m .  GENERAL APPEARANCE:  Alert, in no distress     SNF labs: Labs done in SNF are in [facility] EPIC. Please refer to them using EPIC/Care Everywhere.      DISCHARGE PLAN:    Follow up labs: No labs orders/due    Medical Follow Up:      Follow up with hospice    MTM referral needed and placed by this provider: No    Current [facility] scheduled appointments:       Discharge Services: Home Care:  Hospice          TOTAL DISCHARGE TIME:   Greater than 30 minutes  Electronically signed by:  MARGARITA Boucher CNP                       Sincerely,        MARGARITA Boucher CNP      "

## 2020-11-13 NOTE — PROGRESS NOTES
"[place] [facility]    [place] Medical Record Number:  [id number]  Place of Service where encounter took place:  [facility] ([facility]) [[id number]]  Chief Complaint   Patient presents with     RECHECK       HPI:    Reanna Garza  is a 89 year old (12/25/1927), who is being seen today for an episodic care visit.  HPI information obtained from: facility chart records, facility staff, patient report and [facility] chart review.     Today's concern is:    Charge RN reported concern regarding recent code status. States patient is full code; however, noted discharge plan includes signing onto hospice services. Reports discharging home with family support on 11/18. Also noted to have variable intake with weight loss and variable BG 80-200s.     During exam, patient seen sitting in wheelchair. HPI limited due to dementia. Patient is pleasantly confused. Reports doing well. Denies pain, nausea, abdominal pain, coughing, SOB, chest pain, syncope, headache.         Past Medical and Surgical History reviewed in Epic today.    MEDICATIONS:    Current Outpatient Medications   Medication Sig Dispense Refill     acetaminophen (TYLENOL) 325 MG tablet Take 650 mg by mouth every 4 hours as needed (hip pain)       aspirin 81 MG EC tablet Take 81 mg by mouth daily        donepezil (ARICEPT) 10 MG tablet Take 10 mg by mouth every morning       GLIPIZIDE PO Take 2.5 mg by mouth 2 times daily (before meals)       melatonin 3 MG tablet Take 6 mg by mouth At Bedtime         REVIEW OF SYSTEMS:  Limited secondary to cognitive impairment but today pt reports no concerns      Objective:  BP 95/58   Pulse (!) 46   Temp 99.1  F (37.3  C)   Resp 20   Ht 1.626 m (5' 4\")   Wt 67.4 kg (148 lb 11.2 oz)   SpO2 95%   BMI 25.52 kg/m    Exam:  Limited observational exam due to COVID19 precautions  GENERAL APPEARANCE:  Alert, in no distress, appears healthy, oriented, cooperative  ENT:  Mouth and posterior oropharynx " normal, moist mucous membranes, [place]  EYES:  EOM, conjunctivae, lids, pupils and irises normal, PERRL  NECK:  No adenopathy,masses or thyromegaly  RESP:  no respiratory distress, no coughing  CV:  no edema  M/S:   Gait and station abnormal, nonambulatory. Resting in wheelchair. Digits and nails normal  SKIN:  Inspection of skin and subcutaneous tissue baseline  NEURO:   Cranial nerves 2-12 are normal tested and grossly at patient's baseline  PSYCH:  Oriented to self, memory impaired     Wt Readings from Last 4 Encounters:   11/13/20 67.4 kg (148 lb 11.2 oz)   11/11/20 67.4 kg (148 lb 11.2 oz)   11/06/20 68.2 kg (150 lb 6.4 oz)   11/04/20 68 kg (150 lb)       Labs:   Labs done in SNF [facility] in [facility]. Please refer to them using EPIC/Care Everywhere., Recent labs in EPIC reviewed by me today.  and   Most Recent 3 CBC's:  Recent Labs   Lab Test 10/26/20  0742 10/22/20  1302 10/07/20  0544   WBC 7.2 6.2 5.9   HGB 10.3* 11.0* 10.9*   MCV 88 86 89    261 234     Most Recent 3 BMP's:  Recent Labs   Lab Test 10/26/20  0742 10/22/20  1302 10/07/20  0544    139 143   POTASSIUM 3.5 3.9 3.2*   CHLORIDE 103 104 109   CO2 30 28 30   BUN 29 15 32*   CR 0.72 0.72 0.79   ANIONGAP 4 7 4   YURI 8.6 8.8 9.1   GLC 81 84 87       Lab Results   Component Value Date    A1C 5.4 10/01/2020    A1C 5.3 05/03/2019    A1C 5.9 02/22/2019    A1C 5.7 11/06/2018    A1C 5.7 03/29/2018           ASSESSMENT/PLAN:    (U07.1) Infection due to 2019 novel coronavirus  (primary encounter diagnosis)  (F01.50) Vascular dementia without behavioral disturbance (H)  (R53.81) Physical deconditioning  Comment: COVID+ on 11/3 with mild symptoms, noted to have decreased appetite and weight loss. Ongoing physical deconditioning, nonambulatory. Requires moderate-max assistance with ADLs.   Plan:   - RN manager called family to review code status, reports family does not want code status changed at this time. Wishes are to continue full code status while  patient is at TCU; states goal is to have hospice services at home.   - Discussed with RN manager options to discharge home sooner than 11/18 to accommodate family wishes, will follow-up with SW  - Encourage active participation in therapy session to increase strength and promote independence in activities and ADLs.   - SW following for discharge planning.     (E11.9) Type 2 diabetes mellitus without complication, without long-term current use of insulin (H)  Comment: Last A1C 5.4% on 10/1. Noted to have variable intake with variable BG.   Plan:   - Add to glipizide: hold if BG < 100 or if patient is not eating  - Monitor BG BID          Electronically signed by:  MARGARITA Funez CNP

## 2020-12-08 ENCOUNTER — TELEPHONE (OUTPATIENT)
Dept: FAMILY MEDICINE | Facility: CLINIC | Age: 85
End: 2020-12-08

## 2020-12-08 NOTE — TELEPHONE ENCOUNTER
General Call:     Who is calling:  Caroline from [facility]    Reason for Call:  Reanna passed away on 12/5 and Caroline needs a Dr to sign the death certificate before the cremation today 12/8.    Okay to leave a detailed message:Yes at Other phone number:  [phone]*

## 2020-12-16 DIAGNOSIS — E11.9 TYPE 2 DIABETES MELLITUS WITHOUT COMPLICATION, WITHOUT LONG-TERM CURRENT USE OF INSULIN (H): ICD-10-CM

## 2020-12-16 RX ORDER — GLIPIZIDE 5 MG/1
TABLET ORAL
Qty: 180 TABLET | Refills: 1 | OUTPATIENT
Start: 2020-12-16

## 2020-12-17 ENCOUNTER — TELEPHONE (OUTPATIENT)
Dept: FAMILY MEDICINE | Facility: CLINIC | Age: 85
End: 2020-12-17

## 2023-11-20 NOTE — TELEPHONE ENCOUNTER
ANUP TRUJILLO approved the discharge for RT, OT and HHA. She did not met her 1 goal of entering leaving her house safely. The said she would be able to do it safely with a ramp and family cannot afford a ramp at this time. Family will call if the do get a ramp so she can be trained how to use it.    Lenora Solitario RN     Patients daughter Tamar called in with an  and updated on patients cond